# Patient Record
Sex: MALE | Race: OTHER | HISPANIC OR LATINO
[De-identification: names, ages, dates, MRNs, and addresses within clinical notes are randomized per-mention and may not be internally consistent; named-entity substitution may affect disease eponyms.]

---

## 2017-06-19 ENCOUNTER — TRANSCRIPTION ENCOUNTER (OUTPATIENT)
Age: 67
End: 2017-06-19

## 2017-11-08 ENCOUNTER — TRANSCRIPTION ENCOUNTER (OUTPATIENT)
Age: 67
End: 2017-11-08

## 2017-11-25 ENCOUNTER — TRANSCRIPTION ENCOUNTER (OUTPATIENT)
Age: 67
End: 2017-11-25

## 2018-01-14 ENCOUNTER — TRANSCRIPTION ENCOUNTER (OUTPATIENT)
Age: 68
End: 2018-01-14

## 2019-03-01 ENCOUNTER — TRANSCRIPTION ENCOUNTER (OUTPATIENT)
Age: 69
End: 2019-03-01

## 2019-03-26 ENCOUNTER — TRANSCRIPTION ENCOUNTER (OUTPATIENT)
Age: 69
End: 2019-03-26

## 2019-06-12 ENCOUNTER — TRANSCRIPTION ENCOUNTER (OUTPATIENT)
Age: 69
End: 2019-06-12

## 2019-09-02 ENCOUNTER — INPATIENT (INPATIENT)
Facility: HOSPITAL | Age: 69
LOS: 1 days | Discharge: HOME | DRG: 683 | End: 2019-09-04
Attending: FAMILY MEDICINE | Admitting: INTERNAL MEDICINE
Payer: MEDICARE

## 2019-09-02 VITALS
SYSTOLIC BLOOD PRESSURE: 99 MMHG | HEART RATE: 92 BPM | OXYGEN SATURATION: 100 % | TEMPERATURE: 98 F | HEIGHT: 60 IN | RESPIRATION RATE: 16 BRPM | WEIGHT: 121.92 LBS | DIASTOLIC BLOOD PRESSURE: 60 MMHG

## 2019-09-02 DIAGNOSIS — F05 DELIRIUM DUE TO KNOWN PHYSIOLOGICAL CONDITION: ICD-10-CM

## 2019-09-02 DIAGNOSIS — R41.82 ALTERED MENTAL STATUS, UNSPECIFIED: ICD-10-CM

## 2019-09-02 LAB
ALBUMIN SERPL ELPH-MCNC: 4.1 G/DL — SIGNIFICANT CHANGE UP (ref 3.3–5)
ALP SERPL-CCNC: 45 U/L — SIGNIFICANT CHANGE UP (ref 40–120)
ALT FLD-CCNC: 49 U/L — SIGNIFICANT CHANGE UP (ref 12–78)
ANION GAP SERPL CALC-SCNC: 13 MMOL/L — SIGNIFICANT CHANGE UP (ref 5–17)
APAP SERPL-MCNC: <2 UG/ML — LOW (ref 10–30)
APPEARANCE UR: CLEAR — SIGNIFICANT CHANGE UP
APPEARANCE UR: CLEAR — SIGNIFICANT CHANGE UP
APTT BLD: 29.1 SEC — SIGNIFICANT CHANGE UP (ref 27.5–36.3)
AST SERPL-CCNC: 148 U/L — HIGH (ref 15–37)
BASOPHILS # BLD AUTO: 0.05 K/UL — SIGNIFICANT CHANGE UP (ref 0–0.2)
BASOPHILS NFR BLD AUTO: 0.5 % — SIGNIFICANT CHANGE UP (ref 0–2)
BILIRUB SERPL-MCNC: 1 MG/DL — SIGNIFICANT CHANGE UP (ref 0.2–1.2)
BILIRUB UR-MCNC: NEGATIVE — SIGNIFICANT CHANGE UP
BILIRUB UR-MCNC: NEGATIVE — SIGNIFICANT CHANGE UP
BUN SERPL-MCNC: 39 MG/DL — HIGH (ref 7–23)
CALCIUM SERPL-MCNC: 8.9 MG/DL — SIGNIFICANT CHANGE UP (ref 8.5–10.1)
CHLORIDE SERPL-SCNC: 103 MMOL/L — SIGNIFICANT CHANGE UP (ref 96–108)
CO2 SERPL-SCNC: 28 MMOL/L — SIGNIFICANT CHANGE UP (ref 22–31)
COLOR SPEC: YELLOW — SIGNIFICANT CHANGE UP
COLOR SPEC: YELLOW — SIGNIFICANT CHANGE UP
CREAT SERPL-MCNC: 2 MG/DL — HIGH (ref 0.5–1.3)
DIFF PNL FLD: ABNORMAL
DIFF PNL FLD: ABNORMAL
EOSINOPHIL # BLD AUTO: 0.16 K/UL — SIGNIFICANT CHANGE UP (ref 0–0.5)
EOSINOPHIL NFR BLD AUTO: 1.7 % — SIGNIFICANT CHANGE UP (ref 0–6)
ETHANOL SERPL-MCNC: <10 MG/DL — SIGNIFICANT CHANGE UP (ref 0–10)
GLUCOSE SERPL-MCNC: 147 MG/DL — HIGH (ref 70–99)
GLUCOSE UR QL: NEGATIVE MG/DL — SIGNIFICANT CHANGE UP
GLUCOSE UR QL: NEGATIVE MG/DL — SIGNIFICANT CHANGE UP
HCT VFR BLD CALC: 38.6 % — LOW (ref 39–50)
HGB BLD-MCNC: 12.9 G/DL — LOW (ref 13–17)
IMM GRANULOCYTES NFR BLD AUTO: 0.3 % — SIGNIFICANT CHANGE UP (ref 0–1.5)
INR BLD: 1.24 RATIO — HIGH (ref 0.88–1.16)
KETONES UR-MCNC: ABNORMAL
KETONES UR-MCNC: ABNORMAL
LEUKOCYTE ESTERASE UR-ACNC: NEGATIVE — SIGNIFICANT CHANGE UP
LEUKOCYTE ESTERASE UR-ACNC: NEGATIVE — SIGNIFICANT CHANGE UP
LYMPHOCYTES # BLD AUTO: 1.11 K/UL — SIGNIFICANT CHANGE UP (ref 1–3.3)
LYMPHOCYTES # BLD AUTO: 11.8 % — LOW (ref 13–44)
MCHC RBC-ENTMCNC: 29.3 PG — SIGNIFICANT CHANGE UP (ref 27–34)
MCHC RBC-ENTMCNC: 33.4 GM/DL — SIGNIFICANT CHANGE UP (ref 32–36)
MCV RBC AUTO: 87.5 FL — SIGNIFICANT CHANGE UP (ref 80–100)
MONOCYTES # BLD AUTO: 0.93 K/UL — HIGH (ref 0–0.9)
MONOCYTES NFR BLD AUTO: 9.9 % — SIGNIFICANT CHANGE UP (ref 2–14)
NEUTROPHILS # BLD AUTO: 7.12 K/UL — SIGNIFICANT CHANGE UP (ref 1.8–7.4)
NEUTROPHILS NFR BLD AUTO: 75.8 % — SIGNIFICANT CHANGE UP (ref 43–77)
NITRITE UR-MCNC: NEGATIVE — SIGNIFICANT CHANGE UP
NITRITE UR-MCNC: NEGATIVE — SIGNIFICANT CHANGE UP
PCP SPEC-MCNC: SIGNIFICANT CHANGE UP
PH UR: 5 — SIGNIFICANT CHANGE UP (ref 5–8)
PH UR: 6 — SIGNIFICANT CHANGE UP (ref 5–8)
PLATELET # BLD AUTO: 244 K/UL — SIGNIFICANT CHANGE UP (ref 150–400)
POTASSIUM SERPL-MCNC: 3.6 MMOL/L — SIGNIFICANT CHANGE UP (ref 3.5–5.3)
POTASSIUM SERPL-SCNC: 3.6 MMOL/L — SIGNIFICANT CHANGE UP (ref 3.5–5.3)
PROT SERPL-MCNC: 7.3 GM/DL — SIGNIFICANT CHANGE UP (ref 6–8.3)
PROT UR-MCNC: 15 MG/DL
PROT UR-MCNC: NEGATIVE MG/DL — SIGNIFICANT CHANGE UP
PROTHROM AB SERPL-ACNC: 13.8 SEC — HIGH (ref 10–12.9)
RBC # BLD: 4.41 M/UL — SIGNIFICANT CHANGE UP (ref 4.2–5.8)
RBC # FLD: 13.3 % — SIGNIFICANT CHANGE UP (ref 10.3–14.5)
SALICYLATES SERPL-MCNC: 2.1 MG/DL — LOW (ref 2.8–20)
SODIUM SERPL-SCNC: 144 MMOL/L — SIGNIFICANT CHANGE UP (ref 135–145)
SP GR SPEC: 1.01 — SIGNIFICANT CHANGE UP (ref 1.01–1.02)
SP GR SPEC: 1.02 — SIGNIFICANT CHANGE UP (ref 1.01–1.02)
TSH SERPL-MCNC: 1.06 UU/ML — SIGNIFICANT CHANGE UP (ref 0.34–4.82)
UROBILINOGEN FLD QL: 1 MG/DL
UROBILINOGEN FLD QL: 1 MG/DL
WBC # BLD: 9.4 K/UL — SIGNIFICANT CHANGE UP (ref 3.8–10.5)
WBC # FLD AUTO: 9.4 K/UL — SIGNIFICANT CHANGE UP (ref 3.8–10.5)

## 2019-09-02 PROCEDURE — 82570 ASSAY OF URINE CREATININE: CPT

## 2019-09-02 PROCEDURE — 84300 ASSAY OF URINE SODIUM: CPT

## 2019-09-02 PROCEDURE — 82607 VITAMIN B-12: CPT

## 2019-09-02 PROCEDURE — 76700 US EXAM ABDOM COMPLETE: CPT

## 2019-09-02 PROCEDURE — 70450 CT HEAD/BRAIN W/O DYE: CPT | Mod: 26

## 2019-09-02 PROCEDURE — 86780 TREPONEMA PALLIDUM: CPT

## 2019-09-02 PROCEDURE — 80307 DRUG TEST PRSMV CHEM ANLYZR: CPT

## 2019-09-02 PROCEDURE — 82746 ASSAY OF FOLIC ACID SERUM: CPT

## 2019-09-02 PROCEDURE — 71045 X-RAY EXAM CHEST 1 VIEW: CPT | Mod: 26

## 2019-09-02 PROCEDURE — 36415 COLL VENOUS BLD VENIPUNCTURE: CPT

## 2019-09-02 PROCEDURE — 80076 HEPATIC FUNCTION PANEL: CPT

## 2019-09-02 PROCEDURE — 80048 BASIC METABOLIC PNL TOTAL CA: CPT

## 2019-09-02 PROCEDURE — 86803 HEPATITIS C AB TEST: CPT

## 2019-09-02 PROCEDURE — G0008: CPT

## 2019-09-02 PROCEDURE — 84443 ASSAY THYROID STIM HORMONE: CPT

## 2019-09-02 PROCEDURE — 87086 URINE CULTURE/COLONY COUNT: CPT

## 2019-09-02 PROCEDURE — 93005 ELECTROCARDIOGRAM TRACING: CPT

## 2019-09-02 PROCEDURE — 81001 URINALYSIS AUTO W/SCOPE: CPT

## 2019-09-02 PROCEDURE — 82140 ASSAY OF AMMONIA: CPT

## 2019-09-02 PROCEDURE — 93010 ELECTROCARDIOGRAM REPORT: CPT

## 2019-09-02 PROCEDURE — 80053 COMPREHEN METABOLIC PANEL: CPT

## 2019-09-02 PROCEDURE — 90686 IIV4 VACC NO PRSV 0.5 ML IM: CPT

## 2019-09-02 RX ORDER — DOCUSATE SODIUM 100 MG
100 CAPSULE ORAL THREE TIMES A DAY
Refills: 0 | Status: DISCONTINUED | OUTPATIENT
Start: 2019-09-02 | End: 2019-09-04

## 2019-09-02 RX ORDER — SODIUM CHLORIDE 9 MG/ML
1000 INJECTION INTRAMUSCULAR; INTRAVENOUS; SUBCUTANEOUS
Refills: 0 | Status: DISCONTINUED | OUTPATIENT
Start: 2019-09-02 | End: 2019-09-04

## 2019-09-02 RX ORDER — LACTULOSE 10 G/15ML
10 SOLUTION ORAL ONCE
Refills: 0 | Status: COMPLETED | OUTPATIENT
Start: 2019-09-02 | End: 2019-09-02

## 2019-09-02 RX ORDER — QUETIAPINE FUMARATE 200 MG/1
25 TABLET, FILM COATED ORAL AT BEDTIME
Refills: 0 | Status: DISCONTINUED | OUTPATIENT
Start: 2019-09-02 | End: 2019-09-04

## 2019-09-02 RX ORDER — SENNA PLUS 8.6 MG/1
2 TABLET ORAL AT BEDTIME
Refills: 0 | Status: DISCONTINUED | OUTPATIENT
Start: 2019-09-02 | End: 2019-09-04

## 2019-09-02 RX ORDER — ONDANSETRON 8 MG/1
4 TABLET, FILM COATED ORAL EVERY 6 HOURS
Refills: 0 | Status: DISCONTINUED | OUTPATIENT
Start: 2019-09-02 | End: 2019-09-04

## 2019-09-02 RX ORDER — SODIUM CHLORIDE 9 MG/ML
500 INJECTION INTRAMUSCULAR; INTRAVENOUS; SUBCUTANEOUS ONCE
Refills: 0 | Status: COMPLETED | OUTPATIENT
Start: 2019-09-02 | End: 2019-09-02

## 2019-09-02 RX ORDER — INFLUENZA VIRUS VACCINE 15; 15; 15; 15 UG/.5ML; UG/.5ML; UG/.5ML; UG/.5ML
0.5 SUSPENSION INTRAMUSCULAR ONCE
Refills: 0 | Status: COMPLETED | OUTPATIENT
Start: 2019-09-02 | End: 2019-09-04

## 2019-09-02 RX ORDER — ACETAMINOPHEN 500 MG
650 TABLET ORAL EVERY 6 HOURS
Refills: 0 | Status: DISCONTINUED | OUTPATIENT
Start: 2019-09-02 | End: 2019-09-04

## 2019-09-02 RX ADMIN — SODIUM CHLORIDE 500 MILLILITER(S): 9 INJECTION INTRAMUSCULAR; INTRAVENOUS; SUBCUTANEOUS at 14:04

## 2019-09-02 RX ADMIN — LACTULOSE 10 GRAM(S): 10 SOLUTION ORAL at 21:04

## 2019-09-02 RX ADMIN — Medication 100 MILLIGRAM(S): at 21:05

## 2019-09-02 RX ADMIN — QUETIAPINE FUMARATE 25 MILLIGRAM(S): 200 TABLET, FILM COATED ORAL at 21:05

## 2019-09-02 RX ADMIN — SODIUM CHLORIDE 1000 MILLILITER(S): 9 INJECTION INTRAMUSCULAR; INTRAVENOUS; SUBCUTANEOUS at 13:00

## 2019-09-02 NOTE — ED BEHAVIORAL HEALTH ASSESSMENT NOTE - HPI (INCLUDE ILLNESS QUALITY, SEVERITY, DURATION, TIMING, CONTEXT, MODIFYING FACTORS, ASSOCIATED SIGNS AND SYMPTOMS)
69 year-old male, with history of one admission ~1985 for AMS, with no psychiatric treatment, presents brought in by for AMS x 72 hours.    Patient presents alert and oriented to self only, illogical, impaired reasoning, disorganized, looseness of association, poor retention and recall, poor concentration, rambling. Patient is not agitated or aggressive. When asked date, reports it being "1900's and 2020." When asked about place, reports "I am the missing link; I am retired." [place] Denies suicidal ideation. Denies homicidal ideation. Denies paranoia. Patient appears to have good impulse control in ED.     Son reports patient lives alone with ingrid, who (last mentioned) saw patient started showing alteration in mentation ~72 hours; not caring for self as usual; not eating; walking around aimlessly. Reports otherwise at baseline patient is polite, calm, cares for self, independent, however at this time is severely confused. Reports concern for safety especially given wandering behaviors.

## 2019-09-02 NOTE — ED BEHAVIORAL HEALTH ASSESSMENT NOTE - NS ED BHA REVIEW OF ED CHART VITAL SIGNS REVIEWED
Called with help of language line  ID #[id number].   No answer, message left with results information by     ~ Vinny SARAVIA CMA ([place])  
Yes

## 2019-09-02 NOTE — ED PROVIDER NOTE - CARE PLAN
Principal Discharge DX:	Altered mental status, unspecified altered mental status type Principal Discharge DX:	Altered mental status, unspecified altered mental status type  Secondary Diagnosis:	Renal insufficiency

## 2019-09-02 NOTE — ED ADULT NURSE NOTE - OBJECTIVE STATEMENT
BIB son with c/o "bizarre behavior", pt lives alone and landlord called son concerned about pt. Pt denies SI/HI

## 2019-09-02 NOTE — H&P ADULT - ASSESSMENT
70 yo M with pmh "thyroid disorder"  h/o remote PUD, h/o ETOH abuse > 10 years sober, h/o polysubstance abuse sober also sober > 10 years per son, unemployed h/o unknown mental illness and hospitalized in  and treated with haldol and other medications (stopped haldol due to tardive dyskinesia) now off all medications for > 30 years presenting with 3 days of insomnia, altered mentation, pressured speech and hallucinations. Per son Morgan Roche, states his father has been wandering on [place] at very late hours of the night stating that he is with his mother and father both who are . Son also endorses that he dad is speaking incoherently, very pressured, not making sense and verbose which is not typical behavior for him. His father has been unemployed since the  when he was found to have a " mental illness," however he is not sure what the diagnosis was and he is does not follow up with a therapist. No new stressors. No recent illness.       A:  Acute psychosis r/o organic causes vs primary manic episode  Acute renal failure unknown baseline)  Transaminitis with mild elevation in ammonia level      P:  Admit to [facility]  CTH negative  Cehck RPR, TSH, B12, folate  Utox negative, ETOH negative  U/S of RUQ. lactulose. hep panel  U/S kidney.bladder, urine studies  Psych follow up if all studies do not point towards metabolic or infectious etiology    IMPROVE VTE Individual Risk Assessment    RISK                                                                Points    [  ] Previous VTE                                                  3    [  ] Thrombophilia                                               2    [  ] Lower limb paralysis                                      2        (unable to hold up >15 seconds)      [  ] Current Cancer                                              2         (within 6 months)    [  ] Immobilization > 24 hrs                                1    [  ] ICU/CCU stay > 24 hours                              1    [x  ] Age > 60                                                      1    IMPROVE VTE Score ___1______-> SCD/ambualate    D/W son Morgan [phone]  Full code    IMPROVE Score 0-1: Low Risk, No VTE prophylaxis required for most patients, encourage ambulation.   IMPROVE Score 2-3: At risk, pharmacologic VTE prophylaxis is indicated for most patients (in the absence of a contraindication)  IMPROVE Score > or = 4: High Risk, pharmacologic VTE prophylaxis is indicated for most patients (in the absence of a contraindication)

## 2019-09-02 NOTE — ED PROVIDER NOTE - OBJECTIVE STATEMENT
68 y/o male with no pertinent PMHx presents to the ED BIB son regarding erratic behavior/ Per son at bedside pt has not slept for the past 3 days and has been talking to hiimself and expressing erratic behavior. Pt was admitted to hospital once before in 1984 for erratic behavior and was previously on medications that he hasn't taken for 35 years. Pt's son denies pt being suicidal or homicidal. Son- Morgan Maurernte: [phone].

## 2019-09-02 NOTE — ED ADULT TRIAGE NOTE - CHIEF COMPLAINT QUOTE
pt brought by EMS home brought by son for eval of irregular behavior increasing over past 3 days. pt denies crawford, ETOH, drug use. pt noted to be restless in triage.

## 2019-09-02 NOTE — ED BEHAVIORAL HEALTH ASSESSMENT NOTE - SUMMARY
69 year-old male, with history of one admission ~1985 for AMS, with no psychiatric treatment, presents brought in by for AMS x 72 hours.    Patient presents with acute change in mentation, indicative of delirium with unknown mentation. It is unlikely patient is having psychotic episode given stability for > 35 years with no medication management. Patient has no prior / current suicidal ideation/intent/plan. Patient has family support. Patient being admitted to medicine and psychiatry to follow: medical treatment (renal insufficiency) and sleep is expected to improve symptoms.    PLAN:  #Seroquel 25 mg at bedtime.

## 2019-09-02 NOTE — ED BEHAVIORAL HEALTH ASSESSMENT NOTE - PSYCHIATRIC ISSUES AND PLAN (INCLUDE STANDING AND PRN MEDICATION)
Seroquel 25 mg at bedtime: plan to titrate to ~ 50 - 100 mg over time. Haldol 1 mg Q6H as needed for agitation

## 2019-09-02 NOTE — ED BEHAVIORAL HEALTH ASSESSMENT NOTE - DESCRIPTION
Patient was calm and cooperative in the ED and did not exhibit any aggression. Patient did not require any PRN medications or any physical restraints.    Vital Signs Last 24 Hrs  T(C): 36.6 (02 Sep 2019 12:56), Max: 36.8 (02 Sep 2019 12:14)  T(F): 97.9 (02 Sep 2019 12:56), Max: 98.3 (02 Sep 2019 12:14)  HR: 76 (02 Sep 2019 12:56) (75 - 92)  BP: 143/76 (02 Sep 2019 12:56) (99/60 - 143/76)  BP(mean): --  RR: 16 (02 Sep 2019 12:56) (16 - 17)  SpO2: 100% (02 Sep 2019 12:56) (100% - 100%) None. As per HPI

## 2019-09-02 NOTE — ED ADULT NURSE NOTE - NSIMPLEMENTINTERV_GEN_ALL_ED
Implemented All Universal Safety Interventions:  [place] to call system. Call bell, personal items and telephone within reach. Instruct patient to call for assistance. Room bathroom lighting operational. Non-slip footwear when patient is off stretcher. Physically safe environment: no spills, clutter or unnecessary equipment. Stretcher in lowest position, wheels locked, appropriate side rails in place.

## 2019-09-02 NOTE — ED BEHAVIORAL HEALTH ASSESSMENT NOTE - RISK ASSESSMENT
LOW TO MODERATE RISK    ACUTE RISK FACTORS: AMS, illogical, disorganized    PROTECTIVE FACTORS: no prior / current suicidal ideation/intent/plan or suicide attempt; no depressed mood; supportive family; access to healthcare.

## 2019-09-02 NOTE — ED PROVIDER NOTE - CONSTITUTIONAL, MLM
normal... Well appearing, well nourished, alert, oriented to person, place, time/situation and in no apparent distress. +pt sleepy

## 2019-09-02 NOTE — ED PROVIDER NOTE - PROGRESS NOTE DETAILS
Attending Echevarria, Psych eval pt and believes more delirium then psychosis. Attending festus Echevarria/darwin Kendall for admission

## 2019-09-02 NOTE — H&P ADULT - NSHPPHYSICALEXAM_GEN_ALL_CORE
ICU Vital Signs Last 24 Hrs  T(C): 36.6 (02 Sep 2019 12:56), Max: 36.8 (02 Sep 2019 12:14)  T(F): 97.9 (02 Sep 2019 12:56), Max: 98.3 (02 Sep 2019 12:14)  HR: 66 (02 Sep 2019 15:30) (66 - 92)  BP: 143/76 (02 Sep 2019 15:30) (99/60 - 143/76)  BP(mean): --  ABP: --  ABP(mean): --  RR: 16 (02 Sep 2019 15:30) (16 - 17)  SpO2: 100% (02 Sep 2019 15:30) (100% - 100%)      PHYSICAL EXAM:    Constitutional: NAD, awake and alert  HEENT: PERR, EOMI, Normal Hearing, MMM  Neck: Soft and supple, No LAD, No JVD  Respiratory: Breath sounds are clear bilaterally, No wheezing, rales or rhonchi  Cardiovascular: S1 and S2, regular rate and rhythm, no Murmurs, gallops or rubs  Gastrointestinal: Bowel Sounds present, soft, nontender, nondistended, no guarding, no rebound  Extremities: No peripheral edema  Vascular: 2+ peripheral pulses  Neurological: A/O x 0, no focal deficits  Musculoskeletal: 5/5 strength b/l upper and lower extremities  Skin: No rashes ICU Vital Signs Last 24 Hrs  T(C): 36.6 (02 Sep 2019 12:56), Max: 36.8 (02 Sep 2019 12:14)  T(F): 97.9 (02 Sep 2019 12:56), Max: 98.3 (02 Sep 2019 12:14)  HR: 66 (02 Sep 2019 15:30) (66 - 92)  BP: 143/76 (02 Sep 2019 15:30) (99/60 - 143/76)  BP(mean): --  ABP: --  ABP(mean): --  RR: 16 (02 Sep 2019 15:30) (16 - 17)  SpO2: 100% (02 Sep 2019 15:30) (100% - 100%)      PHYSICAL EXAM:    Constitutional: NAD, awake and alert  HEENT: PERR, EOMI, Normal Hearing, MMM  Neck: Soft and supple, No LAD, No JVD  Respiratory: Breath sounds are clear bilaterally, No wheezing, rales or rhonchi  Cardiovascular: S1 and S2, regular rate and rhythm, no Murmurs, gallops or rubs  Gastrointestinal: Bowel Sounds present, soft, nontender, nondistended, no guarding, no rebound  Extremities: No peripheral edema  Vascular: 2+ peripheral pulses  Neurological: A/O x 0, no focal deficits; no nuchal rigidity  Musculoskeletal: 5/5 strength b/l upper and lower extremities  Skin: No rashes

## 2019-09-02 NOTE — H&P ADULT - HISTORY OF PRESENT ILLNESS
70 yo M with pmh "thyroid disorder"  h/o remote PUD, h/o ETOH abuse > 10 years sober, h/o polysubstance abuse sober also sober > 10 years per son, unemployed h/o unknown mental illness and hospitalized in  and treated with haldol and other medications (stopped haldol due to tardive dyskinesia) now off all medications for > 30 years presenting with 3 days of insomnia, altered mentation, pressured speech and hallucinations. Per son Morgan Roche, states his father has been wandering on [place] at very late hours of the night stating that he is with his mother and father both who are . Son also endorses that he dad is speaking incoherently, very pressured, not making sense and verbose which is not typical behavior for him. His father has been unemployed since the  when he was found to have a " mental illness," however he is not sure what the diagnosis was and he is does not follow up with a therapist. No new stressors. No recent illness.     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 Has also has seen Vicky De La Torre but family states has not seen her since her primary office closed    Son provided most of history      Social: Vapes, former etoh and crack abuser  Shx: spine surgery, PUD - laparotomy  Fhx: son states no family h/o bipolar disorder or schizophrenia in uncles/aunts however he is unsure about paternal grandparents 68 yo M with pmh "thyroid disorder"  h/o remote PUD, h/o ETOH abuse > 10 years sober, h/o polysubstance abuse sober also sober > 10 years per son, unemployed h/o unknown mental illness and hospitalized in  and treated with haldol and other medications (stopped haldol due to tardive dyskinesia) now off all medications for > 30 years presenting with 3 days of insomnia, altered mentation, pressured speech and hallucinations. Per son Morgan Roche, states his father has been wandering on [place] at very late hours of the night stating that he is with his mother and father both who are . Son also endorses that he dad is speaking incoherently, very pressured, not making sense and verbose which is not typical behavior for him. His father has been unemployed since the  when he was found to have a " mental illness," however he is not sure what the diagnosis was and he is does not follow up with a therapist. No new stressors. No recent illness. 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 Has also has seen Vicky De La Torre but family states has not seen her since her primary office closed    Son provided most of history      Social: Vapes, former etoh and crack abuser  Shx: spine surgery, PUD - laparotomy  Fhx: son states no family h/o bipolar disorder or schizophrenia in uncles/aunts however he is unsure about paternal grandparents

## 2019-09-02 NOTE — ED PROVIDER NOTE - CLINICAL SUMMARY MEDICAL DECISION MAKING FREE TEXT BOX
Patient called on status of prescription.      PSR relayed refill policy timeframe.     Pt with erratic behavior. Plan: labs, CT, probably psych consult.

## 2019-09-02 NOTE — H&P ADULT - NSHPLABSRESULTS_GEN_ALL_CORE
LABS: All Labs Reviewed:                        12.9   9.40  )-----------( 244      ( 02 Sep 2019 12:52 )             38.6     09-02    144  |  103  |  39<H>  ----------------------------<  147<H>  3.6   |  28  |  2.00<H>    Ca    8.9      02 Sep 2019 12:52    TPro  7.3  /  Alb  4.1  /  TBili  1.0  /  DBili  x   /  AST  148<H>  /  ALT  49  /  AlkPhos  45  09-02    PT/INR - ( 02 Sep 2019 12:52 )   PT: 13.8 sec;   INR: 1.24 ratio         PTT - ( 02 Sep 2019 12:52 )  PTT:29.1 sec          Blood Culture:

## 2019-09-03 LAB
ADD ON TEST-SPECIMEN IN LAB: SIGNIFICANT CHANGE UP
ALBUMIN SERPL ELPH-MCNC: 3.3 G/DL — SIGNIFICANT CHANGE UP (ref 3.3–5)
ALP SERPL-CCNC: 38 U/L — LOW (ref 40–120)
ALT FLD-CCNC: 44 U/L — SIGNIFICANT CHANGE UP (ref 12–78)
ANION GAP SERPL CALC-SCNC: 9 MMOL/L — SIGNIFICANT CHANGE UP (ref 5–17)
AST SERPL-CCNC: 109 U/L — HIGH (ref 15–37)
BILIRUB DIRECT SERPL-MCNC: 0.2 MG/DL — SIGNIFICANT CHANGE UP (ref 0–0.2)
BILIRUB INDIRECT FLD-MCNC: 0.5 MG/DL — SIGNIFICANT CHANGE UP (ref 0.2–1)
BILIRUB SERPL-MCNC: 0.7 MG/DL — SIGNIFICANT CHANGE UP (ref 0.2–1.2)
BUN SERPL-MCNC: 24 MG/DL — HIGH (ref 7–23)
CALCIUM SERPL-MCNC: 8.3 MG/DL — LOW (ref 8.5–10.1)
CHLORIDE SERPL-SCNC: 108 MMOL/L — SIGNIFICANT CHANGE UP (ref 96–108)
CO2 SERPL-SCNC: 30 MMOL/L — SIGNIFICANT CHANGE UP (ref 22–31)
CREAT ?TM UR-MCNC: 152 MG/DL — SIGNIFICANT CHANGE UP
CREAT SERPL-MCNC: 1.12 MG/DL — SIGNIFICANT CHANGE UP (ref 0.5–1.3)
CULTURE RESULTS: SIGNIFICANT CHANGE UP
FOLATE SERPL-MCNC: 6.1 NG/ML — SIGNIFICANT CHANGE UP
GLUCOSE SERPL-MCNC: 90 MG/DL — SIGNIFICANT CHANGE UP (ref 70–99)
HCV AB S/CO SERPL IA: 0.37 S/CO — SIGNIFICANT CHANGE UP (ref 0–0.99)
HCV AB SERPL-IMP: SIGNIFICANT CHANGE UP
POTASSIUM SERPL-MCNC: 3.9 MMOL/L — SIGNIFICANT CHANGE UP (ref 3.5–5.3)
POTASSIUM SERPL-SCNC: 3.9 MMOL/L — SIGNIFICANT CHANGE UP (ref 3.5–5.3)
PROT SERPL-MCNC: 6.1 GM/DL — SIGNIFICANT CHANGE UP (ref 6–8.3)
SODIUM SERPL-SCNC: 147 MMOL/L — HIGH (ref 135–145)
SODIUM UR-SCNC: 65 MMOL/L — SIGNIFICANT CHANGE UP
SPECIMEN SOURCE: SIGNIFICANT CHANGE UP
T PALLIDUM AB TITR SER: NEGATIVE — SIGNIFICANT CHANGE UP
VIT B12 SERPL-MCNC: 490 PG/ML — SIGNIFICANT CHANGE UP (ref 232–1245)

## 2019-09-03 PROCEDURE — 99232 SBSQ HOSP IP/OBS MODERATE 35: CPT

## 2019-09-03 PROCEDURE — 76700 US EXAM ABDOM COMPLETE: CPT | Mod: 26

## 2019-09-03 PROCEDURE — 93010 ELECTROCARDIOGRAM REPORT: CPT

## 2019-09-03 RX ORDER — ENOXAPARIN SODIUM 100 MG/ML
40 INJECTION SUBCUTANEOUS DAILY
Refills: 0 | Status: DISCONTINUED | OUTPATIENT
Start: 2019-09-03 | End: 2019-09-04

## 2019-09-03 RX ADMIN — Medication 650 MILLIGRAM(S): at 09:33

## 2019-09-03 RX ADMIN — QUETIAPINE FUMARATE 25 MILLIGRAM(S): 200 TABLET, FILM COATED ORAL at 22:36

## 2019-09-03 RX ADMIN — Medication 100 MILLIGRAM(S): at 05:14

## 2019-09-03 RX ADMIN — ENOXAPARIN SODIUM 40 MILLIGRAM(S): 100 INJECTION SUBCUTANEOUS at 11:56

## 2019-09-03 RX ADMIN — Medication 650 MILLIGRAM(S): at 10:30

## 2019-09-03 NOTE — PROGRESS NOTE ADULT - SUBJECTIVE AND OBJECTIVE BOX
HPI: 70 yo M with pmh "thyroid disorder"  h/o remote PUD, h/o ETOH abuse > 10 years sober, h/o polysubstance abuse sober also sober > 10 years per son, unemployed h/o unknown mental illness and hospitalized in  and treated with haldol and other medications (stopped haldol due to tardive dyskinesia) now off all medications for > 30 years presenting with 3 days of insomnia, altered mentation, pressured speech and hallucinations. Per son Morgan Roche, states his father has been wandering on [place] at very late hours of the night stating that he is with his mother and father both who are . Son also endorses that he dad is speaking incoherently, very pressured, not making sense and verbose which is not typical behavior for him. His father has been unemployed since the  when he was found to have a " mental illness," however he is not sure what the diagnosis was and he is does not follow up with a therapist. No new stressors. No recent illness. 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 Has also has seen Vicky De La Torre but [facility] has not seen her since her primary office closed      9/3: no complaints  awake , alert  NH3 28  Cr normalized to 1.12  LFTs elevated      PHYSICAL EXAM:    Daily     Daily     ICU Vital Signs Last 24 Hrs  T(C): 36.7 (03 Sep 2019 10:58), Max: 36.9 (03 Sep 2019 05:16)  T(F): 98 (03 Sep 2019 10:58), Max: 98.4 (03 Sep 2019 05:16)  HR: 68 (03 Sep 2019 10:58) (55 - 86)  BP: 112/60 (03 Sep 2019 10:58) (112/60 - 139/63)  BP(mean): --  ABP: --  ABP(mean): --  RR: 17 (03 Sep 2019 10:58) (16 - 17)  SpO2: 100% (03 Sep 2019 10:58) (98% - 100%)      Constitutional: Well appearing  HEENT: Atraumatic, LEORA, Normal, No congestion  Respiratory: Breath Sounds normal, no rhonchi/wheeze  Cardiovascular: N S1S2;  Gastrointestinal: Abdomen soft, non tender, Bowel Sounds present  Extremities: No edema, peripheral pulses present  Neurological: AAO x 2,, no gross focal motor deficits  Skin: Non cellulitic, no rash, ulcers  Lymph Nodes: No lymphadenopathy noted  Back: No CVA tenderness   Musculoskeletal: non tender  Breasts: Deferred  Genitourinary: deferred  Rectal: Deferred                          12.9   9.40  )-----------( 244      ( 02 Sep 2019 12:52 )             38.6       CBC Full  -  ( 02 Sep 2019 12:52 )  WBC Count : 9.40 K/uL  RBC Count : 4.41 M/uL  Hemoglobin : 12.9 g/dL  Hematocrit : 38.6 %  Platelet Count - Automated : 244 K/uL  Mean Cell Volume : 87.5 fl  Mean Cell Hemoglobin : 29.3 pg  Mean Cell Hemoglobin Concentration : 33.4 gm/dL  Auto Neutrophil # : 7.12 K/uL  Auto Lymphocyte # : 1.11 K/uL  Auto Monocyte # : 0.93 K/uL  Auto Eosinophil # : 0.16 K/uL  Auto Basophil # : 0.05 K/uL  Auto Neutrophil % : 75.8 %  Auto Lymphocyte % : 11.8 %  Auto Monocyte % : 9.9 %  Auto Eosinophil % : 1.7 %  Auto Basophil % : 0.5 %          147<H>  |  108  |  24<H>  ----------------------------<  90  3.9   |  30  |  1.12    Ca    8.3<L>      03 Sep 2019 06:38    TPro  6.1  /  Alb  3.3  /  TBili  0.7  /  DBili  0.2  /  AST  109<H>  /  ALT  44  /  AlkPhos  38<L>        LIVER FUNCTIONS - ( 03 Sep 2019 06:38 )  Alb: 3.3 g/dL / Pro: 6.1 gm/dL / ALK PHOS: 38 U/L / ALT: 44 U/L / AST: 109 U/L / GGT: x             PT/INR - ( 02 Sep 2019 12:52 )   PT: 13.8 sec;   INR: 1.24 ratio         PTT - ( 02 Sep 2019 12:52 )  PTT:29.1 sec          Urinalysis Basic - ( 02 Sep 2019 21:00 )    Color: Yellow / Appearance: Clear / S.015 / pH: x  Gluc: x / Ketone: Trace  / Bili: Negative / Urobili: 1 mg/dL   Blood: x / Protein: Negative mg/dL / Nitrite: Negative   Leuk Esterase: Negative / RBC: 3-5 /HPF / WBC 3-5   Sq Epi: x / Non Sq Epi: Occasional / Bacteria: Occasional            MEDICATIONS  (STANDING):  docusate sodium 100 milliGRAM(s) Oral three times a day  enoxaparin Injectable 40 milliGRAM(s) SubCutaneous daily  influenza   Vaccine 0.5 milliLiter(s) IntraMuscular once  QUEtiapine 25 milliGRAM(s) Oral at bedtime  sodium chloride 0.9%. 1000 milliLiter(s) (50 mL/Hr) IV Continuous <Continuous>

## 2019-09-03 NOTE — PROGRESS NOTE ADULT - ASSESSMENT
70 yo M with pmh "thyroid disorder"  h/o remote PUD, h/o ETOH abuse > 10 years sober, h/o polysubstance abuse sober also sober > 10 years per son, unemployed h/o unknown mental illness and hospitalized in 1984 and treated with haldol and other medications (stopped haldol due to tardive dyskinesia) now off all medications for > 30 years presenting with 3 days of insomnia, altered mentation, pressured speech and hallucinations.    Pt admitted with     1) Acute psychosis r/o organic causes vs primary manic episode  better now,   ammonia normalized  ALYSSA normalized  US abdo shows cirrhosis  Psych f/u appreciated; pt would need Psych clearance prior to discharge    2) Acute renal failure: resolved with iv fluids  recheck in am    3) Transaminitis with mild elevation in ammonia level:   trending down; likely form dehydration + cirrhosis  check in am    4) DVT PPX: enoxaparin 68 yo M with pmh "thyroid disorder"  h/o remote PUD, h/o ETOH abuse > 10 years sober, h/o polysubstance abuse sober also sober > 10 years per son, unemployed h/o unknown mental illness and hospitalized in 1984 and treated with haldol and other medications (stopped haldol due to tardive dyskinesia) now off all medications for > 30 years presenting with 3 days of insomnia, altered mentation, pressured speech and hallucinations.    Pt admitted with     1) Acute psychosis r/o organic causes vs primary manic episode  better now,   ammonia normalized  ALYSSA normalized    Psych f/u appreciated; pt would need Psych clearance prior to discharge    2) Acute renal failure: resolved with iv fluids  recheck in am    3) Transaminitis with mild elevation in ammonia level:   trending down; likely form dehydration + cirrhosis  US abdo shows cirrhosis + ? hemangioma of liver  check in am    4) DVT PPX: enoxaparin

## 2019-09-04 ENCOUNTER — TRANSCRIPTION ENCOUNTER (OUTPATIENT)
Age: 69
End: 2019-09-04

## 2019-09-04 VITALS
RESPIRATION RATE: 16 BRPM | HEART RATE: 56 BPM | SYSTOLIC BLOOD PRESSURE: 103 MMHG | DIASTOLIC BLOOD PRESSURE: 61 MMHG | TEMPERATURE: 98 F | OXYGEN SATURATION: 100 %

## 2019-09-04 LAB
ALBUMIN SERPL ELPH-MCNC: 3.1 G/DL — LOW (ref 3.3–5)
ALP SERPL-CCNC: 36 U/L — LOW (ref 40–120)
ALT FLD-CCNC: 40 U/L — SIGNIFICANT CHANGE UP (ref 12–78)
ANION GAP SERPL CALC-SCNC: 4 MMOL/L — LOW (ref 5–17)
AST SERPL-CCNC: 68 U/L — HIGH (ref 15–37)
BILIRUB SERPL-MCNC: 0.6 MG/DL — SIGNIFICANT CHANGE UP (ref 0.2–1.2)
BUN SERPL-MCNC: 15 MG/DL — SIGNIFICANT CHANGE UP (ref 7–23)
CALCIUM SERPL-MCNC: 8.1 MG/DL — LOW (ref 8.5–10.1)
CHLORIDE SERPL-SCNC: 111 MMOL/L — HIGH (ref 96–108)
CO2 SERPL-SCNC: 30 MMOL/L — SIGNIFICANT CHANGE UP (ref 22–31)
CREAT SERPL-MCNC: 1.01 MG/DL — SIGNIFICANT CHANGE UP (ref 0.5–1.3)
GLUCOSE SERPL-MCNC: 87 MG/DL — SIGNIFICANT CHANGE UP (ref 70–99)
POTASSIUM SERPL-MCNC: 3.7 MMOL/L — SIGNIFICANT CHANGE UP (ref 3.5–5.3)
POTASSIUM SERPL-SCNC: 3.7 MMOL/L — SIGNIFICANT CHANGE UP (ref 3.5–5.3)
PROT SERPL-MCNC: 5.5 GM/DL — LOW (ref 6–8.3)
SODIUM SERPL-SCNC: 145 MMOL/L — SIGNIFICANT CHANGE UP (ref 135–145)

## 2019-09-04 PROCEDURE — 99232 SBSQ HOSP IP/OBS MODERATE 35: CPT

## 2019-09-04 RX ORDER — ACETAMINOPHEN 500 MG
2 TABLET ORAL
Qty: 0 | Refills: 0 | DISCHARGE
Start: 2019-09-04

## 2019-09-04 RX ORDER — DOCUSATE SODIUM 100 MG
1 CAPSULE ORAL
Qty: 0 | Refills: 0 | DISCHARGE
Start: 2019-09-04

## 2019-09-04 RX ADMIN — INFLUENZA VIRUS VACCINE 0.5 MILLILITER(S): 15; 15; 15; 15 SUSPENSION INTRAMUSCULAR at 12:23

## 2019-09-04 RX ADMIN — ENOXAPARIN SODIUM 40 MILLIGRAM(S): 100 INJECTION SUBCUTANEOUS at 11:13

## 2019-09-04 RX ADMIN — SODIUM CHLORIDE 50 MILLILITER(S): 9 INJECTION INTRAMUSCULAR; INTRAVENOUS; SUBCUTANEOUS at 00:16

## 2019-09-04 NOTE — PROGRESS NOTE BEHAVIORAL HEALTH - NSBHCHARTREVIEWLAB_PSY_A_CORE FT
12.9   9.40  )-----------( 244      ( 02 Sep 2019 12:52 )             38.6   09-03    147<H>  |  108  |  24<H>  ----------------------------<  90  3.9   |  30  |  1.12    Ca    8.3<L>      03 Sep 2019 06:38    TPro  6.1  /  Alb  3.3  /  TBili  0.7  /  DBili  0.2  /  AST  109<H>  /  ALT  44  /  AlkPhos  38<L>  09-03
12.9   9.40  )-----------( 244      ( 02 Sep 2019 12:52 )             38.6   09-04    145  |  111<H>  |  15  ----------------------------<  87  3.7   |  30  |  1.01    Ca    8.1<L>      04 Sep 2019 06:21    TPro  5.5<L>  /  Alb  3.1<L>  /  TBili  0.6  /  DBili  x   /  AST  68<H>  /  ALT  40  /  AlkPhos  36<L>  09-04

## 2019-09-04 NOTE — DISCHARGE NOTE PROVIDER - NSDCCPCAREPLAN_GEN_ALL_CORE_FT
PRINCIPAL DISCHARGE DIAGNOSIS  Diagnosis: Altered mental status, unspecified altered mental status type  Assessment and Plan of Treatment: -Resolved  -Follow up with PCP within 1 week of discharge      SECONDARY DISCHARGE DIAGNOSES  Diagnosis: Liver hemangioma  Assessment and Plan of Treatment: -Incidental finding on ultrasound  -Follow up with GI within 1 week of discharge    Diagnosis: Renal insufficiency  Assessment and Plan of Treatment: -Resolved  -Encourage oral fluid hydration

## 2019-09-04 NOTE — DISCHARGE NOTE PROVIDER - CARE PROVIDER_API CALL
Tyshawn Zaldivar)  Gastroenterology; Internal Medicine  [address], [address]  [place] [address]  Phone: [phone]  Fax: [phone]  Follow Up Time: 1 week

## 2019-09-04 NOTE — PROGRESS NOTE BEHAVIORAL HEALTH - SUMMARY
69 year-old male, with history of one admission for AMS, with no psychiatric treatment, presents brought in by for AMS x 72 hours.    Patient was admitted  with acute change in mental status, indicative of delirium.  Patient has no prior / current suicidal ideation/intent/plan. Patient has family support.   Pt si not at imminent risk to harm self and other and does not need inpatient psychiatry treatment.     PLAN:    D/C Seroquel 25 mg at bedtime . There is no behavioral problems and pt is not psychotic.
69 year-old male, with history of one admission ~1985 for AMS, with no psychiatric treatment, presents brought in by for AMS x 72 hours.    Patient was admitted  with acute change in mental status, indicative of delirium.  Patient has no prior / current suicidal ideation/intent/plan. Patient has family support.   Pt si not at imminent risk to harm self and other and does not need inpatient psychiatry treatment.     PLAN:  #Seroquel 25 mg at bedtime.

## 2019-09-04 NOTE — PROGRESS NOTE BEHAVIORAL HEALTH - NSBHCHARTREVIEWVS_PSY_A_CORE FT
Vital Signs Last 24 Hrs  T(C): 36.7 (03 Sep 2019 10:58), Max: 36.9 (03 Sep 2019 05:16)  T(F): 98 (03 Sep 2019 10:58), Max: 98.4 (03 Sep 2019 05:16)  HR: 68 (03 Sep 2019 10:58) (55 - 86)  BP: 112/60 (03 Sep 2019 10:58) (112/60 - 143/76)  BP(mean): --  RR: 17 (03 Sep 2019 10:58) (16 - 17)  SpO2: 100% (03 Sep 2019 10:58) (98% - 100%)
Vital Signs Last 24 Hrs  T(C): 36.9 (04 Sep 2019 11:14), Max: 37.1 (03 Sep 2019 20:07)  T(F): 98.4 (04 Sep 2019 11:14), Max: 98.7 (03 Sep 2019 20:07)  HR: 56 (04 Sep 2019 11:14) (52 - 57)  BP: 103/61 (04 Sep 2019 11:14) (96/57 - 114/67)  BP(mean): --  RR: 16 (04 Sep 2019 11:14) (16 - 18)  SpO2: 100% (04 Sep 2019 11:14) (99% - 100%)

## 2019-09-04 NOTE — PROGRESS NOTE BEHAVIORAL HEALTH - NSBHCHARTREVIEWIMAGING_PSY_A_CORE FT
CT head:  IMPRESSION:   Mild periventricular white matter ischemia.
CT head:  IMPRESSION:   Mild periventricular white matter ischemia.

## 2019-09-04 NOTE — PROGRESS NOTE BEHAVIORAL HEALTH - NSBHFUPINTERVALHXFT_PSY_A_CORE
Pt is dressed in [facility], [facility]. He denies feeling depressed or anxious. Denies use of substances, denies any psych hx. Not taking psychotropics. he resides alone, but has children and ex wife in community.   He is retired.   He denies any type of SI, HI, Ah, VH, Pi at this time.  Sleep and appetite are satisfying.
Pt is spontaneous, talkative, communicative, calm interfraction. He denies feeling depressed or anxious. Denies use of substances, denies any psych hx. Not taking psychotropics. he resides alone, but has children and ex wife in community.   He is retired.   He denies any type of SI, HI, Ah, VH, Pi at this time.  Sleep and appetite are satisfying.

## 2019-09-04 NOTE — DISCHARGE NOTE NURSING/CASE MANAGEMENT/SOCIAL WORK - NSDCVIVACCINE_GEN_ALL_CORE_FT
Problem: At Risk for Falls  Goal: # Patient does not fall  Outcome: Outcome Met, Continue evaluating goal progress toward completion  Pt did not fall during my care. Pt using call light appropriately, wearing  socks, using gait belt and walker.     Problem: VTE, Risk for  Goal: # No s/s of VTE  Outcome: Outcome Met, Continue evaluating goal progress toward completion  No s/s of VTE during my care. Pt wearing TEDs and AV boots as well as pumping her ankles.       In[place] , 2019/9/4 12:23 , Miryam Ma (RN)

## 2019-09-04 NOTE — PROGRESS NOTE BEHAVIORAL HEALTH - RISK ASSESSMENT
LOW TO MODERATE RISK    ACUTE RISK FACTORS: AMS,     PROTECTIVE FACTORS: no prior / current suicidal ideation/intent/plan or suicide attempt; no depressed mood; supportive family; access to healthcare.
LOW TO MODERATE RISK    ACUTE RISK FACTORS: AMS,     PROTECTIVE FACTORS: no prior / current suicidal ideation/intent/plan or suicide attempt; no depressed mood; supportive family; access to healthcare.

## 2019-09-04 NOTE — DISCHARGE NOTE NURSING/CASE MANAGEMENT/SOCIAL WORK - PATIENT PORTAL LINK FT
You can access the FollowMyHealth Patient Portal offered by [facility] by registering at the following website: [email or web address]. By joining [employer]’s FollowMyHealth portal, you will also be able to view your health information using other applications (apps) compatible with our system.

## 2019-09-04 NOTE — PROGRESS NOTE BEHAVIORAL HEALTH - NSBHCHARTREVIEWINVESTIGATE_PSY_A_CORE FT
Ventricular Rate 52 BPM    Atrial Rate 52 BPM    P-R Interval 126 ms    QRS Duration 98 ms    Q-T Interval 460 ms    QTC Calculation(Bezet) 427 ms    P Axis 68 degrees    R Axis 21 degrees    T Axis 60 degrees    Diagnosis Line Sinus bradycardia  Otherwise normal ECG  When compared with ECG of 02-SEP-2019 13:16,  Criteria for Septal infarct are no longer Present  Confirmed by SHIRA ACEVES MD ([id number]) on 9/3/2019 10:21:37 AM
Ventricular Rate 52 BPM    Atrial Rate 52 BPM    P-R Interval 126 ms    QRS Duration 98 ms    Q-T Interval 460 ms    QTC Calculation(Bezet) 427 ms    P Axis 68 degrees    R Axis 21 degrees    T Axis 60 degrees    Diagnosis Line Sinus bradycardia  Otherwise normal ECG  When compared with ECG of 02-SEP-2019 13:16,  Criteria for Septal infarct are no longer Present  Confirmed by SHIRA ACEVES MD ([id number]) on 9/3/2019 10:21:37 AM

## 2019-09-04 NOTE — DISCHARGE NOTE PROVIDER - HOSPITAL COURSE
68 yo M with pmh "thyroid disorder"  h/o remote PUD, h/o ETOH abuse > 10 years sober, h/o polysubstance abuse sober also sober > 10 years per son, unemployed h/o unknown mental illness and hospitalized in  and treated with haldol and other medications (stopped haldol due to tardive dyskinesia) now off all medications for > 30 years presenting with 3 days of insomnia, altered mentation, pressured speech and hallucinations. Per son Morgan Roche, states his father has been wandering on [employer] at very late hours of the night stating that he is with his mother and father both who are . Son also endorses that he dad is speaking incoherently, very pressured, not making sense and verbose which is not typical behavior for him. His father has been unemployed since the  when he was found to have a " mental illness," however he is not sure what the diagnosis was and he is does not follow up with a therapist. No new stressors. No recent illness. 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 Has also has seen Vicky De La Torre but [employer] has not seen her since her primary office closed            9/3: no complaints    awake , alert    NH3 28    Cr normalized to 1.12    LFTs elevated        19- Patient seen and examined at bedside. States he feels well, not sure why or how he came to hospital. States he did have similar episode with AMS in the , was put on medication and was being followed by Psych outpatient, but has not followed up and stopped medications for few years. Currently states he feels well, denies any CP, SOB, dizziness, abd pain. Eager to go home.        Physical Exam:    General: Well developed, well nourished, NAD    HEENT: NCAT, PERRLA, EOMI bl    Neurology: A&Ox3, nonfocal, CN II-XII grossly intact, sensation intact    Respiratory: CTA B/L, No W/R/R    CV: RRR, +S1/S2    Abdominal: Soft, NT, ND +BSx4    Extremities: No C/C/E, + peripheral pulses    Skin: warm, dry        #Acute psychosis r/o organic causes vs primary manic episode    -resolved    -ammonia normalized    -ALYSSA normalized    -Psych f/u appreciated; psych stable for d/c        #Acute renal failure:     resolved with iv fluids        #Transaminitis with mild elevation in ammonia level:     trending down; likely form dehydration + cirrhosis    US abdo shows cirrhosis + hemangioma of liver    AST improving, can follow up with GI as outpatient for further workup        Total time spent on discharge including coordination of care: 40 minutes

## 2019-09-06 DIAGNOSIS — E86.0 DEHYDRATION: ICD-10-CM

## 2019-09-06 DIAGNOSIS — F05 DELIRIUM DUE TO KNOWN PHYSIOLOGICAL CONDITION: ICD-10-CM

## 2019-09-06 DIAGNOSIS — D18.03 HEMANGIOMA OF INTRA-ABDOMINAL STRUCTURES: ICD-10-CM

## 2019-09-06 DIAGNOSIS — K74.60 UNSPECIFIED CIRRHOSIS OF LIVER: ICD-10-CM

## 2019-09-06 DIAGNOSIS — N17.9 ACUTE KIDNEY FAILURE, UNSPECIFIED: ICD-10-CM

## 2019-09-06 DIAGNOSIS — G47.00 INSOMNIA, UNSPECIFIED: ICD-10-CM

## 2019-09-11 ENCOUNTER — EMERGENCY (EMERGENCY)
Facility: HOSPITAL | Age: 69
LOS: 1 days | Discharge: HOME | End: 2019-09-11
Attending: EMERGENCY MEDICINE
Payer: MEDICARE

## 2019-09-11 VITALS
HEART RATE: 76 BPM | SYSTOLIC BLOOD PRESSURE: 123 MMHG | HEIGHT: 63 IN | TEMPERATURE: 98 F | RESPIRATION RATE: 16 BRPM | WEIGHT: 179.9 LBS | DIASTOLIC BLOOD PRESSURE: 73 MMHG | OXYGEN SATURATION: 98 %

## 2019-09-11 DIAGNOSIS — F03.90 UNSPECIFIED DEMENTIA WITHOUT BEHAVIORAL DISTURBANCE: ICD-10-CM

## 2019-09-11 DIAGNOSIS — R46.2 STRANGE AND INEXPLICABLE BEHAVIOR: ICD-10-CM

## 2019-09-11 DIAGNOSIS — G47.00 INSOMNIA, UNSPECIFIED: ICD-10-CM

## 2019-09-11 DIAGNOSIS — R41.82 ALTERED MENTAL STATUS, UNSPECIFIED: ICD-10-CM

## 2019-09-11 DIAGNOSIS — Z79.899 OTHER LONG TERM (CURRENT) DRUG THERAPY: ICD-10-CM

## 2019-09-11 LAB
ALBUMIN SERPL ELPH-MCNC: 3.8 G/DL — SIGNIFICANT CHANGE UP (ref 3.3–5)
ALP SERPL-CCNC: 49 U/L — SIGNIFICANT CHANGE UP (ref 40–120)
ALT FLD-CCNC: 30 U/L — SIGNIFICANT CHANGE UP (ref 12–78)
ANION GAP SERPL CALC-SCNC: 9 MMOL/L — SIGNIFICANT CHANGE UP (ref 5–17)
APAP SERPL-MCNC: <2 UG/ML — LOW (ref 10–30)
APPEARANCE UR: CLEAR — SIGNIFICANT CHANGE UP
AST SERPL-CCNC: 27 U/L — SIGNIFICANT CHANGE UP (ref 15–37)
BASOPHILS # BLD AUTO: 0.06 K/UL — SIGNIFICANT CHANGE UP (ref 0–0.2)
BASOPHILS NFR BLD AUTO: 0.8 % — SIGNIFICANT CHANGE UP (ref 0–2)
BILIRUB SERPL-MCNC: 0.6 MG/DL — SIGNIFICANT CHANGE UP (ref 0.2–1.2)
BILIRUB UR-MCNC: NEGATIVE — SIGNIFICANT CHANGE UP
BUN SERPL-MCNC: 18 MG/DL — SIGNIFICANT CHANGE UP (ref 7–23)
CALCIUM SERPL-MCNC: 9.3 MG/DL — SIGNIFICANT CHANGE UP (ref 8.5–10.1)
CHLORIDE SERPL-SCNC: 104 MMOL/L — SIGNIFICANT CHANGE UP (ref 96–108)
CO2 SERPL-SCNC: 28 MMOL/L — SIGNIFICANT CHANGE UP (ref 22–31)
COLOR SPEC: YELLOW — SIGNIFICANT CHANGE UP
CREAT SERPL-MCNC: 1.3 MG/DL — SIGNIFICANT CHANGE UP (ref 0.5–1.3)
DIFF PNL FLD: NEGATIVE — SIGNIFICANT CHANGE UP
EOSINOPHIL # BLD AUTO: 0.88 K/UL — HIGH (ref 0–0.5)
EOSINOPHIL NFR BLD AUTO: 12.4 % — HIGH (ref 0–6)
ETHANOL SERPL-MCNC: <10 MG/DL — SIGNIFICANT CHANGE UP (ref 0–10)
GLUCOSE SERPL-MCNC: 88 MG/DL — SIGNIFICANT CHANGE UP (ref 70–99)
GLUCOSE UR QL: NEGATIVE MG/DL — SIGNIFICANT CHANGE UP
HCT VFR BLD CALC: 38.1 % — LOW (ref 39–50)
HGB BLD-MCNC: 12.6 G/DL — LOW (ref 13–17)
IMM GRANULOCYTES NFR BLD AUTO: 0.1 % — SIGNIFICANT CHANGE UP (ref 0–1.5)
KETONES UR-MCNC: NEGATIVE — SIGNIFICANT CHANGE UP
LEUKOCYTE ESTERASE UR-ACNC: NEGATIVE — SIGNIFICANT CHANGE UP
LYMPHOCYTES # BLD AUTO: 1.54 K/UL — SIGNIFICANT CHANGE UP (ref 1–3.3)
LYMPHOCYTES # BLD AUTO: 21.8 % — SIGNIFICANT CHANGE UP (ref 13–44)
MCHC RBC-ENTMCNC: 29.3 PG — SIGNIFICANT CHANGE UP (ref 27–34)
MCHC RBC-ENTMCNC: 33.1 GM/DL — SIGNIFICANT CHANGE UP (ref 32–36)
MCV RBC AUTO: 88.6 FL — SIGNIFICANT CHANGE UP (ref 80–100)
MONOCYTES # BLD AUTO: 0.66 K/UL — SIGNIFICANT CHANGE UP (ref 0–0.9)
MONOCYTES NFR BLD AUTO: 9.3 % — SIGNIFICANT CHANGE UP (ref 2–14)
NEUTROPHILS # BLD AUTO: 3.92 K/UL — SIGNIFICANT CHANGE UP (ref 1.8–7.4)
NEUTROPHILS NFR BLD AUTO: 55.6 % — SIGNIFICANT CHANGE UP (ref 43–77)
NITRITE UR-MCNC: NEGATIVE — SIGNIFICANT CHANGE UP
PCP SPEC-MCNC: SIGNIFICANT CHANGE UP
PH UR: 8 — SIGNIFICANT CHANGE UP (ref 5–8)
PLATELET # BLD AUTO: 257 K/UL — SIGNIFICANT CHANGE UP (ref 150–400)
POTASSIUM SERPL-MCNC: 3.4 MMOL/L — LOW (ref 3.5–5.3)
POTASSIUM SERPL-SCNC: 3.4 MMOL/L — LOW (ref 3.5–5.3)
PROT SERPL-MCNC: 7.3 GM/DL — SIGNIFICANT CHANGE UP (ref 6–8.3)
PROT UR-MCNC: NEGATIVE MG/DL — SIGNIFICANT CHANGE UP
RBC # BLD: 4.3 M/UL — SIGNIFICANT CHANGE UP (ref 4.2–5.8)
RBC # FLD: 13.6 % — SIGNIFICANT CHANGE UP (ref 10.3–14.5)
SALICYLATES SERPL-MCNC: 2.4 MG/DL — LOW (ref 2.8–20)
SODIUM SERPL-SCNC: 141 MMOL/L — SIGNIFICANT CHANGE UP (ref 135–145)
SP GR SPEC: 1.01 — SIGNIFICANT CHANGE UP (ref 1.01–1.02)
UROBILINOGEN FLD QL: NEGATIVE MG/DL — SIGNIFICANT CHANGE UP
WBC # BLD: 7.07 K/UL — SIGNIFICANT CHANGE UP (ref 3.8–10.5)
WBC # FLD AUTO: 7.07 K/UL — SIGNIFICANT CHANGE UP (ref 3.8–10.5)

## 2019-09-11 PROCEDURE — 80053 COMPREHEN METABOLIC PANEL: CPT

## 2019-09-11 PROCEDURE — 93005 ELECTROCARDIOGRAM TRACING: CPT

## 2019-09-11 PROCEDURE — 99285 EMERGENCY DEPT VISIT HI MDM: CPT

## 2019-09-11 PROCEDURE — 81003 URINALYSIS AUTO W/O SCOPE: CPT

## 2019-09-11 PROCEDURE — 36415 COLL VENOUS BLD VENIPUNCTURE: CPT

## 2019-09-11 PROCEDURE — 93010 ELECTROCARDIOGRAM REPORT: CPT

## 2019-09-11 PROCEDURE — 85025 COMPLETE CBC W/AUTO DIFF WBC: CPT

## 2019-09-11 PROCEDURE — 80307 DRUG TEST PRSMV CHEM ANLYZR: CPT

## 2019-09-11 NOTE — ED ADULT NURSE NOTE - OBJECTIVE STATEMENT
Patient unsure why he is here.  He has no complaints.  According to PD, his landlord keeps calling the patient's son saying that he keeps wandering the streets at night.  Son wants patient to have a psych eval.

## 2019-09-11 NOTE — ED PROVIDER NOTE - PATIENT PORTAL LINK FT
You can access the FollowMyHealth Patient Portal offered by [facility] by registering at the following website: [email or web address]. By joining [employer]’s FollowMyHealth portal, you will also be able to view your health information using other applications (apps) compatible with our system. You can access the FollowMyHealth Patient Portal offered by [facility] by registering at the following website: [email or web address]. By joining [employer]’s FollowMyHealth portal, you will also be able to view your health information using other applications (apps) compatible with our system.

## 2019-09-11 NOTE — ED ADULT TRIAGE NOTE - CHIEF COMPLAINT QUOTE
PT BIBPD after son called to get pt evaluated by psych, pt has not been sleeping in days, and wandering the streets at night unsafely.  pt denies si/hi, calm and cooperative upon arrival to ed.  badge #[id number]

## 2019-09-11 NOTE — ED PROVIDER NOTE - PROGRESS NOTE DETAILS
d/w telepsych for consult. MD LILLIANA spoke to telepsych KATIE Dobson DO spoke to telepsych possible early dementia concern for sfaety in living situation will keep for sw in the morning KATIE Moon DO pt signed out to me pending SW eval - SW pt to go to [facility] for respite housing. vss pt resting comfortably. labs unremarkable. Jose F Crow M.D., Attending Physician Did not receive signout on this patient bc he was discharged.  Pt refused transport to rehab, as per nursing.  Therefore he was discharged.  Family refuses to take him home and additionally cannot return to ER today to help him get to [facility].  Family states he is not safe to go home.  SW aware.  Nursing admin aware.  Plan is to determine competency in the morning, for family to return in the morning and for SW to attempt re-placement in [facility] tomorrow.  Pt will board in the ED overnight again. pt remains in er, calm, cooperative. gait steady walking to bathroom. diet ordered. psych eval pending for competency KATIE Dobson DO spoke with  Eden Dang,  spoke with psychiatry dr goldberg capcacity is only for a single medical decision not for disposition. B Olya DO pt able to be discharged to home as per . pt is awake and alert and oriented to person, place and time. social will call GREG Dobson DO

## 2019-09-11 NOTE — ED PROVIDER NOTE - PSYCHIATRIC, MLM
Alert and oriented to person, place, time/situation. normal mood and affect. no apparent risk to self or others. +rapid speech. Tangential thoughts. denies SI or HI.

## 2019-09-11 NOTE — ED PROVIDER NOTE - CONSTITUTIONAL, MLM
normal... awake, alert, oriented to person, place, time/situation and in no apparent distress. +Thin, chronically ill appearing.

## 2019-09-11 NOTE — ED PROVIDER NOTE - OBJECTIVE STATEMENT
70 y/o male with no pertinent PMHx presents to the ED BIB PD c/o AMS.  states pt's son is concerned about his well being. Pt son believes that he has not sleeping or eating well, but pt denies. Denies loss of appetite. Pt was seen on 9/2/19 due to son's concern of erratic behavior.  Pt was admitted to hospital once before in 1984 for erratic behavior and was previously on medications that he hasn't taken for 35 years. denies SI or HI. Nonsmoker. +vapes. H/o drug abuse and EtOH abuse. No EtOH. NKDA.

## 2019-09-11 NOTE — ED ADULT NURSE NOTE - CHIEF COMPLAINT QUOTE
PT BIBPD after son called to get pt evaluated by psych, pt has not been sleeping in days, and wandering the streets at night unsafely.  pt denies si/hi, calm and cooperative upon arrival to ed.  badge #[id number]

## 2019-09-11 NOTE — ED ADULT NURSE NOTE - ED STAT RN HANDOFF DETAILS
Pt received with constant observation in place for safety. Awaiting SWK consult at this time.  Declines food or drink at this time.  Will cont to monitor.

## 2019-09-12 DIAGNOSIS — F03.91 UNSPECIFIED DEMENTIA WITH BEHAVIORAL DISTURBANCE: ICD-10-CM

## 2019-09-12 PROCEDURE — 90792 PSYCH DIAG EVAL W/MED SRVCS: CPT | Mod: GT

## 2019-09-12 NOTE — ED BEHAVIORAL HEALTH ASSESSMENT NOTE - SUMMARY
69 year-old male, domiciled alone, PMHx of hypothyroid, remote alcohol use disorder with history of one prior psych admission ~1985 for AMS, with no current psychiatric treatment, 1 recent medical admission for w/u of AMS, represents with continued bizarre behaviors including wandering at night and walking into neighbor's homes.     Patient presents with acute change in mentation, indicative of delirium with unknown mentation. Concern for dementia picture given his difficulty with memory and understanding of situation. It is unlikely patient is having psychotic episode given stability for > 35 years with no medication management. Patient has no prior / current suicidal ideation/intent/plan. Patient has family support. Patient should be seen by neurology to r/o any underlying brain pathology and be seen by social work to determine safe dispo. Not safe for discharge at this time.

## 2019-09-12 NOTE — ED ADULT NURSE REASSESSMENT NOTE - NS ED NURSE REASSESS COMMENT FT1
When transport arrived for arranged placement for pt, pt refused to go to facility.  Transport notified RN and ONEYDA and MD to bedside. Pt cont to refuse placement at this time. Pt family and facility contacted by RN and by ONEYDA.  Pt family states they are unable to come to ED at this time.  Nursing ANM and nursing supervision contacted and aware that pt to stay overnight in ED for consultation for competency.  Pt resting in bed at this time. When transport arrived for arranged placement for pt, pt refused to go to facility.  Transport notified RN and ONEYDA and MD to bedside. Pt cont to refuse placement at this time. Pt family and facility contacted by RN and by ONEYDA.  Pt family states they are unable to come to ED at this time.  Nursing ANM and nursing supervision contacted and aware that pt to stay overnight in ED for consultation for competency.  Pt son and daughter-in-law verbalized understanding of POC.  Pt resting in bed at this time. When transport arrived for arranged placement for pt, pt refused to go to facility.  Transport notified RN and ONEYDA and MD to bedside. Pt cont to refuse placement at this time. Pt family and facility contacted by RN and by ONEYDA.  Pt family states they are unable to come to ED at this time.  ED MD order competency and will hold overnight.  Nursing ANM and nursing supervision contacted and aware that pt to stay overnight in ED for consultation for competency.  Pt son and daughter-in-law verbalized understanding of POC.  Pt resting in bed at this time. When transport arrived for arranged placement for pt, pt refused to go to facility.  Transport notified RN and ONEYDA and MD to bedside. Pt cont to refuse placement at this time. Pt family and facility contacted by RN and by ONEYDA.  Pt family states they are unable to come to ED at this time, but that they feel that he is unsafe to be discharged to live by himself.  ED MD order competency and will hold overnight.  Nursing ANM and nursing supervision contacted and aware that pt to stay overnight in ED for consultation for competency.  Pt son and daughter-in-law verbalized understanding of POC.  Pt resting in bed at this time.

## 2019-09-12 NOTE — ED BEHAVIORAL HEALTH ASSESSMENT NOTE - HPI (INCLUDE ILLNESS QUALITY, SEVERITY, DURATION, TIMING, CONTEXT, MODIFYING FACTORS, ASSOCIATED SIGNS AND SYMPTOMS)
69 year-old male, domiciled alone, PMHx of hypothyroid, remote alcohol use disorder with history of one prior psych admission ~1985 for AMS, with no current psychiatric treatment, 1 recent medical admission for w/u of AMS, represents with continued bizarre behaviors including wandering at night and walking into neighbor's homes.     Patient on exam states he does not know why he is in the emergency room. When asked for date he pauses, seemingly thinking deeply about response, and then is able to answer the correct date. Immediate recall is 3/3 however delayed recall is 1/3 with repetition of same word three times. Is unable to recite months of year in reverse order, repeating "September would be September, then October" and gets stuck on this exercise. Mood reported as "I feel good, no problems." When asked about sleep he rambles about 6 vs 7 hours and does not give a cohesive answer. States he lives alone and has 4 adult children. Believes that he was in hospital earlier in the month for "constipation". 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 States he son "is the one who gets paranoid" and denies any recent behaviors such as roaming streets at night or going into neighbors homes. Denies SI/HI/violent thoughts.     Patient was seen by psychiatry during most recent medical admission with concern for delirium. Was recommended 25mg seroquel bedtime. Unclear compliance since hospitalization.     Collateral states patient seemed at baseline for a day or two but then for the past 6 days has been having the same issues. The landlord has been calling the son frequently that he seems to be restless and up all night, patient has been leaving the home frequently and walking down [address] and also walked aimlessly into neighbor's home. Collateral states patient has been making bizarre statements about his sister being next door but his sister lives in [place]. Patient also told his family when on the phone yesterday that his son was at his house but being eaten, however the son was not present. Collateral denies patient has been using any drugs or Etoh to his knowledge despite a history of drug and Etoh abuse in the past. Collateral denies patient has voiced SI/HI, no reported AH/VH, no violence. Collateral feels patient is unsafe in the community at this time as he is wandering and appears to be in an altered mental state. Collateral prefers patient to be admitted for stabilization and safety or at least set up with outpatient psychiatric care.   .

## 2019-09-12 NOTE — ED ADULT NURSE REASSESSMENT NOTE - NS ED NURSE REASSESS COMMENT FT1
report received from MIGUE venegas. pt denies pain, resting comfortably in stretcher. vitals taken. pt reports feeling hungry, given sandwich. Pt In no acute distress. pending evaluation from psych for competency. will ctm

## 2019-09-12 NOTE — ED BEHAVIORAL HEALTH ASSESSMENT NOTE - REASON
hold for social work/neurology consult due to concern for dementia/unsafe to return to living conditions

## 2019-09-12 NOTE — ED BEHAVIORAL HEALTH ASSESSMENT NOTE - RISK ASSESSMENT
Acute Suicide Risk  (  ) High   (  ) Moderate   (x  ) Low   (  ) Unable to determine   Rationale ____no known SI_______     Elevated Chronic Risk   ( x ) Yes __impairment in judgement/insight_________  Details ___________  (  ) No   ___________

## 2019-09-12 NOTE — ED ADULT NURSE REASSESSMENT NOTE - NS ED NURSE REASSESS COMMENT FT1
Patient ambulating with 1:1 around the unit, he is upset he is still here but cooperative.  He is saying he is "supposed to be getting  right now."  He is still answering all questions appropriately.  He has not slept yet tonight.  Will continue to monitor.

## 2019-09-12 NOTE — ED BEHAVIORAL HEALTH ASSESSMENT NOTE - DIFFERENTIAL
r/o dementia or other organic etiology. lower on differential is mood disorder or psychosis given his stability for >30  years     C-SSRS Screener   1. Have you ever wished to be dead or wished you could go to sleep and not wake up?  [  ]Yes, [ x ]No, [  ]Unable to Assess  Details _____________________________   2. Have you actually had any thoughts of killing yourself?   [  ]Yes, [x  ]No, [  ]Unable to Assess  Details _____________________________   If answer is “No” for 1 and 2, stop here. If answer is “Yes” to 1 or 2, proceed to 3.   3. Have you been thinking about how you might kill yourself?  [  ]Yes, [  ]No, [  ]Unable to Assess  Details _____________________________   4. Have you had these thoughts and had some intention of acting on them?  [  ]Yes, [  ]No, [  ]Unable to Assess  Details _____________________________  5. Have you started to work out or worked out the details of how to kill yourself? Do you intend to carry out this plan?  [  ]Yes, [  ]No, [  ]Unable to Assess  Details _____________________________  6. Have you ever done anything, started to do anything, or prepared to do anything to end your life? If so, was it in the past 3 months?  [  ]Yes, [  ]No, [  ]Unable to Assess  Details _____________________________   Additional Suicide Risk Factors (select all that apply)  [  ]Access to lethal means including firearms  [  ]Family history of suicide  [ x ]Impulsivity  [  ] Current or past mood disorder  [  ] Current or past psychotic disorder  [  ] Current or past PTSD  [  ] Current or past ADHD  [  ] Current or past TBI  [  ] Current or past cluster B personality disorder or traits  [  ] Current or past conduct problems  [  ] Recent onset of current or past psychiatric disorder  [  ] Family history of psychiatric diagnoses requiring hospitalization  Additional Activating Events (select all that apply)  [  ]Perceived burden on family or others  [  ]Current sexual or physical abuse  [  ]Substance intoxication or withdrawal  [x  ]Inadequate social supports  [  ]Hopeless about or dissatisfied with current provider or treatment  Additional Protective Factors (select all that apply)  [  ] Future plans  [  ] Cheondoism beliefs  [  ] Beloved pets

## 2019-09-12 NOTE — ED ADULT NURSE REASSESSMENT NOTE - NS ED NURSE REASSESS COMMENT FT1
Pt cont to be maintained on constant observation. Spoke with SWK and will be following up on case.  Will cont to monitor.

## 2019-09-12 NOTE — ED BEHAVIORAL HEALTH ASSESSMENT NOTE - OTHER
AMS defer somewhat pressured at times mumbling at times "fine" concrete or rambling does not appear to be RIS external lives in building; has landlord

## 2019-09-12 NOTE — ED ADULT NURSE REASSESSMENT NOTE - NS ED NURSE REASSESS COMMENT FT1
Pt noted to become agitated, stating that he feels he should be in another doctor's office on [address] (although unable to remember MD name or specific details) and that he should never have been in the hospital.  Pt eventually verbalizes understanding that he is waiting for another consult later this am, but continues to state he should never have been in ED.  Pt safety cont to be maintained with constant observation in place. Pt assisted with breakfast.  Pt unable to remember that he was offered breakfast earlier and unable to remember who he spoke to earlier in his stay in ED.  Pt cont to be able to answer most orienting questions appropriately.  Gait steady.  MD notified and will cont to monitor.

## 2019-09-13 VITALS
SYSTOLIC BLOOD PRESSURE: 120 MMHG | TEMPERATURE: 98 F | OXYGEN SATURATION: 97 % | HEART RATE: 60 BPM | DIASTOLIC BLOOD PRESSURE: 66 MMHG | RESPIRATION RATE: 16 BRPM

## 2019-09-24 ENCOUNTER — EMERGENCY (EMERGENCY)
Facility: HOSPITAL | Age: 69
LOS: 1 days | Discharge: OTHER INSTITUTION | End: 2019-09-24
Attending: STUDENT IN AN ORGANIZED HEALTH CARE EDUCATION/TRAINING PROGRAM
Payer: MEDICARE

## 2019-09-24 VITALS
HEIGHT: 61 IN | RESPIRATION RATE: 18 BRPM | SYSTOLIC BLOOD PRESSURE: 98 MMHG | TEMPERATURE: 98 F | OXYGEN SATURATION: 99 % | WEIGHT: 121.92 LBS | DIASTOLIC BLOOD PRESSURE: 66 MMHG | HEART RATE: 72 BPM

## 2019-09-24 DIAGNOSIS — R69 ILLNESS, UNSPECIFIED: ICD-10-CM

## 2019-09-24 PROBLEM — F19.10 OTHER PSYCHOACTIVE SUBSTANCE ABUSE, UNCOMPLICATED: Chronic | Status: ACTIVE | Noted: 2019-09-15

## 2019-09-24 PROBLEM — F10.10 ALCOHOL ABUSE, UNCOMPLICATED: Chronic | Status: ACTIVE | Noted: 2019-09-15

## 2019-09-24 LAB
ALBUMIN SERPL ELPH-MCNC: 4.2 G/DL — SIGNIFICANT CHANGE UP (ref 3.3–5.2)
ALP SERPL-CCNC: 47 U/L — SIGNIFICANT CHANGE UP (ref 40–120)
ALT FLD-CCNC: 16 U/L — SIGNIFICANT CHANGE UP
AMPHET UR-MCNC: NEGATIVE — SIGNIFICANT CHANGE UP
ANION GAP SERPL CALC-SCNC: 11 MMOL/L — SIGNIFICANT CHANGE UP (ref 5–17)
APPEARANCE UR: CLEAR — SIGNIFICANT CHANGE UP
AST SERPL-CCNC: 29 U/L — SIGNIFICANT CHANGE UP
BARBITURATES UR SCN-MCNC: NEGATIVE — SIGNIFICANT CHANGE UP
BENZODIAZ UR-MCNC: NEGATIVE — SIGNIFICANT CHANGE UP
BILIRUB SERPL-MCNC: 0.6 MG/DL — SIGNIFICANT CHANGE UP (ref 0.4–2)
BILIRUB UR-MCNC: NEGATIVE — SIGNIFICANT CHANGE UP
BUN SERPL-MCNC: 14 MG/DL — SIGNIFICANT CHANGE UP (ref 8–20)
CALCIUM SERPL-MCNC: 9.6 MG/DL — SIGNIFICANT CHANGE UP (ref 8.6–10.2)
CHLORIDE SERPL-SCNC: 105 MMOL/L — SIGNIFICANT CHANGE UP (ref 98–107)
CO2 SERPL-SCNC: 26 MMOL/L — SIGNIFICANT CHANGE UP (ref 22–29)
COCAINE METAB.OTHER UR-MCNC: NEGATIVE — SIGNIFICANT CHANGE UP
COLOR SPEC: YELLOW — SIGNIFICANT CHANGE UP
CREAT SERPL-MCNC: 1.13 MG/DL — SIGNIFICANT CHANGE UP (ref 0.5–1.3)
DIFF PNL FLD: NEGATIVE — SIGNIFICANT CHANGE UP
GLUCOSE SERPL-MCNC: 88 MG/DL — SIGNIFICANT CHANGE UP (ref 70–115)
GLUCOSE UR QL: NEGATIVE MG/DL — SIGNIFICANT CHANGE UP
HCT VFR BLD CALC: 42.8 % — SIGNIFICANT CHANGE UP (ref 39–50)
HGB BLD-MCNC: 13.6 G/DL — SIGNIFICANT CHANGE UP (ref 13–17)
KETONES UR-MCNC: NEGATIVE — SIGNIFICANT CHANGE UP
LEUKOCYTE ESTERASE UR-ACNC: NEGATIVE — SIGNIFICANT CHANGE UP
LIDOCAIN IGE QN: 24 U/L — SIGNIFICANT CHANGE UP (ref 22–51)
MCHC RBC-ENTMCNC: 29.1 PG — SIGNIFICANT CHANGE UP (ref 27–34)
MCHC RBC-ENTMCNC: 31.8 GM/DL — LOW (ref 32–36)
MCV RBC AUTO: 91.6 FL — SIGNIFICANT CHANGE UP (ref 80–100)
METHADONE UR-MCNC: NEGATIVE — SIGNIFICANT CHANGE UP
NITRITE UR-MCNC: NEGATIVE — SIGNIFICANT CHANGE UP
OPIATES UR-MCNC: NEGATIVE — SIGNIFICANT CHANGE UP
PCP SPEC-MCNC: SIGNIFICANT CHANGE UP
PCP UR-MCNC: NEGATIVE — SIGNIFICANT CHANGE UP
PH UR: 8 — SIGNIFICANT CHANGE UP (ref 5–8)
PLATELET # BLD AUTO: 233 K/UL — SIGNIFICANT CHANGE UP (ref 150–400)
POTASSIUM SERPL-MCNC: 4 MMOL/L — SIGNIFICANT CHANGE UP (ref 3.5–5.3)
POTASSIUM SERPL-SCNC: 4 MMOL/L — SIGNIFICANT CHANGE UP (ref 3.5–5.3)
PROT SERPL-MCNC: 6.9 G/DL — SIGNIFICANT CHANGE UP (ref 6.6–8.7)
PROT UR-MCNC: NEGATIVE MG/DL — SIGNIFICANT CHANGE UP
RBC # BLD: 4.67 M/UL — SIGNIFICANT CHANGE UP (ref 4.2–5.8)
RBC # FLD: 13.7 % — SIGNIFICANT CHANGE UP (ref 10.3–14.5)
SODIUM SERPL-SCNC: 142 MMOL/L — SIGNIFICANT CHANGE UP (ref 135–145)
SP GR SPEC: 1.01 — SIGNIFICANT CHANGE UP (ref 1.01–1.02)
THC UR QL: NEGATIVE — SIGNIFICANT CHANGE UP
TSH SERPL-MCNC: 0.8 UIU/ML — SIGNIFICANT CHANGE UP (ref 0.27–4.2)
UROBILINOGEN FLD QL: NEGATIVE MG/DL — SIGNIFICANT CHANGE UP
WBC # BLD: 6.69 K/UL — SIGNIFICANT CHANGE UP (ref 3.8–10.5)
WBC # FLD AUTO: 6.69 K/UL — SIGNIFICANT CHANGE UP (ref 3.8–10.5)

## 2019-09-24 PROCEDURE — 80053 COMPREHEN METABOLIC PANEL: CPT

## 2019-09-24 PROCEDURE — 93010 ELECTROCARDIOGRAM REPORT: CPT

## 2019-09-24 PROCEDURE — 84443 ASSAY THYROID STIM HORMONE: CPT

## 2019-09-24 PROCEDURE — 99285 EMERGENCY DEPT VISIT HI MDM: CPT

## 2019-09-24 PROCEDURE — 93005 ELECTROCARDIOGRAM TRACING: CPT

## 2019-09-24 PROCEDURE — 81003 URINALYSIS AUTO W/O SCOPE: CPT

## 2019-09-24 PROCEDURE — 80307 DRUG TEST PRSMV CHEM ANLYZR: CPT

## 2019-09-24 PROCEDURE — 36415 COLL VENOUS BLD VENIPUNCTURE: CPT

## 2019-09-24 PROCEDURE — 83690 ASSAY OF LIPASE: CPT

## 2019-09-24 PROCEDURE — 90792 PSYCH DIAG EVAL W/MED SRVCS: CPT

## 2019-09-24 PROCEDURE — 85027 COMPLETE CBC AUTOMATED: CPT

## 2019-09-24 NOTE — ED ADULT NURSE NOTE - OBJECTIVE STATEMENT
pt offers no complaints at this time, states that he is unsure why he was brought to the ED today but only offers "my son been to [place]" implying that he has mental health issues.  pt denies SI, HI, AVH, and provides no narrative that he is paranoid. pt has a full affect.

## 2019-09-24 NOTE — ED PROVIDER NOTE - CLINICAL SUMMARY MEDICAL DECISION MAKING FREE TEXT BOX
Pt p/w bizarre behavior and aggression, recent medical clearance at [facility]. Will rpt labs and send to  for eval.

## 2019-09-24 NOTE — ED STATDOCS - PROGRESS NOTE DETAILS
70yo M with AMS since early spetember, talking to himself, not acting normally. went to ED x2 and D/C on arrival b/c family states he was AOx3. h/o mental illness unsure diagnosis. had Suicide attempt >30yrs ago. not on meds. Now talking nonsense. not eating. no known trauma. h/o EtOH and drug abuse ~10yrs ago. family believes clean. and patient denies use. Pt denying SI/HIhallucinations but family states voices telling him to kill people. PE- NAD, lungs clear, abd soft, no neuro deficits. had labs and ct in system. unclear etiology of behavior. will send to main for further eval. -John DO

## 2019-09-24 NOTE — ED ADULT TRIAGE NOTE - CHIEF COMPLAINT QUOTE
pt A&OX4, pt has been having increased bizarre behavior since September, as per family pt has been going into neighbors houses, talking to self, and increase aggression... pt denies SI/HI... in waiting room pt left and family and security had to bring pt back to hospital

## 2019-09-24 NOTE — ED ADULT NURSE NOTE - HPI (INCLUDE ILLNESS QUALITY, SEVERITY, DURATION, TIMING, CONTEXT, MODIFYING FACTORS, ASSOCIATED SIGNS AND SYMPTOMS)
the p,t per son, has been acting bizarrely wandering at night, and making statement about "cutting off alligators heads".

## 2019-09-24 NOTE — ED BEHAVIORAL HEALTH ASSESSMENT NOTE - HPI (INCLUDE ILLNESS QUALITY, SEVERITY, DURATION, TIMING, CONTEXT, MODIFYING FACTORS, ASSOCIATED SIGNS AND SYMPTOMS)
69 year-old male, domiciled alone, PMHx of hypothyroid, remote alcohol use disorder with history of one prior psych admission ~1985 for AMS, with no current psychiatric treatment, 1 recent medical admission for w/u of AMS, represents with continued bizarre behaviors including wandering at night and walking into neighbor's homes. Patient was seen by psychiatry during a recent admission to  for acute change in mental status and then again by telepsychiatry 9/12/2019 for bizarre behavior and released from ED.   Patient on exam is A&Ox3. He reports his niece and son brought him to [facility] for renewal of Nexium which he takes for indigestion. Patient reports he has no psychiatric issues and is does not understand why he is being evaluated by psychiatry. Patient reports he lives in [place] has good family support. He denies recent or current depressed mood stating, " I enjoy TV and reading my bible." Denies Buddhist preoccupation. He denies wandering and reports he is sleeping well at night with good appetite. Patient denies recent or current depressed mood, S/h/I/I/P, A/V/H or thoughts of paranoia. He denies h/o of violence or recent substance abuse.   Collateral from on Morgan Roche reports patient continues to wander and since discharge from  was confused and wandered into a neighbors home. On 9/20 patient was seen by outpatient Neurologist who recommended immediate psychiatric evaluation. Today, at the recommendation of the neurologist son took patient to [facility] for psychiatric evaluation and was told to come to [facility]. Son reports patient has been talking to himself and this morning told his niece he was going to " cut off peoples heads and throw them to the alligators." The landlord has been calling the son frequently that he seems to be restless and up all night, patient has been leaving the home frequently recently walked down [address] and also walked aimlessly into neighbor's home. . Collateral denies patient has been using any drugs or Etoh to his knowledge despite a history of drug and Etoh abuse in the past. Collateral denies patient has a h/o violence. Collateral feels patient is unsafe in the community at this time as he is wandering and appears to be in an altered mental state. Collateral prefers patient to be admitted for stabilization and safety.    . 69 year-old male, domiciled alone, PMHx of hypothyroid, remote alcohol use disorder with history of one prior psych admission ~1985 for AMS, with no current psychiatric treatment, 1 recent medical admission for w/u of AMS, represents with continued bizarre behaviors including wandering at night and walking into neighbor's homes. Patient was seen by psychiatry during a recent admission to  for acute change in mental status and then again by telepsychiatry 9/12/2019 for bizarre behavior and released from ED.   Patient on exam is A&Ox3. He reports his niece and son brought him to [facility] for renewal of Nexium which he takes for indigestion. Patient reports he has no psychiatric issues and is does not understand why he is being evaluated by psychiatry. Patient reports he lives in [place] has good family support. He denies recent or current depressed mood stating, " I enjoy TV and reading my bible." Denies Sikh preoccupation. He denies wandering and reports he is sleeping well at night with good appetite. Patient denies recent or current depressed mood, S/h/I/I/P, A/V/H or thoughts of paranoia. He denies h/o of violence or recent substance abuse.   Collateral from on Morgan Roche reports patient continues to wander and since discharge from  was confused and wandered into a neighbors home. On 9/20 patient was seen by outpatient Neurologist who recommended immediate psychiatric evaluation. Today, at the recommendation of the neurologist son took patient to [facility] for psychiatric evaluation and was told to come to [facility]. The landlord has been calling the son frequently that he seems to be restless and up all night, patient has been leaving the home frequently recently walked down [email or web address] and also walked aimlessly into neighbor's home. . Collateral denies patient has been using any drugs or Etoh to his knowledge despite a history of drug and Etoh abuse in the past. Collateral denies patient has a h/o violence. Collateral feels patient is unsafe in the community at this time as he is wandering and appears to be in an altered mental state. Collateral prefers patient to be admitted for stabilization and safety.    Received additional collateral from niece Ms Holland who also accompanied patient to [facility] today. In May 2019 patient went to [place] and was more isolative and appeared to be talking to self. Most recently patient has been forgetting to turn lights off in his home, left the stove on, with progressive memory loss. Today in the context of wanting to go home patient made statement to niece threatening to cut her head off and throw it to the alligators. Niece denies patient was aggressive stating, " he is a gentle person and I did not think he would hurt me." Niece also reports patient has recently made a statement that he wanted to " hurt people in his home." patient has never been aggressive to others but niece is afraid someone will hurt him.   .

## 2019-09-24 NOTE — ED BEHAVIORAL HEALTH ASSESSMENT NOTE - DIFFERENTIAL
r/o dementia or other organic etiology. lower on differential is mood disorder or psychosis given his stability for >30  years  r/o BPD

## 2019-09-24 NOTE — ED BEHAVIORAL HEALTH ASSESSMENT NOTE - DESCRIPTION
Patient initially attempted to leave ED however returned when he saw security.   Patient has been cooperative, not agitated or threatening. None. Rents apartment in [place], retired, never , 4 children

## 2019-09-24 NOTE — ED PROVIDER NOTE - OBJECTIVE STATEMENT
69M no pertinent PMH brought in by family for AMS. Increasing aggression and bizarre behavior since September as per family. Pt denies any complaints, states that nothing is wrong but his family is crazy and say he is. Admits to one prior psych hospitalization "bec of Confucianist that he got rid of". Denies SI/HI. Patient seen at [facility] 9/12/19 for same complaints, pt refused placement and was ultimately discharged.

## 2019-09-24 NOTE — ED BEHAVIORAL HEALTH ASSESSMENT NOTE - SUMMARY
69 year-old male, domiciled alone, PMHx of hypothyroid, remote alcohol use disorder with history of one prior psych admission ~1985 for AMS, with no current psychiatric treatment, 1 recent medical admission for w/u of AMS, evaluated by telepsychiatry 9/12 and discharged with recommendations to f/u with neurology/SW for safe discharge, bib family BIBF from at the recommendation of [facility]. Patient was seen by writer and Dr Yip. Neurology note from 9/20 reviewed. Presentation appears similar to previous evaluation dated 9/12.   Patient is currently A&Ox3. He denies current or recent S/H/I/I/P, A/V/H, thoughts of paranoia or ideas of reference. Patient TP is somewhat tangential and disorganized however he is calm and cooperative. Patient maintains good eye contact and does not appear to be responding to internal stimuli. Patient does not meet criteria for involuntary inpatient  psychiatric hospitalizations. Patient will require more structured environment or home care due to memory loss. 69 year-old male, domiciled alone, PMHx of hypothyroid, remote alcohol use disorder with history of one prior psych admission ~1985 for AMS, with no current psychiatric treatment, 1 recent medical admission for w/u of AMS, evaluated by telepsychiatry 9/12 and discharged with recommendations to f/u with neurology/SW for safe discharge, bib family BIBF from at the recommendation of [facility]. Patient was seen by writer and Dr Yip. Neurology note from 9/20 reviewed. Presentation appears similar to previous evaluation dated 9/12.   Patient is currently A&Ox3. He denies current or recent S/H/I/I/P, A/V/H, thoughts of paranoia or ideas of reference. Patient TP is somewhat tangential and disorganized however he is calm and cooperative. Patient maintains good eye contact and does not appear to be responding to internal stimuli. Patient does not meet criteria for involuntary inpatient  psychiatric hospitalizations. Patient will require more structured environment or home care due to memory loss.  Discussed need for a safe discharge with son and advised son that patient requires a structured environment with more support provided by private hire or family.  Writer also recommended calling PCP to inquire about [facility] benefits through Medicare. Patient should return to outpatient neurologist with previous cat scan of head.

## 2019-09-24 NOTE — ED BEHAVIORAL HEALTH ASSESSMENT NOTE - SUICIDE PROTECTIVE FACTORS
Identifies reasons for living/Supportive social network of family or friends/Responsibility to family and others/Cultural, spiritual and/or moral attitudes against suicide

## 2019-09-24 NOTE — ED ADULT TRIAGE NOTE - WEIGHT IN LBS
121.9
Cassandra's discriminant function is 258 which is confounded by use of Xarelto. Regardless, pt is not a transplant candidate as he is currently abusing ETOH, last drink was this morning.  Encourage alcohol cessation, patient has very poor prognosis at this point.  Need to rule out infection before starting steroids for this.  Lactate unlikely to clear given liver disease.

## 2019-09-25 VITALS
RESPIRATION RATE: 18 BRPM | TEMPERATURE: 98 F | HEART RATE: 52 BPM | SYSTOLIC BLOOD PRESSURE: 91 MMHG | DIASTOLIC BLOOD PRESSURE: 54 MMHG | OXYGEN SATURATION: 100 %

## 2019-09-25 NOTE — ED BEHAVIORAL HEALTH NOTE - BEHAVIORAL HEALTH NOTE
PROGRESS NOTE: 19 @ 14:12  	  • Reason for Ongoing Consultation: 	Dementia and erratic behavior    ID: [id number] Male with HEALTH ISSUES - PROBLEM Dx:  Deferred condition on axis II          INTERVAL DATA:   • Interval Chief Complaint: My wife is picking me up  • Interval History:  Patient has been restless and agitated. His speech continues to be tangential and circumstantial and disorganized at times. He is irritable and does not have any insight into his cognitive deficits.  He is observed speaking to self at times.  He reportedly was stating that voices were talking to his niece through a chip in her tooth. He reports that is wife is picking him up but patient is currently unmarried. He is denying and minimizing symptoms but his behavior has been increasingly erratic and son is concerned about his safety. Patient does not have any supports in place and is not a safe discharge back to home.       REVIEW OF SYSTEMS:   • Constitutional Symptoms	No complaints  • Eyes	No complaints  • Ears / Nose / Throat / Mouth	No complaints  • Cardiovascular	No complaints  • Respiratory	No complaints  • Gastrointestinal	No complaints  • Genitourinary	No complaints  • Musculoskeletal	No complaints  • Skin	No complaints  • Neurological	No complaints  • Psychiatric (see HPI)	See HPI  • Endocrine	No complaints  • Hematologic / Lymphatic	No complaints  • Allergic / Immunologic	No complaints    REVIEW OF VITALS/LABS/IMAGING/INVESTIGATIONS:   • Vital signs reviewed: Yes  • Vital Signs:	    T(C): 36.6 (19 @ 07:19), Max: 36.8 (19 @ 00:32)  HR: 55 (19 @ 07:19) (45 - 55)  BP: 116/73 (19 @ 07:19) (98/56 - 126/62)  RR: 18 (19 @ 07:19) (18 - 20)  SpO2: 99% (19 @ 07:19) (98% - 99%)    • Available labs reviewed: Yes  • Available Lab Results:                           13.6   6.69  )-----------( 233      ( 24 Sep 2019 14:10 )             42.8         142  |  105  |  14.0  ----------------------------<  88  4.0   |  26.0  |  1.13    Ca    9.6      24 Sep 2019 14:10    TPro  6.9  /  Alb  4.2  /  TBili  0.6  /  DBili  x   /  AST  29  /  ALT  16  /  AlkPhos  47  09-24    LIVER FUNCTIONS - ( 24 Sep 2019 14:10 )  Alb: 4.2 g/dL / Pro: 6.9 g/dL / ALK PHOS: 47 U/L / ALT: 16 U/L / AST: 29 U/L / GGT: x             Urinalysis Basic - ( 24 Sep 2019 16:27 )    Color: Yellow / Appearance: Clear / S.010 / pH: x  Gluc: x / Ketone: Negative  / Bili: Negative / Urobili: Negative mg/dL   Blood: x / Protein: Negative mg/dL / Nitrite: Negative   Leuk Esterase: Negative / RBC: x / WBC x   Sq Epi: x / Non Sq Epi: x / Bacteria: x          MEDICATIONS:      PRN Medications:  • PRN Medications since last evaluation	  • PRN Details	    Current Medications:      Medication Side Effects:  • Medication Side Effects or Adverse Reactions (new or ongoing)	None known      SUICIDALITY:   • Suicidality (Interval)	none known    HOMICIDALITY/AGGRESSION:   • Homicidality/Aggression	none known    MENTAL STATUS EXAM:   · General Appearance	Well developed  · Body Habitus	Underweight  · Hygiene	Fair  · Grooming	Fair  · Behavior	Cooperative  · Eye Contact	Good  · Relatedness	Fair  · Impulse Control	Normal  · Muscle Tone / Strength	Normal muscle tone/strength  · Abnormal Movements	No abnormal movements  · Gait / Station	Other  · Other	defer  · Speech Volume	Normal  · Speech Rate	Other  · Other	somewhat pressured at times  · Speech Spontaneity	Normal  · Speech Articulation	Other  · Other	mumbling at times  · Reported mood	Other  · Other	"fine"  · Observed mood	Euthymic  · Affect Range	Full  · Affect Congruence	Congruent  · Thought Process	Tangential/ Impaired reasoning  · Thought Associations	Normal  · Thought Content	Other  · Other	concrete or rambling  · Perceptions	Other  · Other	does not appear to be RIS  · Oriented to Time	Yes  · Oriented to Place	Yes  · Oriented to Situation	No  · Oriented to Person	Yes  · Attention / Concentration	Impaired  · Estimated Intelligence	Average  · Recent Memory	Impaired  · Remote Memory	Normal  · Fund of Knowledge	Normal  · Language	Normal repetition  · Judgment (regarding everyday events)	Poor  · Insight (regarding psychiatric illness)	Poor  · Additional Details / Comments	limited 2/2 AMS/poor memory    FORMULATION:    Formulation:  · Summary (brief)	69 year-old male, domiciled alone, PMHx of hypothyroid, remote alcohol use disorder with history of one prior psych admission ~ for AMS, with no current psychiatric treatment, 1 recent medical admission for w/u of AMS, evaluated by telepsychiatry  and discharged with recommendations to f/u with neurology/SW for safe discharge, bib family BIBF from at the recommendation of [facility]. . Neurology note from  reviewed. Presentation appears similar to previous evaluation dated .  Neurology felt patient needed and urgent psychiatric evaluation.    On intial interview patient was AOX3 He minimized all symptoms and denies current or recent S/H/I/I/P. Patient does make bizarre statements (ie niece is hearing voices) and feels that his family is crazy but that he has no problems. Patient TP is somewhat tangential and disorganized, confabulates and irritable with memory deficits.    Patient has been more verbally threatening, impulsive and has no support in place, as well as little insight into his deficits.  Patient requires inpatient unit for safety and stabilization.      · Differential	likely  dementia  with behavioral disturbance or other organic etiology. lower on differential is mood disorder or psychosis given his stability for >30  years  r/o BPD  · Rationale/Summary (include warning signs, risk indicators, protective factors, access to lethal means, collateral sources used, assessment data, rationale):	Acute Suicide Risk  (  ) High   (  ) Moderate   (x  ) Low   (  ) Unable to determine   Rationale ____no known SI_______     Elevated Chronic Risk   ( x ) Yes __impairment in judgement/insight_________  Details ___________  (  ) No   ___________    DIAGNOSIS DSM-V:    Psychiatric Diagnosis (Corresponds to DSM-IV Axis I, II):   HEALTH ISSUES - PROBLEM Dx: Dementia with behavioral disturbance   Deferred condition on axis II    PLAN  Patient requires inpatient hospitalization for safety and stabilization of symptoms.

## 2019-09-25 NOTE — ED BEHAVIORAL HEALTH NOTE - BEHAVIORAL HEALTH NOTE
BUCK Note: Pt held overnight for  eval and discharge planning. Reviewed recent notes from [facility], 9/13/19. pt was in their ED and  team was working with pt and family on NH placement. They were able to find a respite bed and when they were setting up the discharge the pt refused placement. [facility] treatement providers cleared pt and he returned home. APS referral made at that time.   Tried to meet with pt this morning to discuss discharge planning and recommendation for supervision. The pt was focused on going home. Stated that he does not need supervision and declined any referrals for placement or to continue a discussion about exploring if he could hire private care. The pt was short with his answers, claiming his wife was outside our doors ready to pick him up and that we are stopping her from coming in.   Spoke with his dtr in law, Mariajose ( pts son's Morgan, wife). We reviewed the recent help from  to try to get her father in law a respite bed. She understands that he is still refusing placement. She was not aware that  had made an APS referral and stated that no one had reached out to her or the family from APS. She did confirm that the pt is not  and lives alone.   Will discuss above with  team

## 2019-09-25 NOTE — ED ADULT NURSE REASSESSMENT NOTE - COMFORT CARE
po fluids offered/warm blanket provided/darkened lights
darkened lights
darkened lights
plan of care explained
plan of care explained

## 2019-09-25 NOTE — ED ADULT NURSE REASSESSMENT NOTE - NS ED NURSE REASSESS COMMENT FT1
patient resting at intervals out of bed to bathroom with steady gait patient requesting to be "left alone".  no c/o will monitor for safety
pt remains alert oriented x3, pt cleared by psychiatry, pt's son Morgan contacted to  father but states that he is "not comfortable to  my father" pt's son states that his father has "changed" and is aggressive and argumentative when they are together. pt requires per provider safe discharge, Dr Grant and Dr Yip made aware of the situation.
patient resting nad noted offering no complaints. pt refusing v/s at this time. patient states "I just want to go home, I don't want anything from any of you"
Assumed care of patient at 0710.  Patient resting in bed, appears to be sleeping with no distress noted.  Safety of patient maintained.
Patient endorses he does not need to remain in BH area and he is going to be returning home.  Patient looking out doors and windows expecting family to came to take him home.  No attempts to harm self or others and safety maintained.
Patient is awake out of his room.  Patient offered breakfast but declined.  Patient reports "I just waiting for them to pick me up".  No attempts to harm self or others and safety maintained.
Patient resting in bed since eating 25% of his lunch. When discussing plan of care patient has poor insight into need for inpatient psychiatric hospitalization.  No attempts to harm self or others and safety of patient maintained.

## 2019-09-25 NOTE — ED ADULT NURSE REASSESSMENT NOTE - GENERAL PATIENT STATE
Patient is calling stating that she is in lots of pain,pleasecall the patient to advise. [phone]  
Patient is calling. She states she is in a lot of pain, she was at the ER yesterday 10/24/17 because of her tibia pain. She states pain pills aren't working. She would like advise from the nurse. Please call patient back.   
Patient picked up: prescription.   Identity was verified: Yes.     
Spoke with patient and informed her medication was refilled one last time. Informed her script is available at [facility] office and she will need photo ID when picking it up. Informed her knee scooter ordered was faxed to [facility] and fax confirmation was received.  
Spoke with patient, she states that she is in severe pain and has been to the ER two times since her surgery. She states that her \"shin has two lumps\" and her leg does not look normal. She states she went to the ER and had an ultrasound which was negative for blood clots. She states she is icing and elevating and does not notice much of a difference in the amount of swelling she has. She states that she had some staples removed in [place] and the rest were removed in the ER at [place]. Informed her that an appointment should be made to see Dr. Ortiz since he has not seen patient since her surgery. Pt in agreement with appointment. Appointment scheduled for 11/1/17 at 10:15am per pt request, offered appointment on 10/30/17 but pt denied. Pt inquiring about knee scooter order, she would like order sent to [facility] as she has obtained DME supplies from them in the past. Order entered into [employer] per Dr. Ortiz's v.o. And faxed to [facility]. Informed pt it is a rental and she should contact [facility] if she does not hear from them in a few days. Patient verbalized understanding.    Pt inquiring about pain medication refill, informed her will discuss with MD/PA and follow up with her. Patient verbalized understanding.    
anxious/irritable
anxious
comfortable appearance/cooperative/resting/sleeping
cooperative/comfortable appearance
resting/sleeping/comfortable appearance

## 2019-09-25 NOTE — ED BEHAVIORAL HEALTH NOTE - BEHAVIORAL HEALTH NOTE
Social work note: Worker spoke to Rosa at admissions at [place]. Pt accepted by MD Erika Reynoso. Pt on 9.37 legals. auth is not needed. Worker arranged transport with [facility] and spoke with Vivek. RN made aware. No other SW needs at this time.

## 2019-10-30 ENCOUNTER — EMERGENCY (EMERGENCY)
Facility: HOSPITAL | Age: 69
LOS: 1 days | Discharge: HOME | End: 2019-10-30
Attending: EMERGENCY MEDICINE | Admitting: EMERGENCY MEDICINE
Payer: MEDICARE

## 2019-10-30 VITALS
SYSTOLIC BLOOD PRESSURE: 78 MMHG | TEMPERATURE: 98 F | HEART RATE: 100 BPM | WEIGHT: 110.01 LBS | DIASTOLIC BLOOD PRESSURE: 49 MMHG | RESPIRATION RATE: 15 BRPM | HEIGHT: 69 IN | OXYGEN SATURATION: 94 %

## 2019-10-30 VITALS
HEART RATE: 61 BPM | OXYGEN SATURATION: 99 % | TEMPERATURE: 98 F | DIASTOLIC BLOOD PRESSURE: 91 MMHG | SYSTOLIC BLOOD PRESSURE: 116 MMHG | RESPIRATION RATE: 17 BRPM

## 2019-10-30 LAB
ALBUMIN SERPL ELPH-MCNC: 3.3 G/DL — SIGNIFICANT CHANGE UP (ref 3.3–5)
ALP SERPL-CCNC: 46 U/L — SIGNIFICANT CHANGE UP (ref 40–120)
ALT FLD-CCNC: 23 U/L — SIGNIFICANT CHANGE UP (ref 12–78)
ANION GAP SERPL CALC-SCNC: 5 MMOL/L — SIGNIFICANT CHANGE UP (ref 5–17)
APAP SERPL-MCNC: <2 UG/ML — LOW (ref 10–30)
APPEARANCE UR: CLEAR — SIGNIFICANT CHANGE UP
APTT BLD: 32.1 SEC — SIGNIFICANT CHANGE UP (ref 28.5–37)
AST SERPL-CCNC: 49 U/L — HIGH (ref 15–37)
BASOPHILS # BLD AUTO: 0 K/UL — SIGNIFICANT CHANGE UP (ref 0–0.2)
BASOPHILS NFR BLD AUTO: 0 % — SIGNIFICANT CHANGE UP (ref 0–2)
BILIRUB SERPL-MCNC: 0.3 MG/DL — SIGNIFICANT CHANGE UP (ref 0.2–1.2)
BILIRUB UR-MCNC: NEGATIVE — SIGNIFICANT CHANGE UP
BUN SERPL-MCNC: 28 MG/DL — HIGH (ref 7–23)
CALCIUM SERPL-MCNC: 8.6 MG/DL — SIGNIFICANT CHANGE UP (ref 8.5–10.1)
CHLORIDE SERPL-SCNC: 110 MMOL/L — HIGH (ref 96–108)
CK MB CFR SERPL CALC: 10 NG/ML — HIGH (ref 0–3.6)
CO2 SERPL-SCNC: 27 MMOL/L — SIGNIFICANT CHANGE UP (ref 22–31)
COLOR SPEC: YELLOW — SIGNIFICANT CHANGE UP
CREAT SERPL-MCNC: 1.1 MG/DL — SIGNIFICANT CHANGE UP (ref 0.5–1.3)
DIFF PNL FLD: NEGATIVE — SIGNIFICANT CHANGE UP
EOSINOPHIL # BLD AUTO: 1.32 K/UL — HIGH (ref 0–0.5)
EOSINOPHIL NFR BLD AUTO: 20 % — HIGH (ref 0–6)
ETHANOL SERPL-MCNC: <10 MG/DL — SIGNIFICANT CHANGE UP (ref 0–10)
GLUCOSE SERPL-MCNC: 80 MG/DL — SIGNIFICANT CHANGE UP (ref 70–99)
GLUCOSE UR QL: NEGATIVE — SIGNIFICANT CHANGE UP
HCT VFR BLD CALC: 38.7 % — LOW (ref 39–50)
HGB BLD-MCNC: 12.7 G/DL — LOW (ref 13–17)
INR BLD: 1.07 RATIO — SIGNIFICANT CHANGE UP (ref 0.88–1.16)
KETONES UR-MCNC: NEGATIVE — SIGNIFICANT CHANGE UP
LACTATE SERPL-SCNC: 1.7 MMOL/L — SIGNIFICANT CHANGE UP (ref 0.7–2)
LEUKOCYTE ESTERASE UR-ACNC: ABNORMAL
LIDOCAIN IGE QN: 128 U/L — SIGNIFICANT CHANGE UP (ref 73–393)
LYMPHOCYTES # BLD AUTO: 0.99 K/UL — LOW (ref 1–3.3)
LYMPHOCYTES # BLD AUTO: 15 % — SIGNIFICANT CHANGE UP (ref 13–44)
MAGNESIUM SERPL-MCNC: 2.2 MG/DL — SIGNIFICANT CHANGE UP (ref 1.6–2.6)
MCHC RBC-ENTMCNC: 28.9 PG — SIGNIFICANT CHANGE UP (ref 27–34)
MCHC RBC-ENTMCNC: 32.8 GM/DL — SIGNIFICANT CHANGE UP (ref 32–36)
MCV RBC AUTO: 88 FL — SIGNIFICANT CHANGE UP (ref 80–100)
MONOCYTES # BLD AUTO: 0.4 K/UL — SIGNIFICANT CHANGE UP (ref 0–0.9)
MONOCYTES NFR BLD AUTO: 6 % — SIGNIFICANT CHANGE UP (ref 2–14)
NEUTROPHILS # BLD AUTO: 3.91 K/UL — SIGNIFICANT CHANGE UP (ref 1.8–7.4)
NEUTROPHILS NFR BLD AUTO: 59 % — SIGNIFICANT CHANGE UP (ref 43–77)
NITRITE UR-MCNC: NEGATIVE — SIGNIFICANT CHANGE UP
NRBC # BLD: SIGNIFICANT CHANGE UP /100 WBCS (ref 0–0)
NT-PROBNP SERPL-SCNC: 46 PG/ML — SIGNIFICANT CHANGE UP (ref 0–125)
PH UR: 6.5 — SIGNIFICANT CHANGE UP (ref 5–8)
PLATELET # BLD AUTO: 228 K/UL — SIGNIFICANT CHANGE UP (ref 150–400)
POTASSIUM SERPL-MCNC: 4.3 MMOL/L — SIGNIFICANT CHANGE UP (ref 3.5–5.3)
POTASSIUM SERPL-SCNC: 4.3 MMOL/L — SIGNIFICANT CHANGE UP (ref 3.5–5.3)
PROT SERPL-MCNC: 6.4 G/DL — SIGNIFICANT CHANGE UP (ref 6–8.3)
PROT UR-MCNC: NEGATIVE — SIGNIFICANT CHANGE UP
PROTHROM AB SERPL-ACNC: 12.2 SEC — SIGNIFICANT CHANGE UP (ref 10–12.9)
RBC # BLD: 4.4 M/UL — SIGNIFICANT CHANGE UP (ref 4.2–5.8)
RBC # FLD: 13.8 % — SIGNIFICANT CHANGE UP (ref 10.3–14.5)
SALICYLATES SERPL-MCNC: <1.7 MG/DL — LOW (ref 2.8–20)
SODIUM SERPL-SCNC: 142 MMOL/L — SIGNIFICANT CHANGE UP (ref 135–145)
SP GR SPEC: 1.01 — SIGNIFICANT CHANGE UP (ref 1.01–1.02)
TROPONIN I SERPL-MCNC: 0.03 NG/ML — SIGNIFICANT CHANGE UP (ref 0.01–0.04)
UROBILINOGEN FLD QL: NEGATIVE — SIGNIFICANT CHANGE UP
WBC # BLD: 6.62 K/UL — SIGNIFICANT CHANGE UP (ref 3.8–10.5)
WBC # FLD AUTO: 6.62 K/UL — SIGNIFICANT CHANGE UP (ref 3.8–10.5)

## 2019-10-30 PROCEDURE — 83880 ASSAY OF NATRIURETIC PEPTIDE: CPT

## 2019-10-30 PROCEDURE — 85610 PROTHROMBIN TIME: CPT

## 2019-10-30 PROCEDURE — 83735 ASSAY OF MAGNESIUM: CPT

## 2019-10-30 PROCEDURE — 71045 X-RAY EXAM CHEST 1 VIEW: CPT | Mod: 26

## 2019-10-30 PROCEDURE — 99285 EMERGENCY DEPT VISIT HI MDM: CPT

## 2019-10-30 PROCEDURE — 96360 HYDRATION IV INFUSION INIT: CPT

## 2019-10-30 PROCEDURE — 93010 ELECTROCARDIOGRAM REPORT: CPT

## 2019-10-30 PROCEDURE — 82553 CREATINE MB FRACTION: CPT

## 2019-10-30 PROCEDURE — 71045 X-RAY EXAM CHEST 1 VIEW: CPT

## 2019-10-30 PROCEDURE — 36415 COLL VENOUS BLD VENIPUNCTURE: CPT

## 2019-10-30 PROCEDURE — 83605 ASSAY OF LACTIC ACID: CPT

## 2019-10-30 PROCEDURE — 81001 URINALYSIS AUTO W/SCOPE: CPT

## 2019-10-30 PROCEDURE — 80053 COMPREHEN METABOLIC PANEL: CPT

## 2019-10-30 PROCEDURE — 70450 CT HEAD/BRAIN W/O DYE: CPT

## 2019-10-30 PROCEDURE — 96361 HYDRATE IV INFUSION ADD-ON: CPT

## 2019-10-30 PROCEDURE — 93005 ELECTROCARDIOGRAM TRACING: CPT

## 2019-10-30 PROCEDURE — 85027 COMPLETE CBC AUTOMATED: CPT

## 2019-10-30 PROCEDURE — 99285 EMERGENCY DEPT VISIT HI MDM: CPT | Mod: 25

## 2019-10-30 PROCEDURE — 85730 THROMBOPLASTIN TIME PARTIAL: CPT

## 2019-10-30 PROCEDURE — 83690 ASSAY OF LIPASE: CPT

## 2019-10-30 PROCEDURE — 84484 ASSAY OF TROPONIN QUANT: CPT

## 2019-10-30 PROCEDURE — 70450 CT HEAD/BRAIN W/O DYE: CPT | Mod: 26

## 2019-10-30 PROCEDURE — 80307 DRUG TEST PRSMV CHEM ANLYZR: CPT

## 2019-10-30 RX ORDER — SODIUM CHLORIDE 9 MG/ML
1000 INJECTION INTRAMUSCULAR; INTRAVENOUS; SUBCUTANEOUS ONCE
Refills: 0 | Status: COMPLETED | OUTPATIENT
Start: 2019-10-30 | End: 2019-10-30

## 2019-10-30 RX ADMIN — SODIUM CHLORIDE 1000 MILLILITER(S): 9 INJECTION INTRAMUSCULAR; INTRAVENOUS; SUBCUTANEOUS at 17:00

## 2019-10-30 RX ADMIN — SODIUM CHLORIDE 1000 MILLILITER(S): 9 INJECTION INTRAMUSCULAR; INTRAVENOUS; SUBCUTANEOUS at 15:07

## 2019-10-30 NOTE — ED PROVIDER NOTE - PLAN OF CARE
68yo M h/o schizophrenia sent from [facility] admission for hypotension. pt unable to add to history. per EMS pt was [facility] and has received haldol and ativan. pt denies any complaints.

## 2019-10-30 NOTE — ED ADULT NURSE NOTE - CHPI ED NUR SYMPTOMS NEG
no nausea/no dizziness/no loss of consciousness/no vomiting/no weakness/no numbness/no change in level of consciousness/no confusion/no fever/no blurred vision

## 2019-10-30 NOTE — ED PROVIDER NOTE - NSFOLLOWUPINSTRUCTIONS_ED_ALL_ED_FT
1) Follow-up with your Primary Medical Doctor. Call today / next business day for prompt follow-up.  2) Return to Emergency room for any worsening or persistent pain, weakness, fever, or any other concerning symptoms.  3) See attached instruction sheets for additional information, including information regarding signs and symptoms to look out for, reasons to seek immediate care and other important instructions.  4) Plenty of fluids

## 2019-10-30 NOTE — ED PROVIDER NOTE - CARE PLAN
Assessment and plan of treatment:	70yo M h/o schizophrenia sent from [facility] for hypotension. pt unable to add to history. per EMS pt was [facility] and has received haldol and ativan. pt denies any complaints. Principal Discharge DX:	Hypotension, unspecified hypotension type  Assessment and plan of treatment:	68yo M h/o schizophrenia sent from [facility] for hypotension. pt unable to add to history. per EMS pt [facility] and has received haldol and ativan. pt denies any complaints.

## 2019-10-30 NOTE — ED PROVIDER NOTE - OBJECTIVE STATEMENT
70yo M h/o schizophrenia sent from [facility] admission for hypotension. pt unable to add to history. per EMS pt was [facility] and has received haldol and ativan. pt denies any complaints.

## 2019-10-30 NOTE — ED ADULT NURSE NOTE - CHIEF COMPLAINT QUOTE
from [facility] admissions to be evaluated for hypotension  hx of schizophrenia.   Was at [facility] prior to [facility] and was given haldol 5mg  and 2 mg  of ativan

## 2019-10-30 NOTE — ED ADULT NURSE NOTE - NSIMPLEMENTINTERV_GEN_ALL_ED
Implemented All Universal Safety Interventions:  [place] to call system. Call bell, personal items and telephone within reach. Instruct patient to call for assistance. Room bathroom lighting operational. Non-slip footwear when patient is off stretcher. Physically safe environment: no spills, clutter or unnecessary equipment. Stretcher in lowest position, wheels locked, appropriate side rails in place.

## 2019-10-30 NOTE — ED PROVIDER NOTE - NS ED ROS FT
GEN: no fever, no chills, no weakness  HENT: no eye pain, no visual changes, no ear pain, no visual or hearing changes, no sore throat, no swelling or neck pain  CV: no chest pain, no palpitations, no dizziness, no swelling  RESP: no coughing, no sob, no IWOB, no MUHAMMAD  GI: no abd pain, no distension, no nausea, no vomiting, no diarrhea, no constipation  : no dysuria,  no frequency, no hematuria, no discharge, no flank pain  MUSCULOSKELETAL: no myalgia, no arthralgia, no joint swelling, no bruising   SKIN: no rash, no wounds, no itching  NEURO: no change in mentation, no visual changes, no HA, no focal weakness, no trouble speaking, no gait abnormalities, no dizziness  PSYCH: no suidical ideation, no homicidal ideation, no depression, no anxiety, no hallucinations

## 2019-10-30 NOTE — ED PROVIDER NOTE - PATIENT PORTAL LINK FT
You can access the FollowMyHealth Patient Portal offered by [facility] by registering at the following website: [email or web address]. By joining [employer]’s FollowMyHealth portal, you will also be able to view your health information using other applications (apps) compatible with our system.

## 2019-10-30 NOTE — ED PROVIDER NOTE - PROGRESS NOTE DETAILS
Pt doing well, no acute co. Janusz Baker MD did not need to speak with me. Pt can be sent back to them for further eval

## 2019-10-30 NOTE — ED ADULT TRIAGE NOTE - CHIEF COMPLAINT QUOTE
from [facility] admissions to be evaluated for hypotension  hx of schizophrenia.   Was at [facility] prior to [facility] and was given haldol 5mg  and 2 mg  of ativan

## 2019-10-30 NOTE — ED ADULT NURSE NOTE - ED STAT RN HANDOFF DETAILS
Spoke w/ MIGUE borja and MD dietrich at [facility]. Pt. given OK to return to facility.

## 2019-10-30 NOTE — ED PROVIDER NOTE - CLINICAL SUMMARY MEDICAL DECISION MAKING FREE TEXT BOX
70yo M h/o schizophrenia sent from [facility] admission for hypotension. pt unable to add to history. per EMS pt was [facility] to [facility] and has received haldol and ativan. pt denies any complaints.

## 2019-10-30 NOTE — ED PROVIDER NOTE - PHYSICAL EXAMINATION
GEN: awake, alert, well appearing, NAD   HENT: atraumatic, normocephalic, LEORA, EOMI, no midline instability, oropharynx w/o erythema or exudates, no lymphadenopathy  CV: bradycardic, hypotensive, S1, S2, no MRG, equal pulses throughout, no JVD  RESP: no distress, no IWOB, no retraction, clear to auscultation bilaterally   ABD: soft, nontender, nondistended, no rebound, no guarding, normoactive bowel sounds, no organomegally  MUSCULOSKELETAL: strenght 5/5 x 4, full range of motion, CMS intact   SKIN: normal color, no turgor, no wounds or rash   NEURO: Awake alert oriented x 3, no facial asymmetry, no slurred speech, no pronator drift, moving all extremities  PSYCH: no suicial ideation, no homicidal ideation, no depression, no anxiety, no hallucination

## 2019-12-03 ENCOUNTER — TRANSCRIPTION ENCOUNTER (OUTPATIENT)
Age: 69
End: 2019-12-03

## 2020-01-03 ENCOUNTER — EMERGENCY (EMERGENCY)
Facility: HOSPITAL | Age: 70
LOS: 0 days | Discharge: HOME | End: 2020-01-04
Attending: EMERGENCY MEDICINE
Payer: MEDICARE

## 2020-01-03 VITALS
DIASTOLIC BLOOD PRESSURE: 85 MMHG | OXYGEN SATURATION: 92 % | TEMPERATURE: 102 F | RESPIRATION RATE: 18 BRPM | HEART RATE: 108 BPM | WEIGHT: 117.95 LBS | HEIGHT: 61 IN | SYSTOLIC BLOOD PRESSURE: 126 MMHG

## 2020-01-03 DIAGNOSIS — R05 COUGH: ICD-10-CM

## 2020-01-03 DIAGNOSIS — B34.9 VIRAL INFECTION, UNSPECIFIED: ICD-10-CM

## 2020-01-03 DIAGNOSIS — F20.9 SCHIZOPHRENIA, UNSPECIFIED: ICD-10-CM

## 2020-01-03 DIAGNOSIS — F03.90 UNSPECIFIED DEMENTIA WITHOUT BEHAVIORAL DISTURBANCE: ICD-10-CM

## 2020-01-03 DIAGNOSIS — R50.9 FEVER, UNSPECIFIED: ICD-10-CM

## 2020-01-03 DIAGNOSIS — K59.00 CONSTIPATION, UNSPECIFIED: ICD-10-CM

## 2020-01-03 LAB
BASOPHILS # BLD AUTO: 0.04 K/UL — SIGNIFICANT CHANGE UP (ref 0–0.2)
BASOPHILS NFR BLD AUTO: 0.2 % — SIGNIFICANT CHANGE UP (ref 0–2)
EOSINOPHIL # BLD AUTO: 0.05 K/UL — SIGNIFICANT CHANGE UP (ref 0–0.5)
EOSINOPHIL NFR BLD AUTO: 0.3 % — SIGNIFICANT CHANGE UP (ref 0–6)
HCT VFR BLD CALC: 39.5 % — SIGNIFICANT CHANGE UP (ref 39–50)
HGB BLD-MCNC: 12.8 G/DL — LOW (ref 13–17)
IMM GRANULOCYTES NFR BLD AUTO: 0.6 % — SIGNIFICANT CHANGE UP (ref 0–1.5)
LYMPHOCYTES # BLD AUTO: 0.82 K/UL — LOW (ref 1–3.3)
LYMPHOCYTES # BLD AUTO: 4.1 % — LOW (ref 13–44)
MCHC RBC-ENTMCNC: 29.2 PG — SIGNIFICANT CHANGE UP (ref 27–34)
MCHC RBC-ENTMCNC: 32.4 GM/DL — SIGNIFICANT CHANGE UP (ref 32–36)
MCV RBC AUTO: 90 FL — SIGNIFICANT CHANGE UP (ref 80–100)
MONOCYTES # BLD AUTO: 1.47 K/UL — HIGH (ref 0–0.9)
MONOCYTES NFR BLD AUTO: 7.4 % — SIGNIFICANT CHANGE UP (ref 2–14)
NEUTROPHILS # BLD AUTO: 17.35 K/UL — HIGH (ref 1.8–7.4)
NEUTROPHILS NFR BLD AUTO: 87.4 % — HIGH (ref 43–77)
PLATELET # BLD AUTO: 329 K/UL — SIGNIFICANT CHANGE UP (ref 150–400)
RBC # BLD: 4.39 M/UL — SIGNIFICANT CHANGE UP (ref 4.2–5.8)
RBC # FLD: 13.7 % — SIGNIFICANT CHANGE UP (ref 10.3–14.5)
WBC # BLD: 19.85 K/UL — HIGH (ref 3.8–10.5)
WBC # FLD AUTO: 19.85 K/UL — HIGH (ref 3.8–10.5)

## 2020-01-03 PROCEDURE — 93010 ELECTROCARDIOGRAM REPORT: CPT

## 2020-01-03 PROCEDURE — 71045 X-RAY EXAM CHEST 1 VIEW: CPT

## 2020-01-03 PROCEDURE — 71045 X-RAY EXAM CHEST 1 VIEW: CPT | Mod: 26

## 2020-01-03 PROCEDURE — 80053 COMPREHEN METABOLIC PANEL: CPT

## 2020-01-03 PROCEDURE — 93005 ELECTROCARDIOGRAM TRACING: CPT

## 2020-01-03 PROCEDURE — 87086 URINE CULTURE/COLONY COUNT: CPT

## 2020-01-03 PROCEDURE — 84484 ASSAY OF TROPONIN QUANT: CPT

## 2020-01-03 PROCEDURE — 96360 HYDRATION IV INFUSION INIT: CPT

## 2020-01-03 PROCEDURE — 85025 COMPLETE CBC W/AUTO DIFF WBC: CPT

## 2020-01-03 PROCEDURE — 85610 PROTHROMBIN TIME: CPT

## 2020-01-03 PROCEDURE — 83605 ASSAY OF LACTIC ACID: CPT

## 2020-01-03 PROCEDURE — 99284 EMERGENCY DEPT VISIT MOD MDM: CPT

## 2020-01-03 PROCEDURE — 96361 HYDRATE IV INFUSION ADD-ON: CPT

## 2020-01-03 PROCEDURE — 85730 THROMBOPLASTIN TIME PARTIAL: CPT

## 2020-01-03 PROCEDURE — 99284 EMERGENCY DEPT VISIT MOD MDM: CPT | Mod: 25

## 2020-01-03 PROCEDURE — 36415 COLL VENOUS BLD VENIPUNCTURE: CPT

## 2020-01-03 PROCEDURE — 87040 BLOOD CULTURE FOR BACTERIA: CPT

## 2020-01-03 PROCEDURE — 81001 URINALYSIS AUTO W/SCOPE: CPT

## 2020-01-03 RX ORDER — ACETAMINOPHEN 500 MG
650 TABLET ORAL ONCE
Refills: 0 | Status: COMPLETED | OUTPATIENT
Start: 2020-01-03 | End: 2020-01-03

## 2020-01-03 RX ORDER — CEFTRIAXONE 500 MG/1
1000 INJECTION, POWDER, FOR SOLUTION INTRAMUSCULAR; INTRAVENOUS ONCE
Refills: 0 | Status: DISCONTINUED | OUTPATIENT
Start: 2020-01-03 | End: 2020-01-03

## 2020-01-03 RX ORDER — SODIUM CHLORIDE 9 MG/ML
2000 INJECTION, SOLUTION INTRAVENOUS ONCE
Refills: 0 | Status: COMPLETED | OUTPATIENT
Start: 2020-01-03 | End: 2020-01-03

## 2020-01-03 RX ORDER — MULTIVIT WITH MIN/MFOLATE/K2 340-15/3 G
1 POWDER (GRAM) ORAL ONCE
Refills: 0 | Status: COMPLETED | OUTPATIENT
Start: 2020-01-03 | End: 2020-01-03

## 2020-01-03 RX ADMIN — SODIUM CHLORIDE 2000 MILLILITER(S): 9 INJECTION, SOLUTION INTRAVENOUS at 23:48

## 2020-01-03 RX ADMIN — Medication 650 MILLIGRAM(S): at 23:48

## 2020-01-03 NOTE — ED PROVIDER NOTE - PATIENT PORTAL LINK FT
You can access the FollowMyHealth Patient Portal offered by [facility] by registering at the following website: [email or web address]. By joining [employer]’s FollowMyHealth portal, you will also be able to view your health information using other applications (apps) compatible with our system.

## 2020-01-03 NOTE — ED PROVIDER NOTE - PROGRESS NOTE DETAILS
non toxic appearing male with constiaption and says he incidentaly dwevloped a fever this armani lives in group home no si no hi denies ingestion of any kind if ,labs nl will d/c with mg citrate do not suspect SBO clincally return to ed for intractable abd pain uncontrolled fever or change in mental status pt agrees to plan of care in nad leukocytosis appreciated clincally pt ion nad no cp no sob abd soft nt neuro itnaact acet ibu for pain return to ed for intractable HA, persistent vomiting, or new onset motor/sensory deficits

## 2020-01-03 NOTE — ED PROVIDER NOTE - OBJECTIVE STATEMENT
pt presents to Ed c/o cough fever and constipation did have stool yesterday and is passing gas. cough no nproductive . + fever that started today . denies HA or neck pain. no chest pain or sob. no abd pain. no n/v/d. no urinary f/u/d. no back pain. no motor or sensory deficits. denies illicit drug use. no recent travel. no rash. no other acute issues symptoms or concerns

## 2020-01-04 PROBLEM — F03.90 UNSPECIFIED DEMENTIA WITHOUT BEHAVIORAL DISTURBANCE: Chronic | Status: ACTIVE | Noted: 2019-10-30

## 2020-01-04 PROBLEM — F20.9 SCHIZOPHRENIA, UNSPECIFIED: Chronic | Status: ACTIVE | Noted: 2019-10-30

## 2020-01-04 LAB
ALBUMIN SERPL ELPH-MCNC: 4 G/DL — SIGNIFICANT CHANGE UP (ref 3.3–5)
ALP SERPL-CCNC: 71 U/L — SIGNIFICANT CHANGE UP (ref 40–120)
ALT FLD-CCNC: 16 U/L — SIGNIFICANT CHANGE UP (ref 12–78)
ANION GAP SERPL CALC-SCNC: 5 MMOL/L — SIGNIFICANT CHANGE UP (ref 5–17)
APPEARANCE UR: CLEAR — SIGNIFICANT CHANGE UP
APTT BLD: 29.6 SEC — SIGNIFICANT CHANGE UP (ref 27.5–36.3)
AST SERPL-CCNC: 16 U/L — SIGNIFICANT CHANGE UP (ref 15–37)
BILIRUB SERPL-MCNC: 0.6 MG/DL — SIGNIFICANT CHANGE UP (ref 0.2–1.2)
BILIRUB UR-MCNC: NEGATIVE — SIGNIFICANT CHANGE UP
BUN SERPL-MCNC: 22 MG/DL — SIGNIFICANT CHANGE UP (ref 7–23)
CALCIUM SERPL-MCNC: 10.1 MG/DL — SIGNIFICANT CHANGE UP (ref 8.5–10.1)
CHLORIDE SERPL-SCNC: 101 MMOL/L — SIGNIFICANT CHANGE UP (ref 96–108)
CO2 SERPL-SCNC: 31 MMOL/L — SIGNIFICANT CHANGE UP (ref 22–31)
COLOR SPEC: YELLOW — SIGNIFICANT CHANGE UP
CREAT SERPL-MCNC: 1.4 MG/DL — HIGH (ref 0.5–1.3)
DIFF PNL FLD: ABNORMAL
GLUCOSE SERPL-MCNC: 103 MG/DL — HIGH (ref 70–99)
GLUCOSE UR QL: NEGATIVE MG/DL — SIGNIFICANT CHANGE UP
INR BLD: 1.17 RATIO — HIGH (ref 0.88–1.16)
KETONES UR-MCNC: NEGATIVE — SIGNIFICANT CHANGE UP
LACTATE SERPL-SCNC: 1.3 MMOL/L — SIGNIFICANT CHANGE UP (ref 0.7–2)
LEUKOCYTE ESTERASE UR-ACNC: NEGATIVE — SIGNIFICANT CHANGE UP
NITRITE UR-MCNC: NEGATIVE — SIGNIFICANT CHANGE UP
PH UR: 7 — SIGNIFICANT CHANGE UP (ref 5–8)
POTASSIUM SERPL-MCNC: 3 MMOL/L — LOW (ref 3.5–5.3)
POTASSIUM SERPL-SCNC: 3 MMOL/L — LOW (ref 3.5–5.3)
PROT SERPL-MCNC: 8.1 GM/DL — SIGNIFICANT CHANGE UP (ref 6–8.3)
PROT UR-MCNC: NEGATIVE MG/DL — SIGNIFICANT CHANGE UP
PROTHROM AB SERPL-ACNC: 13 SEC — HIGH (ref 10–12.9)
SODIUM SERPL-SCNC: 137 MMOL/L — SIGNIFICANT CHANGE UP (ref 135–145)
SP GR SPEC: 1 — LOW (ref 1.01–1.02)
TROPONIN I SERPL-MCNC: 0.03 NG/ML — SIGNIFICANT CHANGE UP (ref 0.01–0.04)
UROBILINOGEN FLD QL: NEGATIVE MG/DL — SIGNIFICANT CHANGE UP

## 2020-01-04 RX ADMIN — Medication 1 BOTTLE: at 00:20

## 2020-01-04 RX ADMIN — Medication 650 MILLIGRAM(S): at 01:37

## 2020-01-04 RX ADMIN — SODIUM CHLORIDE 2000 MILLILITER(S): 9 INJECTION, SOLUTION INTRAVENOUS at 01:37

## 2020-01-05 LAB
CULTURE RESULTS: NO GROWTH — SIGNIFICANT CHANGE UP
SPECIMEN SOURCE: SIGNIFICANT CHANGE UP

## 2020-01-09 LAB
CULTURE RESULTS: SIGNIFICANT CHANGE UP
SPECIMEN SOURCE: SIGNIFICANT CHANGE UP

## 2020-01-29 ENCOUNTER — EMERGENCY (EMERGENCY)
Facility: HOSPITAL | Age: 70
LOS: 0 days | Discharge: HOME | End: 2020-01-29
Attending: EMERGENCY MEDICINE
Payer: MEDICARE

## 2020-01-29 VITALS
DIASTOLIC BLOOD PRESSURE: 76 MMHG | SYSTOLIC BLOOD PRESSURE: 130 MMHG | HEART RATE: 89 BPM | RESPIRATION RATE: 18 BRPM | TEMPERATURE: 98 F | OXYGEN SATURATION: 99 %

## 2020-01-29 VITALS — HEIGHT: 61 IN | WEIGHT: 119.93 LBS

## 2020-01-29 DIAGNOSIS — R41.82 ALTERED MENTAL STATUS, UNSPECIFIED: ICD-10-CM

## 2020-01-29 DIAGNOSIS — Z79.899 OTHER LONG TERM (CURRENT) DRUG THERAPY: ICD-10-CM

## 2020-01-29 DIAGNOSIS — F03.90 UNSPECIFIED DEMENTIA WITHOUT BEHAVIORAL DISTURBANCE: ICD-10-CM

## 2020-01-29 DIAGNOSIS — F20.9 SCHIZOPHRENIA, UNSPECIFIED: ICD-10-CM

## 2020-01-29 DIAGNOSIS — E86.0 DEHYDRATION: ICD-10-CM

## 2020-01-29 LAB
ALBUMIN SERPL ELPH-MCNC: 4.2 G/DL — SIGNIFICANT CHANGE UP (ref 3.3–5)
ALP SERPL-CCNC: 73 U/L — SIGNIFICANT CHANGE UP (ref 40–120)
ALT FLD-CCNC: 34 U/L — SIGNIFICANT CHANGE UP (ref 12–78)
ANION GAP SERPL CALC-SCNC: 7 MMOL/L — SIGNIFICANT CHANGE UP (ref 5–17)
APAP SERPL-MCNC: < 2 UG/ML (ref 10–30)
APPEARANCE UR: CLEAR — SIGNIFICANT CHANGE UP
APTT BLD: 29.6 SEC — SIGNIFICANT CHANGE UP (ref 27.5–36.3)
AST SERPL-CCNC: 24 U/L — SIGNIFICANT CHANGE UP (ref 15–37)
BASOPHILS # BLD AUTO: 0.07 K/UL — SIGNIFICANT CHANGE UP (ref 0–0.2)
BASOPHILS NFR BLD AUTO: 0.6 % — SIGNIFICANT CHANGE UP (ref 0–2)
BILIRUB SERPL-MCNC: 0.7 MG/DL — SIGNIFICANT CHANGE UP (ref 0.2–1.2)
BILIRUB UR-MCNC: NEGATIVE — SIGNIFICANT CHANGE UP
BUN SERPL-MCNC: 31 MG/DL — HIGH (ref 7–23)
CALCIUM SERPL-MCNC: 9.8 MG/DL — SIGNIFICANT CHANGE UP (ref 8.5–10.1)
CHLORIDE SERPL-SCNC: 105 MMOL/L — SIGNIFICANT CHANGE UP (ref 96–108)
CO2 SERPL-SCNC: 29 MMOL/L — SIGNIFICANT CHANGE UP (ref 22–31)
COLOR SPEC: YELLOW — SIGNIFICANT CHANGE UP
CREAT SERPL-MCNC: 2.42 MG/DL — HIGH (ref 0.5–1.3)
DIFF PNL FLD: ABNORMAL
EOSINOPHIL # BLD AUTO: 0.06 K/UL — SIGNIFICANT CHANGE UP (ref 0–0.5)
EOSINOPHIL NFR BLD AUTO: 0.6 % — SIGNIFICANT CHANGE UP (ref 0–6)
ETHANOL SERPL-MCNC: <10 MG/DL — SIGNIFICANT CHANGE UP (ref 0–10)
GLUCOSE SERPL-MCNC: 151 MG/DL — HIGH (ref 70–99)
GLUCOSE UR QL: NEGATIVE MG/DL — SIGNIFICANT CHANGE UP
HCT VFR BLD CALC: 43.2 % — SIGNIFICANT CHANGE UP (ref 39–50)
HGB BLD-MCNC: 14.1 G/DL — SIGNIFICANT CHANGE UP (ref 13–17)
IMM GRANULOCYTES NFR BLD AUTO: 0.3 % — SIGNIFICANT CHANGE UP (ref 0–1.5)
INR BLD: 1.12 RATIO — SIGNIFICANT CHANGE UP (ref 0.88–1.16)
KETONES UR-MCNC: NEGATIVE — SIGNIFICANT CHANGE UP
LEUKOCYTE ESTERASE UR-ACNC: NEGATIVE — SIGNIFICANT CHANGE UP
LYMPHOCYTES # BLD AUTO: 1.49 K/UL — SIGNIFICANT CHANGE UP (ref 1–3.3)
LYMPHOCYTES # BLD AUTO: 13.8 % — SIGNIFICANT CHANGE UP (ref 13–44)
MCHC RBC-ENTMCNC: 28.4 PG — SIGNIFICANT CHANGE UP (ref 27–34)
MCHC RBC-ENTMCNC: 32.6 GM/DL — SIGNIFICANT CHANGE UP (ref 32–36)
MCV RBC AUTO: 87.1 FL — SIGNIFICANT CHANGE UP (ref 80–100)
MONOCYTES # BLD AUTO: 0.57 K/UL — SIGNIFICANT CHANGE UP (ref 0–0.9)
MONOCYTES NFR BLD AUTO: 5.3 % — SIGNIFICANT CHANGE UP (ref 2–14)
NEUTROPHILS # BLD AUTO: 8.56 K/UL — HIGH (ref 1.8–7.4)
NEUTROPHILS NFR BLD AUTO: 79.4 % — HIGH (ref 43–77)
NITRITE UR-MCNC: NEGATIVE — SIGNIFICANT CHANGE UP
PCP SPEC-MCNC: SIGNIFICANT CHANGE UP
PH UR: 7 — SIGNIFICANT CHANGE UP (ref 5–8)
PLATELET # BLD AUTO: 416 K/UL — HIGH (ref 150–400)
POTASSIUM SERPL-MCNC: 3.2 MMOL/L — LOW (ref 3.5–5.3)
POTASSIUM SERPL-SCNC: 3.2 MMOL/L — LOW (ref 3.5–5.3)
PROT SERPL-MCNC: 8.1 GM/DL — SIGNIFICANT CHANGE UP (ref 6–8.3)
PROT UR-MCNC: 30 MG/DL
PROTHROM AB SERPL-ACNC: 12.5 SEC — SIGNIFICANT CHANGE UP (ref 10–12.9)
RBC # BLD: 4.96 M/UL — SIGNIFICANT CHANGE UP (ref 4.2–5.8)
RBC # FLD: 13.2 % — SIGNIFICANT CHANGE UP (ref 10.3–14.5)
SODIUM SERPL-SCNC: 141 MMOL/L — SIGNIFICANT CHANGE UP (ref 135–145)
SP GR SPEC: 1.01 — SIGNIFICANT CHANGE UP (ref 1.01–1.02)
UROBILINOGEN FLD QL: NEGATIVE MG/DL — SIGNIFICANT CHANGE UP
WBC # BLD: 10.78 K/UL — HIGH (ref 3.8–10.5)
WBC # FLD AUTO: 10.78 K/UL — HIGH (ref 3.8–10.5)

## 2020-01-29 PROCEDURE — 70450 CT HEAD/BRAIN W/O DYE: CPT | Mod: 26

## 2020-01-29 PROCEDURE — 80307 DRUG TEST PRSMV CHEM ANLYZR: CPT

## 2020-01-29 PROCEDURE — 85025 COMPLETE CBC W/AUTO DIFF WBC: CPT

## 2020-01-29 PROCEDURE — 80053 COMPREHEN METABOLIC PANEL: CPT

## 2020-01-29 PROCEDURE — 87086 URINE CULTURE/COLONY COUNT: CPT

## 2020-01-29 PROCEDURE — 36415 COLL VENOUS BLD VENIPUNCTURE: CPT

## 2020-01-29 PROCEDURE — 85610 PROTHROMBIN TIME: CPT

## 2020-01-29 PROCEDURE — 93005 ELECTROCARDIOGRAM TRACING: CPT

## 2020-01-29 PROCEDURE — 81001 URINALYSIS AUTO W/SCOPE: CPT

## 2020-01-29 PROCEDURE — 93010 ELECTROCARDIOGRAM REPORT: CPT

## 2020-01-29 PROCEDURE — 71045 X-RAY EXAM CHEST 1 VIEW: CPT | Mod: 26

## 2020-01-29 PROCEDURE — 70450 CT HEAD/BRAIN W/O DYE: CPT

## 2020-01-29 PROCEDURE — 85730 THROMBOPLASTIN TIME PARTIAL: CPT

## 2020-01-29 PROCEDURE — 99284 EMERGENCY DEPT VISIT MOD MDM: CPT

## 2020-01-29 PROCEDURE — 71045 X-RAY EXAM CHEST 1 VIEW: CPT

## 2020-01-29 PROCEDURE — 99285 EMERGENCY DEPT VISIT HI MDM: CPT | Mod: 25

## 2020-01-29 RX ORDER — SODIUM CHLORIDE 9 MG/ML
2000 INJECTION INTRAMUSCULAR; INTRAVENOUS; SUBCUTANEOUS ONCE
Refills: 0 | Status: COMPLETED | OUTPATIENT
Start: 2020-01-29 | End: 2020-01-29

## 2020-01-29 RX ADMIN — SODIUM CHLORIDE 2000 MILLILITER(S): 9 INJECTION INTRAMUSCULAR; INTRAVENOUS; SUBCUTANEOUS at 16:39

## 2020-01-29 NOTE — ED STATDOCS - PROGRESS NOTE DETAILS
Bakari GONZALEZ for ED attending, Dr. Kaiser: 68 y/o M with a PMHx of dementia, schizophrenia, drug abuse presents to the ED with son sent from [facility] for evaluation of AMS. Pt was d/c'd on a monthly shot of Haldol from [facility] in November for psychosis and beatrice. First shot was 11/2019. Per son, pt has been having difficulty urinating. Son reports that pt was c/o fatigue, weakness, and constipation. Will send pt to main ED for further evaluation.

## 2020-01-29 NOTE — ED PROVIDER NOTE - SKIN, MLM
Skin normal color for race, warm, dry and intact. No evidence of rash. No obvious signs of trauma or laceration.

## 2020-01-29 NOTE — ED ADULT TRIAGE NOTE - CHIEF COMPLAINT QUOTE
[facility] was recently d/c'd from [facility] for psychosis and beatrice , d/c'd on haldol , son reports weight loss, rigidity , unable to perform ADL'S, tachycardia , tremors .

## 2020-01-29 NOTE — ED PROVIDER NOTE - OBJECTIVE STATEMENT
68 y/o male with a PMHx of alcohol abuse, dementia, drug abuse, schizophrenia, presents to the ED with son sent from [facility] for evaluation of AMS. +weakness. Pt was seen in ED 3 weeks ago for constipation. No other complaints at this time.

## 2020-01-29 NOTE — ED ADULT NURSE NOTE - OBJECTIVE STATEMENT
Pt is a 69y male, A & O x 2, VSS, presents to ED w/ son who states pt has "not been acting like himself" x 3 weeks, + decreased appetite, weakness and confusion. Son states pt was recently at [facility] for "mental health reasons" unknown if pt is compliant with his medications, does not have a PCP. Pt has no complaints. Bed rails up, call bell within reach.

## 2020-01-29 NOTE — ED PROVIDER NOTE - PATIENT PORTAL LINK FT
You can access the FollowMyHealth Patient Portal offered by [facility] by registering at the following website: [email or web address]. By joining [employer]’s FollowMyHealth portal, you will also be able to view your health information using other applications (apps) compatible with our system.

## 2020-01-29 NOTE — ED ADULT NURSE NOTE - CHIEF COMPLAINT QUOTE
[facility] was recently d/c'd from [facility] for psychosis and beatrice , d/c'd on haldol , son reports weight loss, rigidity , unable to perform ADL'S, tachycardia , tremors .

## 2020-01-29 NOTE — ED PROVIDER NOTE - CONSTITUTIONAL, MLM
normal... Thin male, frail appearing, awake, alert, oriented to person, place, time/situation, in no apparent distress and not c/o any pain.

## 2020-01-29 NOTE — ED PROVIDER NOTE - NS_ ATTENDINGSCRIBEDETAILS _ED_A_ED_FT
I, Vicente Cole MD,  performed the initial face to face bedside interview with this patient regarding history of present illness, review of symptoms and relevant past medical, social and family history.  I completed an independent physical examination.    The history, relevant review of systems, past medical and surgical history, medical decision making, and physical examination was documented by the scribe in my presence and I attest to the accuracy of the documentation.

## 2020-01-30 LAB
CULTURE RESULTS: SIGNIFICANT CHANGE UP
SPECIMEN SOURCE: SIGNIFICANT CHANGE UP

## 2020-03-08 ENCOUNTER — TRANSCRIPTION ENCOUNTER (OUTPATIENT)
Age: 70
End: 2020-03-08

## 2020-09-16 NOTE — ED BEHAVIORAL HEALTH ASSESSMENT NOTE - OTHER
Kenzie Salas PT, DPT Attempted to reach son regarding discharge status and recommendation. AMS rents room family and neurologist defer somewhat pressured at times mumbling at times "fine" concrete or rambling does not appear to be RIS

## 2020-12-15 NOTE — ED BEHAVIORAL HEALTH ASSESSMENT NOTE - ATTENTION / CONCENTRATION
Kiley Kellogg is a 45year old female  Chief Complaint: consult Systemic lupus erythematosus    Joints affected: back, shoulders, and legs  Pain level: 8/10  Area's of Joint stiffness: legs    Denies known Latex allergy or symptoms of Latex sensitivity. Medications verified, updated and reviewed. Patient taking NSAIDS: no  if Yes then which Nsaids are being taken: none. Patient taking Prednisone:  no   How Much is Patient taking if yes: none. Allergies verified, updates and reviewed. Tobacco history verified 12/15/2020. Pharmacy is verified. PCP verified or updated.    Dhruv Duff MD Impaired

## 2021-09-08 NOTE — PATIENT PROFILE ADULT - NSTRANSFERBELONGINGSRESP_GEN_A_NUR
yes Mauc Instructions: By selecting yes to the question below the MAUC number will be added into the note.  This will be calculated automatically based on the diagnosis chosen, the size entered, the body zone selected (H,M,L) and the specific indications you chose. You will also have the option to override the Mohs AUC if you disagree with the automatically calculated number and this option is found in the Case Summary tab.

## 2021-12-07 NOTE — ED PROVIDER NOTE - NS_EDPROVIDERDISPOUSERTYPE_ED_A_ED
Intro Statement (Will Not Render If Left Blank): The patient is undergoing superficial radiation therapy for skin cancer and presents for weekly evaluation and management.  Per protocol and as documented on the flow sheet, the patient was questioned as to subjective redness, pruritus, pain, drainage, fatigue, or any other symptoms.  Objectively, the radiation area was evaluated with regards to erythema, atrophy, scale, crusting, erosion, ulceration, edema, purpura, tenderness, warmth, drainage, and any other findings.  The plan was extensively reviewed including the dose, and dosing schedule.  The simulation and clinical setup was also reviewed as was the external and any internal shields and based on this review the appropriateness and sufficiency of treatment was determined. Attending Attestation (For Attendings USE Only)...

## 2022-01-25 NOTE — ED PROVIDER NOTE - CROS ED EYES ALL NEG
----- Message from Holly Reis sent at 1/25/2022  1:44 PM CST -----  Contact: Regine/Daughter  Regine called regarding the insurance do not cover the albuterol-ipratropium (DUO-NEB) 2.5 mg-0.5 mg/3 mL nebulizer solution and she would like to speak with someone, please give her a call back at  [phone]      Thanks  kb     negative...

## 2022-09-17 ENCOUNTER — INPATIENT (INPATIENT)
Facility: HOSPITAL | Age: 72
LOS: 2 days | Discharge: HOME HEALTH | DRG: 557 | End: 2022-09-20
Attending: HOSPITALIST | Admitting: INTERNAL MEDICINE
Payer: MEDICARE

## 2022-09-17 VITALS
HEIGHT: 61 IN | HEART RATE: 108 BPM | TEMPERATURE: 101 F | DIASTOLIC BLOOD PRESSURE: 77 MMHG | WEIGHT: 139.99 LBS | RESPIRATION RATE: 18 BRPM | OXYGEN SATURATION: 95 % | SYSTOLIC BLOOD PRESSURE: 92 MMHG

## 2022-09-17 DIAGNOSIS — R50.9 FEVER, UNSPECIFIED: ICD-10-CM

## 2022-09-17 LAB
ALBUMIN SERPL ELPH-MCNC: 3.4 G/DL — SIGNIFICANT CHANGE UP (ref 3.3–5)
ALP SERPL-CCNC: 74 U/L — SIGNIFICANT CHANGE UP (ref 40–120)
ALT FLD-CCNC: 30 U/L — SIGNIFICANT CHANGE UP (ref 12–78)
ANION GAP SERPL CALC-SCNC: 7 MMOL/L — SIGNIFICANT CHANGE UP (ref 5–17)
APPEARANCE UR: CLEAR — SIGNIFICANT CHANGE UP
APTT BLD: 31.6 SEC — SIGNIFICANT CHANGE UP (ref 27.5–35.5)
AST SERPL-CCNC: 52 U/L — HIGH (ref 15–37)
BASOPHILS # BLD AUTO: 0.04 K/UL — SIGNIFICANT CHANGE UP (ref 0–0.2)
BASOPHILS NFR BLD AUTO: 0.4 % — SIGNIFICANT CHANGE UP (ref 0–2)
BILIRUB SERPL-MCNC: 0.7 MG/DL — SIGNIFICANT CHANGE UP (ref 0.2–1.2)
BILIRUB UR-MCNC: NEGATIVE — SIGNIFICANT CHANGE UP
BUN SERPL-MCNC: 30 MG/DL — HIGH (ref 7–23)
CALCIUM SERPL-MCNC: 9.2 MG/DL — SIGNIFICANT CHANGE UP (ref 8.5–10.1)
CHLORIDE SERPL-SCNC: 96 MMOL/L — SIGNIFICANT CHANGE UP (ref 96–108)
CO2 SERPL-SCNC: 32 MMOL/L — HIGH (ref 22–31)
COLOR SPEC: YELLOW — SIGNIFICANT CHANGE UP
CREAT SERPL-MCNC: 1.8 MG/DL — HIGH (ref 0.5–1.3)
DIFF PNL FLD: ABNORMAL
EGFR: 39 ML/MIN/1.73M2 — LOW
EOSINOPHIL # BLD AUTO: 0.03 K/UL — SIGNIFICANT CHANGE UP (ref 0–0.5)
EOSINOPHIL NFR BLD AUTO: 0.3 % — SIGNIFICANT CHANGE UP (ref 0–6)
GLUCOSE SERPL-MCNC: 115 MG/DL — HIGH (ref 70–99)
GLUCOSE UR QL: NEGATIVE — SIGNIFICANT CHANGE UP
HCT VFR BLD CALC: 36.5 % — LOW (ref 39–50)
HGB BLD-MCNC: 12.5 G/DL — LOW (ref 13–17)
IMM GRANULOCYTES NFR BLD AUTO: 0.5 % — SIGNIFICANT CHANGE UP (ref 0–0.9)
INR BLD: 1.52 RATIO — HIGH (ref 0.88–1.16)
KETONES UR-MCNC: NEGATIVE — SIGNIFICANT CHANGE UP
LACTATE SERPL-SCNC: 2.3 MMOL/L — HIGH (ref 0.7–2)
LEUKOCYTE ESTERASE UR-ACNC: NEGATIVE — SIGNIFICANT CHANGE UP
LYMPHOCYTES # BLD AUTO: 0.33 K/UL — LOW (ref 1–3.3)
LYMPHOCYTES # BLD AUTO: 3 % — LOW (ref 13–44)
MCHC RBC-ENTMCNC: 28.4 PG — SIGNIFICANT CHANGE UP (ref 27–34)
MCHC RBC-ENTMCNC: 34.2 GM/DL — SIGNIFICANT CHANGE UP (ref 32–36)
MCV RBC AUTO: 83 FL — SIGNIFICANT CHANGE UP (ref 80–100)
MONOCYTES # BLD AUTO: 0.52 K/UL — SIGNIFICANT CHANGE UP (ref 0–0.9)
MONOCYTES NFR BLD AUTO: 4.7 % — SIGNIFICANT CHANGE UP (ref 2–14)
NEUTROPHILS # BLD AUTO: 10 K/UL — HIGH (ref 1.8–7.4)
NEUTROPHILS NFR BLD AUTO: 91.1 % — HIGH (ref 43–77)
NITRITE UR-MCNC: NEGATIVE — SIGNIFICANT CHANGE UP
PH UR: 8 — SIGNIFICANT CHANGE UP (ref 5–8)
PLATELET # BLD AUTO: 178 K/UL — SIGNIFICANT CHANGE UP (ref 150–400)
POTASSIUM SERPL-MCNC: 2.3 MMOL/L — CRITICAL LOW (ref 3.5–5.3)
POTASSIUM SERPL-SCNC: 2.3 MMOL/L — CRITICAL LOW (ref 3.5–5.3)
PROT SERPL-MCNC: 7.1 GM/DL — SIGNIFICANT CHANGE UP (ref 6–8.3)
PROT UR-MCNC: 30 MG/DL
PROTHROM AB SERPL-ACNC: 17.7 SEC — HIGH (ref 10.5–13.4)
RAPID RVP RESULT: SIGNIFICANT CHANGE UP
RBC # BLD: 4.4 M/UL — SIGNIFICANT CHANGE UP (ref 4.2–5.8)
RBC # FLD: 14.6 % — HIGH (ref 10.3–14.5)
SARS-COV-2 RNA SPEC QL NAA+PROBE: SIGNIFICANT CHANGE UP
SODIUM SERPL-SCNC: 135 MMOL/L — SIGNIFICANT CHANGE UP (ref 135–145)
SP GR SPEC: 1.01 — SIGNIFICANT CHANGE UP (ref 1.01–1.02)
TROPONIN I, HIGH SENSITIVITY RESULT: 76.05 NG/L — SIGNIFICANT CHANGE UP
UROBILINOGEN FLD QL: NEGATIVE — SIGNIFICANT CHANGE UP
WBC # BLD: 10.98 K/UL — HIGH (ref 3.8–10.5)
WBC # FLD AUTO: 10.98 K/UL — HIGH (ref 3.8–10.5)

## 2022-09-17 PROCEDURE — 86140 C-REACTIVE PROTEIN: CPT

## 2022-09-17 PROCEDURE — 85652 RBC SED RATE AUTOMATED: CPT

## 2022-09-17 PROCEDURE — 97116 GAIT TRAINING THERAPY: CPT | Mod: GP

## 2022-09-17 PROCEDURE — 74176 CT ABD & PELVIS W/O CONTRAST: CPT | Mod: 26,MD

## 2022-09-17 PROCEDURE — 84443 ASSAY THYROID STIM HORMONE: CPT

## 2022-09-17 PROCEDURE — 70450 CT HEAD/BRAIN W/O DYE: CPT | Mod: 26,MD

## 2022-09-17 PROCEDURE — 97162 PT EVAL MOD COMPLEX 30 MIN: CPT | Mod: GP

## 2022-09-17 PROCEDURE — 83735 ASSAY OF MAGNESIUM: CPT

## 2022-09-17 PROCEDURE — 99223 1ST HOSP IP/OBS HIGH 75: CPT

## 2022-09-17 PROCEDURE — 71250 CT THORAX DX C-: CPT | Mod: 26,MD

## 2022-09-17 PROCEDURE — 99285 EMERGENCY DEPT VISIT HI MDM: CPT | Mod: CS

## 2022-09-17 PROCEDURE — 85610 PROTHROMBIN TIME: CPT

## 2022-09-17 PROCEDURE — 84100 ASSAY OF PHOSPHORUS: CPT

## 2022-09-17 PROCEDURE — 84484 ASSAY OF TROPONIN QUANT: CPT

## 2022-09-17 PROCEDURE — 93010 ELECTROCARDIOGRAM REPORT: CPT

## 2022-09-17 PROCEDURE — 36415 COLL VENOUS BLD VENIPUNCTURE: CPT

## 2022-09-17 PROCEDURE — 80053 COMPREHEN METABOLIC PANEL: CPT

## 2022-09-17 PROCEDURE — 93005 ELECTROCARDIOGRAM TRACING: CPT

## 2022-09-17 PROCEDURE — 83036 HEMOGLOBIN GLYCOSYLATED A1C: CPT

## 2022-09-17 PROCEDURE — 93306 TTE W/DOPPLER COMPLETE: CPT

## 2022-09-17 PROCEDURE — 84439 ASSAY OF FREE THYROXINE: CPT

## 2022-09-17 PROCEDURE — 85027 COMPLETE CBC AUTOMATED: CPT

## 2022-09-17 PROCEDURE — 80061 LIPID PANEL: CPT

## 2022-09-17 PROCEDURE — 85730 THROMBOPLASTIN TIME PARTIAL: CPT

## 2022-09-17 PROCEDURE — 85025 COMPLETE CBC W/AUTO DIFF WBC: CPT

## 2022-09-17 PROCEDURE — 82550 ASSAY OF CK (CPK): CPT

## 2022-09-17 PROCEDURE — 73521 X-RAY EXAM HIPS BI 2 VIEWS: CPT | Mod: 26

## 2022-09-17 PROCEDURE — 97530 THERAPEUTIC ACTIVITIES: CPT | Mod: GP

## 2022-09-17 PROCEDURE — 72125 CT NECK SPINE W/O DYE: CPT | Mod: 26,MD

## 2022-09-17 RX ORDER — VANCOMYCIN HCL 1 G
1000 VIAL (EA) INTRAVENOUS ONCE
Refills: 0 | Status: COMPLETED | OUTPATIENT
Start: 2022-09-17 | End: 2022-09-17

## 2022-09-17 RX ORDER — POTASSIUM CHLORIDE 20 MEQ
10 PACKET (EA) ORAL
Refills: 0 | Status: COMPLETED | OUTPATIENT
Start: 2022-09-17 | End: 2022-09-17

## 2022-09-17 RX ORDER — POTASSIUM CHLORIDE 20 MEQ
40 PACKET (EA) ORAL ONCE
Refills: 0 | Status: COMPLETED | OUTPATIENT
Start: 2022-09-17 | End: 2022-09-17

## 2022-09-17 RX ORDER — PIPERACILLIN AND TAZOBACTAM 4; .5 G/20ML; G/20ML
3.38 INJECTION, POWDER, LYOPHILIZED, FOR SOLUTION INTRAVENOUS ONCE
Refills: 0 | Status: COMPLETED | OUTPATIENT
Start: 2022-09-17 | End: 2022-09-17

## 2022-09-17 RX ORDER — SODIUM CHLORIDE 9 MG/ML
1600 INJECTION, SOLUTION INTRAVENOUS ONCE
Refills: 0 | Status: COMPLETED | OUTPATIENT
Start: 2022-09-17 | End: 2022-09-17

## 2022-09-17 RX ADMIN — SODIUM CHLORIDE 1600 MILLILITER(S): 9 INJECTION, SOLUTION INTRAVENOUS at 17:16

## 2022-09-17 RX ADMIN — PIPERACILLIN AND TAZOBACTAM 200 GRAM(S): 4; .5 INJECTION, POWDER, LYOPHILIZED, FOR SOLUTION INTRAVENOUS at 17:16

## 2022-09-17 RX ADMIN — Medication 100 MILLIEQUIVALENT(S): at 22:14

## 2022-09-17 RX ADMIN — Medication 40 MILLIEQUIVALENT(S): at 19:55

## 2022-09-17 RX ADMIN — Medication 100 MILLIEQUIVALENT(S): at 19:57

## 2022-09-17 RX ADMIN — Medication 100 MILLIEQUIVALENT(S): at 21:04

## 2022-09-17 NOTE — ED ADULT NURSE NOTE - OBJECTIVE STATEMENT
Pt presents to ER c/o fever/ chills and weakness. Onset of symptoms began yesterday after he got out of the shower. Pt reports he fell yesterday onto right hip r/t weakness. Denies cough/SOB. AO x 3

## 2022-09-17 NOTE — ED PROVIDER NOTE - PROGRESS NOTE DETAILS
Girma GUERRA: Spoke with Dr. Chau Zuniga. Patient with fever, lactic acidosis, chills, rigors, no source known in the ED, however high risk for bacteremia, FUO. Girma GUERRA: Spoke with Dr. Chau Zuniga. Patient with fever, lactic acidosis, chills, rigors, no source known in the ED, however high risk for bacteremia, FUO. Abnormal labs, complex past history, not safe for dc. Medical admission is appreciated.

## 2022-09-17 NOTE — ED ADULT TRIAGE NOTE - CHIEF COMPLAINT QUOTE
BIBA to ED from home with c/o fever, chills, and weakness yesterday. PT reports falling yesterday due to weakness. C/O right hip pain s/p fall yesterday. PT took Advil 4hrs PTA to ED.

## 2022-09-17 NOTE — ED ADULT NURSE REASSESSMENT NOTE - NS ED NURSE REASSESS COMMENT FT1
Report received from previous RN. Pt denies any complaints at present. Pt resting comfortably in stretcher in lowest position at present. Medications administered as ordered.

## 2022-09-17 NOTE — ED PROVIDER NOTE - CLINICAL SUMMARY MEDICAL DECISION MAKING FREE TEXT BOX
Plan: CT head, labs. Pt noted with elevated lactate and fever. Will evaluate to r/o bacteria source and sepsis.

## 2022-09-17 NOTE — ED PROVIDER NOTE - OBJECTIVE STATEMENT
71 yo male w/PMHx of alcohol abuse, dementia, drug abuse, schizophrenia presents to the ED c/o fever, chills, rigors x2 days. Pt notes that he is feeling weak and fatigue. Pt fell today and was unable to get up and ambulate. Pt is here with his son, Morgan: [phone]. 71 yo male w/ PMHx of alcohol dependence /abuse?, dementia, drug abuse, schizophrenia presents to the ED c/o fever, chills, rigors x2 days. Pt notes that he is feeling weak and fatigue. Pt fell today and was unable to get up and ambulate. Pt is here with his son, Mr. Mora: [phone]. No neck stiffness. No skin rash. No melena. No recent trauma. No visual or focal neurological complaints. No saddle anesthesia. No weakness in arms or legs. No seizures. No syncope. No cp.

## 2022-09-17 NOTE — ED PROVIDER NOTE - CONSTITUTIONAL, MLM
Well appearing, awake, alert, oriented to person, place, time/situation and in no apparent distress. normal... Well appearing, awake, alert, oriented to person, place, time/situation and in no apparent distress. Nontoxic appearing.

## 2022-09-17 NOTE — ED PROVIDER NOTE - CARE PLAN
1 Principal Discharge DX:	Fever  Goal:	FUO, eval for bacteremia, source of fever  Secondary Diagnosis:	Rigors  Secondary Diagnosis:	High serum lactate

## 2022-09-17 NOTE — ED PROVIDER NOTE - NS_ ATTENDINGSCRIBEDETAILS _ED_A_ED_FT
I Dell Rayo MD saw and examined the patient. Scribe documented for me and under my supervision. I have modified the scribe's documentation where necessary to reflect my history, physical exam and other relevant documentations pertinent to the care of the patient.

## 2022-09-17 NOTE — ED PROVIDER NOTE - NSICDXPASTMEDICALHX_GEN_ALL_CORE_FT
PAST MEDICAL HISTORY:  Alcohol abuse     Dementia     Drug abuse     No pertinent past medical history     Schizophrenia

## 2022-09-18 LAB
A1C WITH ESTIMATED AVERAGE GLUCOSE RESULT: 5.4 % — SIGNIFICANT CHANGE UP (ref 4–5.6)
ADD ON TEST-SPECIMEN IN LAB: SIGNIFICANT CHANGE UP
ADD ON TEST-SPECIMEN IN LAB: SIGNIFICANT CHANGE UP
ALBUMIN SERPL ELPH-MCNC: 3.2 G/DL — LOW (ref 3.3–5)
ALP SERPL-CCNC: 69 U/L — SIGNIFICANT CHANGE UP (ref 40–120)
ALT FLD-CCNC: 44 U/L — SIGNIFICANT CHANGE UP (ref 12–78)
ANION GAP SERPL CALC-SCNC: 7 MMOL/L — SIGNIFICANT CHANGE UP (ref 5–17)
APTT BLD: 32.5 SEC — SIGNIFICANT CHANGE UP (ref 27.5–35.5)
AST SERPL-CCNC: 107 U/L — HIGH (ref 15–37)
BASOPHILS # BLD AUTO: 0.04 K/UL — SIGNIFICANT CHANGE UP (ref 0–0.2)
BASOPHILS NFR BLD AUTO: 0.4 % — SIGNIFICANT CHANGE UP (ref 0–2)
BILIRUB SERPL-MCNC: 0.6 MG/DL — SIGNIFICANT CHANGE UP (ref 0.2–1.2)
BUN SERPL-MCNC: 19 MG/DL — SIGNIFICANT CHANGE UP (ref 7–23)
CALCIUM SERPL-MCNC: 9 MG/DL — SIGNIFICANT CHANGE UP (ref 8.5–10.1)
CHLORIDE SERPL-SCNC: 109 MMOL/L — HIGH (ref 96–108)
CHOLEST SERPL-MCNC: 122 MG/DL — SIGNIFICANT CHANGE UP
CO2 SERPL-SCNC: 25 MMOL/L — SIGNIFICANT CHANGE UP (ref 22–31)
CREAT SERPL-MCNC: 1.04 MG/DL — SIGNIFICANT CHANGE UP (ref 0.5–1.3)
CRP SERPL-MCNC: 160 MG/L — HIGH
CULTURE RESULTS: NO GROWTH — SIGNIFICANT CHANGE UP
EGFR: 76 ML/MIN/1.73M2 — SIGNIFICANT CHANGE UP
EOSINOPHIL # BLD AUTO: 0.11 K/UL — SIGNIFICANT CHANGE UP (ref 0–0.5)
EOSINOPHIL NFR BLD AUTO: 1.2 % — SIGNIFICANT CHANGE UP (ref 0–6)
ESTIMATED AVERAGE GLUCOSE: 108 MG/DL — SIGNIFICANT CHANGE UP (ref 68–114)
GLUCOSE SERPL-MCNC: 88 MG/DL — SIGNIFICANT CHANGE UP (ref 70–99)
HCT VFR BLD CALC: 36 % — LOW (ref 39–50)
HDLC SERPL-MCNC: 27 MG/DL — LOW
HGB BLD-MCNC: 12.2 G/DL — LOW (ref 13–17)
IMM GRANULOCYTES NFR BLD AUTO: 0.2 % — SIGNIFICANT CHANGE UP (ref 0–0.9)
INR BLD: 1.23 RATIO — HIGH (ref 0.88–1.16)
LIPID PNL WITH DIRECT LDL SERPL: 65 MG/DL — SIGNIFICANT CHANGE UP
LYMPHOCYTES # BLD AUTO: 0.73 K/UL — LOW (ref 1–3.3)
LYMPHOCYTES # BLD AUTO: 7.7 % — LOW (ref 13–44)
MCHC RBC-ENTMCNC: 28.2 PG — SIGNIFICANT CHANGE UP (ref 27–34)
MCHC RBC-ENTMCNC: 33.9 GM/DL — SIGNIFICANT CHANGE UP (ref 32–36)
MCV RBC AUTO: 83.1 FL — SIGNIFICANT CHANGE UP (ref 80–100)
MONOCYTES # BLD AUTO: 1.13 K/UL — HIGH (ref 0–0.9)
MONOCYTES NFR BLD AUTO: 12 % — SIGNIFICANT CHANGE UP (ref 2–14)
NEUTROPHILS # BLD AUTO: 7.39 K/UL — SIGNIFICANT CHANGE UP (ref 1.8–7.4)
NEUTROPHILS NFR BLD AUTO: 78.5 % — HIGH (ref 43–77)
NON HDL CHOLESTEROL: 95 MG/DL — SIGNIFICANT CHANGE UP
PLATELET # BLD AUTO: 171 K/UL — SIGNIFICANT CHANGE UP (ref 150–400)
POTASSIUM SERPL-MCNC: 3.1 MMOL/L — LOW (ref 3.5–5.3)
POTASSIUM SERPL-SCNC: 3.1 MMOL/L — LOW (ref 3.5–5.3)
PROT SERPL-MCNC: 7 GM/DL — SIGNIFICANT CHANGE UP (ref 6–8.3)
PROTHROM AB SERPL-ACNC: 14.3 SEC — HIGH (ref 10.5–13.4)
RBC # BLD: 4.33 M/UL — SIGNIFICANT CHANGE UP (ref 4.2–5.8)
RBC # FLD: 14.6 % — HIGH (ref 10.3–14.5)
SODIUM SERPL-SCNC: 141 MMOL/L — SIGNIFICANT CHANGE UP (ref 135–145)
SPECIMEN SOURCE: SIGNIFICANT CHANGE UP
T4 FREE+ TSH PNL SERPL: 2.66 UU/ML — SIGNIFICANT CHANGE UP (ref 0.34–4.82)
TRIGL SERPL-MCNC: 149 MG/DL — SIGNIFICANT CHANGE UP
TROPONIN I, HIGH SENSITIVITY RESULT: 95.05 NG/L — HIGH
WBC # BLD: 9.42 K/UL — SIGNIFICANT CHANGE UP (ref 3.8–10.5)
WBC # FLD AUTO: 9.42 K/UL — SIGNIFICANT CHANGE UP (ref 3.8–10.5)

## 2022-09-18 PROCEDURE — 93010 ELECTROCARDIOGRAM REPORT: CPT

## 2022-09-18 PROCEDURE — 99232 SBSQ HOSP IP/OBS MODERATE 35: CPT

## 2022-09-18 RX ORDER — POTASSIUM PHOSPHATE, MONOBASIC POTASSIUM PHOSPHATE, DIBASIC 236; 224 MG/ML; MG/ML
15 INJECTION, SOLUTION INTRAVENOUS ONCE
Refills: 0 | Status: COMPLETED | OUTPATIENT
Start: 2022-09-18 | End: 2022-09-18

## 2022-09-18 RX ORDER — POTASSIUM CHLORIDE 20 MEQ
40 PACKET (EA) ORAL ONCE
Refills: 0 | Status: COMPLETED | OUTPATIENT
Start: 2022-09-18 | End: 2022-09-18

## 2022-09-18 RX ORDER — OLANZAPINE 15 MG/1
5 TABLET, FILM COATED ORAL DAILY
Refills: 0 | Status: DISCONTINUED | OUTPATIENT
Start: 2022-09-18 | End: 2022-09-20

## 2022-09-18 RX ORDER — AMLODIPINE BESYLATE 2.5 MG/1
5 TABLET ORAL DAILY
Refills: 0 | Status: DISCONTINUED | OUTPATIENT
Start: 2022-09-18 | End: 2022-09-20

## 2022-09-18 RX ORDER — INFLUENZA VIRUS VACCINE 15; 15; 15; 15 UG/.5ML; UG/.5ML; UG/.5ML; UG/.5ML
0.7 SUSPENSION INTRAMUSCULAR ONCE
Refills: 0 | Status: DISCONTINUED | OUTPATIENT
Start: 2022-09-18 | End: 2022-09-20

## 2022-09-18 RX ORDER — ASPIRIN/CALCIUM CARB/MAGNESIUM 324 MG
162 TABLET ORAL ONCE
Refills: 0 | Status: COMPLETED | OUTPATIENT
Start: 2022-09-18 | End: 2022-09-18

## 2022-09-18 RX ORDER — CEFEPIME 1 G/1
1000 INJECTION, POWDER, FOR SOLUTION INTRAMUSCULAR; INTRAVENOUS EVERY 24 HOURS
Refills: 0 | Status: DISCONTINUED | OUTPATIENT
Start: 2022-09-18 | End: 2022-09-18

## 2022-09-18 RX ORDER — SODIUM CHLORIDE 9 MG/ML
1000 INJECTION INTRAMUSCULAR; INTRAVENOUS; SUBCUTANEOUS
Refills: 0 | Status: DISCONTINUED | OUTPATIENT
Start: 2022-09-18 | End: 2022-09-18

## 2022-09-18 RX ORDER — PANTOPRAZOLE SODIUM 20 MG/1
40 TABLET, DELAYED RELEASE ORAL
Refills: 0 | Status: DISCONTINUED | OUTPATIENT
Start: 2022-09-18 | End: 2022-09-20

## 2022-09-18 RX ORDER — ACETAMINOPHEN 500 MG
650 TABLET ORAL EVERY 6 HOURS
Refills: 0 | Status: DISCONTINUED | OUTPATIENT
Start: 2022-09-18 | End: 2022-09-20

## 2022-09-18 RX ORDER — SODIUM CHLORIDE 9 MG/ML
1000 INJECTION INTRAMUSCULAR; INTRAVENOUS; SUBCUTANEOUS
Refills: 0 | Status: DISCONTINUED | OUTPATIENT
Start: 2022-09-18 | End: 2022-09-19

## 2022-09-18 RX ORDER — VANCOMYCIN HCL 1 G
1000 VIAL (EA) INTRAVENOUS EVERY 24 HOURS
Refills: 0 | Status: DISCONTINUED | OUTPATIENT
Start: 2022-09-18 | End: 2022-09-18

## 2022-09-18 RX ORDER — CEFEPIME 1 G/1
1000 INJECTION, POWDER, FOR SOLUTION INTRAMUSCULAR; INTRAVENOUS EVERY 24 HOURS
Refills: 0 | Status: DISCONTINUED | OUTPATIENT
Start: 2022-09-18 | End: 2022-09-19

## 2022-09-18 RX ADMIN — SODIUM CHLORIDE 125 MILLILITER(S): 9 INJECTION INTRAMUSCULAR; INTRAVENOUS; SUBCUTANEOUS at 21:08

## 2022-09-18 RX ADMIN — OLANZAPINE 5 MILLIGRAM(S): 15 TABLET, FILM COATED ORAL at 09:41

## 2022-09-18 RX ADMIN — Medication 250 MILLIGRAM(S): at 01:02

## 2022-09-18 RX ADMIN — Medication 162 MILLIGRAM(S): at 05:07

## 2022-09-18 RX ADMIN — CEFEPIME 1000 MILLIGRAM(S): 1 INJECTION, POWDER, FOR SOLUTION INTRAMUSCULAR; INTRAVENOUS at 06:38

## 2022-09-18 RX ADMIN — AMLODIPINE BESYLATE 5 MILLIGRAM(S): 2.5 TABLET ORAL at 09:41

## 2022-09-18 RX ADMIN — POTASSIUM PHOSPHATE, MONOBASIC POTASSIUM PHOSPHATE, DIBASIC 62.5 MILLIMOLE(S): 236; 224 INJECTION, SOLUTION INTRAVENOUS at 16:10

## 2022-09-18 RX ADMIN — Medication 40 MILLIEQUIVALENT(S): at 15:36

## 2022-09-18 RX ADMIN — Medication 250 MILLIGRAM(S): at 09:40

## 2022-09-18 RX ADMIN — PANTOPRAZOLE SODIUM 40 MILLIGRAM(S): 20 TABLET, DELAYED RELEASE ORAL at 06:37

## 2022-09-18 RX ADMIN — SODIUM CHLORIDE 75 MILLILITER(S): 9 INJECTION INTRAMUSCULAR; INTRAVENOUS; SUBCUTANEOUS at 06:37

## 2022-09-18 NOTE — H&P ADULT - ASSESSMENT
71 y/o M w/ PMH of etoh abuse, dementia, schizophrenia, p/w rigors     *Severe Sepsis - unknown source  -Vanco / Cefepime  -F/u blood cx  -RVP / COVID19 negative  -UA negative  -CT chest / abdomen does not report any sources of infection  -ESR / CRP    -IVF  -Lactic acidosis now resolved  -ID consult     *Elevated troponnin  -ASA  -Trend troponins  -Cardio consult  -Echo  -Tele monitoring   -EKG     *Hypokalemia  -Replete and recheck  -EKG    *ALYSSA vs CKD?  -Baseline creatinine unknown  -IVF and trend creatinine in AM to check for improvement  -Avoid nephrotoxic agents   -Hold ACEi     *H/o Etoh abuse  -[facility] protocol  -MVI / Thiamine / folate     *Renal cyst  -F/u outpatient     *DVT ppx   -Heparin SubQ  71 y/o M w/ PMH of etoh abuse, dementia, schizophrenia, p/w rigors     *Severe Sepsis - unknown source  -Vanco / cefepime  -F/u blood cx  -RVP / COVID19 negative  -UA negative  -CT chest / abdomen does not report any sources of infection  -ESR / CRP    -IVF  -Lactic acidosis now resolved  -ID consult     *Elevated troponnin  -ASA  -Trend troponins  -Cardio consult  -Echo  -Tele monitoring   -EKG reviewed     *Hypokalemia  -Replete in ED. Will recheck K before giving any further K due to renal failure   -EKG reviewed     *ALYSSA vs CKD?  -Baseline creatinine unknown  -IVF and trend creatinine in AM to check for improvement  -Avoid nephrotoxic agents   -Hold ACEi     *H/o Etoh abuse  -States he hasn't had any Etoh since 2006    *Renal cyst  -F/u outpatient     *DVT ppx   -Heparin SubQ

## 2022-09-18 NOTE — PATIENT PROFILE ADULT - VISION (WITH CORRECTIVE LENSES IF THE PATIENT USUALLY WEARS THEM):
pt wears reading glasses at home/Partially impaired: cannot see medication labels or newsprint, but can see obstacles in path, and the surrounding layout; can count fingers at arm's length

## 2022-09-18 NOTE — CONSULT NOTE ADULT - SUBJECTIVE AND OBJECTIVE BOX
Patient is a 72y old  Male who presents with a chief complaint of rigors (18 Sep 2022 14:46)    HPI:  73 y/o M w/ PMH of etoh abuse, dementia, schizophrenia, admitted on  for evaluation of shaking rigors after showering; the patient also fell due to inbalance; the history per medical record as the patient is poor historian.       PMH: as above  PSH: as above  Meds: per reconciliation sheet, noted below  MEDICATIONS  (STANDING):  amLODIPine   Tablet 5 milliGRAM(s) Oral daily  cefepime  Injectable. 1000 milliGRAM(s) IV Push every 24 hours  influenza  Vaccine (HIGH DOSE) 0.7 milliLiter(s) IntraMuscular once  OLANZapine 5 milliGRAM(s) Oral daily  pantoprazole    Tablet 40 milliGRAM(s) Oral before breakfast  potassium chloride    Tablet ER 40 milliEquivalent(s) Oral once  potassium phosphate IVPB 15 milliMole(s) IV Intermittent once  sodium chloride 0.9%. 1000 milliLiter(s) (75 mL/Hr) IV Continuous <Continuous>  vancomycin  IVPB 1000 milliGRAM(s) IV Intermittent every 24 hours    MEDICATIONS  (PRN):  acetaminophen     Tablet .. 650 milliGRAM(s) Oral every 6 hours PRN Temp greater or equal to 38C (100.4F), Mild Pain (1 - 3)    Allergies    No Known Allergies    Intolerances      Social: no smoking, no alcohol, no illegal drugs; no recent travel, no exposure to TB  FAMILY HISTORY: unable to obtain secondary to patient medical condition      ROS unable to obtain secondary to patient medical condition     Vital Signs Last 24 Hrs  T(C): 36.7 (18 Sep 2022 13:38), Max: 38.1 (17 Sep 2022 16:28)  T(F): 98 (18 Sep 2022 13:38), Max: 100.6 (17 Sep 2022 16:28)  HR: 113 (18 Sep 2022 13:38) (64 - 113)  BP: 129/84 (18 Sep 2022 13:38) (92/77 - 129/84)  BP(mean): 89 (18 Sep 2022 06:06) (89 - 91)  RR: 18 (18 Sep 2022 13:38) (17 - 18)  SpO2: 98% (18 Sep 2022 13:38) (95% - 100%)    Parameters below as of 18 Sep 2022 13:38  Patient On (Oxygen Delivery Method): room air      Daily Height in cm: 154.94 (17 Sep 2022 16:28)    Daily Weight in k.3 (18 Sep 2022 06:22)    PE:    Constitutional: frail looking  HEENT: NC/AT, EOMI, PERRLA, conjunctivae clear; ears and nose atraumatic; pharynx clear  Neck: supple; thyroid not palpable  Back: no tenderness  Respiratory: respiratory effort normal; clear to auscultation  Cardiovascular: S1S2 regular, no murmurs  Abdomen: soft, not tender, not distended, positive BS; no liver or spleen organomegaly  Genitourinary: no suprapubic tenderness  Musculoskeletal: no muscle tenderness, no joint swelling or tenderness  Neurological/ Psychiatric: AxOx3, judgement and insight normal;  moving all extremities  Skin: no rashes; no palpable lesions    Labs: all available labs reviewed                        12.2   9.42  )-----------( 171      ( 18 Sep 2022 07:34 )             36.0     09-18    141  |  109<H>  |  19  ----------------------------<  88  3.1<L>   |  25  |  1.04    Ca    9.0      18 Sep 2022 07:34  Phos  1.4     -  Mg     2.2     -    TPro  7.0  /  Alb  3.2<L>  /  TBili  0.6  /  DBili  x   /  AST  107<H>  /  ALT  44  /  AlkPhos  69  09-18     LIVER FUNCTIONS - ( 18 Sep 2022 07:34 )  Alb: 3.2 g/dL / Pro: 7.0 gm/dL / ALK PHOS: 69 U/L / ALT: 44 U/L / AST: 107 U/L / GGT: x           Urinalysis Basic - ( 17 Sep 2022 20:07 )    Color: Yellow / Appearance: Clear / S.010 / pH: x  Gluc: x / Ketone: Negative  / Bili: Negative / Urobili: Negative   Blood: x / Protein: 30 mg/dL / Nitrite: Negative   Leuk Esterase: Negative / RBC: 11-25 /HPF / WBC 0-2   Sq Epi: x / Non Sq Epi: Occasional / Bacteria: Occasional    < from: CT Chest No Cont (22 @ 19:15) >    ACC: [id number] EXAM:  CT ABDOMEN AND PELVIS                        ACC: [id number] EXAM:  CT CHEST                          PROCEDURE DATE:  2022          INTERPRETATION:  CLINICAL INFORMATION: Fall    COMPARISON: None.    CONTRAST/COMPLICATIONS:  IV Contrast: NONE  Oral Contrast: NONE  Complications: None reported at time of study completion    PROCEDURE:  CT of the Chest, Abdomen and Pelvis was performed.  Sagittal and coronal reformats were performed.    FINDINGS:  CHEST:  LUNGS AND LARGEAIRWAYS: Patent central airways. Minimal biapical   paraseptal emphysema. No segmental consolidation.  PLEURA: No pleural effusion.  VESSELS: Atherosclerotic changes of the aorta and coronary vasculature.   No aortic aneurysm.  HEART: Heart size is normal. No pericardial effusion.  MEDIASTINUM AND MEHUL: No lymphadenopathy.  CHEST WALL AND LOWER NECK: Within normal limits.    ABDOMEN AND PELVIS:  LIVER: Within normal limits.  BILE DUCTS: Normal caliber.  GALLBLADDER: Within normal limits.  SPLEEN:Within normal limits.  PANCREAS: Within normal limits.  ADRENALS: Within normal limits.  KIDNEYS/URETERS: No hydronephrosis bilaterally. Posterior right midpole   exophytic renal cyst. No intrarenal calculi. The ureters are unremarkable.    BLADDER: Distended bladder.  REPRODUCTIVE ORGANS: Prostate within normal limits.    BOWEL: No bowel obstruction. Appendix is normal. Colonic diverticulosis.   Prior sigmoid resection.  PERITONEUM: No ascites.  VESSELS: Atherosclerotic changes.  RETROPERITONEUM/LYMPH NODES: No lymphadenopathy.  ABDOMINAL WALL: Within normal limits.  BONES: Prior anterior cervical spine fixation. Degenerative changes. No   definite displaced fractures.    IMPRESSION:  No definite evidence of solid visceral injury in the chest, abdomen or   pelvis.      < end of copied text >        Radiology: all available radiological tests reviewed    Advanced directives addressed: full resuscitation

## 2022-09-18 NOTE — H&P ADULT - CONVERSATION DETAILS
Patient denies having any advance care directives in place, and does not have any limitations on resuscitation status

## 2022-09-18 NOTE — PHYSICAL THERAPY INITIAL EVALUATION ADULT - PERTINENT HX OF CURRENT PROBLEM, REHAB EVAL
71 y/o M w/ PMH of etoh abuse, dementia, schizophrenia, p/w rigors. Patient states that yesterday he took a shower and when he was drying himself he was shaking. Then he went to put his shoes on, and the shaking caused him to fall to his R side. Denies hitting head or any injuries. States that today he had similar symptoms again, where he started to shiver a lot, associated with subjective fevers. Patient also c/o generalized weakness and fatigue.

## 2022-09-18 NOTE — PHYSICAL THERAPY INITIAL EVALUATION ADULT - GENERAL OBSERVATIONS, REHAB EVAL
Pt rec'd supine in bed, cooperative with PT, no acute complaints of pain, endorses continued rigors when drinking water.

## 2022-09-18 NOTE — CONSULT NOTE ADULT - SUBJECTIVE AND OBJECTIVE BOX
Patient is a 72y old  Male who presents with a chief complaint of rigors (18 Sep 2022 14:33)      HPI:  71 y/o M w/ PMH of etoh abuse, dementia, schizophrenia, p/w rigors. Patient states that yesterday he took a shower and when he was drying himself he was shaking. Then he went to put his shoes on, and the shaking caused him to fall to his R side. Denies hitting head or any injuries. States that today he had similar symptoms again, where he started to shiver a lot, associated with subjective fevers. Patient also c/o generalized weakness and fatigue. Denies CP, SOB, cough, runny nose, sore throat, nausea, vomiting, abdominal pain, diarrhea, rash, ulcer, dysuria, increased urinary frequency, HA, neck stiffness, ear ache.     PSH: abdominal surgery     Social Hx: Tobacco - quit 14 years ago, h/o etoh abuse - quit in , drugs -d enie s    Family Hx: Mother - stomach cancer   (18 Sep 2022 01:18)      PAST MEDICAL & SURGICAL HISTORY:  No pertinent past medical history      Alcohol abuse      Drug abuse      Schizophrenia      Dementia      No significant past surgical history          PREVIOUS CARDIAC WORKUP:      Echo:  Stress Test:  Cardiac Cath:      ALLERGIES:    No Known Allergies       MEDICATIONS  (STANDING):  amLODIPine   Tablet 5 milliGRAM(s) Oral daily  cefepime  Injectable. 1000 milliGRAM(s) IV Push every 24 hours  influenza  Vaccine (HIGH DOSE) 0.7 milliLiter(s) IntraMuscular once  OLANZapine 5 milliGRAM(s) Oral daily  pantoprazole    Tablet 40 milliGRAM(s) Oral before breakfast  potassium chloride    Tablet ER 40 milliEquivalent(s) Oral once  potassium phosphate IVPB 15 milliMole(s) IV Intermittent once  sodium chloride 0.9%. 1000 milliLiter(s) (75 mL/Hr) IV Continuous <Continuous>  vancomycin  IVPB 1000 milliGRAM(s) IV Intermittent every 24 hours    MEDICATIONS  (PRN):  acetaminophen     Tablet .. 650 milliGRAM(s) Oral every 6 hours PRN Temp greater or equal to 38C (100.4F), Mild Pain (1 - 3)      FAMILY HISTORY:        SOCIAL HISTORY:  .scl     ROS:     .ros    Vital Signs Last 24 Hrs  T(C): 36.7 (18 Sep 2022 13:38), Max: 38.1 (17 Sep 2022 16:28)  T(F): 98 (18 Sep 2022 13:38), Max: 100.6 (17 Sep 2022 16:28)  HR: 113 (18 Sep 2022 13:38) (64 - 113)  BP: 129/84 (18 Sep 2022 13:38) (92/77 - 129/84)  BP(mean): 89 (18 Sep 2022 06:06) (89 - 91)  RR: 18 (18 Sep 2022 13:38) (17 - 18)  SpO2: 98% (18 Sep 2022 13:38) (95% - 100%)    Parameters below as of 18 Sep 2022 13:38  Patient On (Oxygen Delivery Method): room air        I&O's Summary      PHYSICAL EXAM:    .phy      TELEMETRY:    ECG:    LABS:                          12.2   9.42  )-----------( 171      ( 18 Sep 2022 07:34 )             36.0     09-18    141  |  109<H>  |  19  ----------------------------<  88  3.1<L>   |  25  |  1.04    Ca    9.0      18 Sep 2022 07:34  Phos  1.4       Mg     2.2     18    TPro  7.0  /  Alb  3.2<L>  /  TBili  0.6  /  DBili  x   /  AST  107<H>  /  ALT  44  /  AlkPhos  69  18 @ 03:05  Trop-I  95.05    - @ 21:45  Trop-I  121.40     @ 16:57  Trop-I  76.05      PT/INR - ( 18 Sep 2022 07:34 )   PT: 14.3 sec;   INR: 1.23 ratio         PTT - ( 18 Sep 2022 07:34 )  PTT:32.5 sec  Urinalysis Basic - ( 17 Sep 2022 20:07 )    Color: Yellow / Appearance: Clear / S.010 / pH: x  Gluc: x / Ketone: Negative  / Bili: Negative / Urobili: Negative   Blood: x / Protein: 30 mg/dL / Nitrite: Negative   Leuk Esterase: Negative / RBC: 11-25 /HPF / WBC 0-2   Sq Epi: x / Non Sq Epi: Occasional / Bacteria: Occasional                  RADIOLOGY & ADDITIONAL STUDIES:     Patient is a 72y old  Male who presents with a chief complaint of rigors (18 Sep 2022 14:33)      HPI:  73 y/o M w/ PMH of etoh abuse, dementia, schizophrenia, p/w rigors. Patient states that yesterday he took a shower and when he was drying himself he was shaking. Then he went to put his shoes on, and the shaking caused him to fall to his R side. Denies hitting head or any injuries. States that today he had similar symptoms again, where he started to shiver a lot, associated with subjective fevers. Patient also c/o generalized weakness and fatigue. Denies CP, SOB, cough, runny nose, sore throat, nausea, vomiting, abdominal pain, diarrhea, rash, ulcer, dysuria, increased urinary frequency, HA, neck stiffness, ear ache.     NO chest pain. No shortness of breath. NO palpitations. Sinus tachy on tele. + fever, no pain. Says he is just nervous      PSH: abdominal surgery     Social Hx: Tobacco - quit 14 years ago, h/o etoh abuse - quit in , drugs -d ensukumar s    Family Hx: Mother - stomach cancer   (18 Sep 2022 01:18)      PAST MEDICAL & SURGICAL HISTORY:  No pertinent past medical history      Alcohol abuse      Drug abuse      Schizophrenia      Dementia      No significant past surgical history          PREVIOUS CARDIAC WORKUP:      Echo 22  CONCLUSION:  --LV global wall motion is normal.  --LV ejection fraction (65 %) is normal.  --Indeterminate left ventricular diastolic function.  --The global LV longitudinal strain using the speckle tracking technology is -18.5 %.  --There is mild aortic valve thickening. The aortic valve is mildly calcified.  --There is mild mitral regurgitation.  --There is moderate tricuspid regurgitation. Mild right atrial dilitation.  --There is trace pulmonic regurgitation.  --The right atrial pressure is normal (0 - 5 mm Hg). There is mild pulmonary hypertension. RVSP   38 mmHg.  --There is no pericardial effusion.      ALLERGIES:    No Known Allergies       MEDICATIONS  (STANDING):  amLODIPine   Tablet 5 milliGRAM(s) Oral daily  cefepime  Injectable. 1000 milliGRAM(s) IV Push every 24 hours  influenza  Vaccine (HIGH DOSE) 0.7 milliLiter(s) IntraMuscular once  OLANZapine 5 milliGRAM(s) Oral daily  pantoprazole    Tablet 40 milliGRAM(s) Oral before breakfast  potassium chloride    Tablet ER 40 milliEquivalent(s) Oral once  potassium phosphate IVPB 15 milliMole(s) IV Intermittent once  sodium chloride 0.9%. 1000 milliLiter(s) (75 mL/Hr) IV Continuous <Continuous>  vancomycin  IVPB 1000 milliGRAM(s) IV Intermittent every 24 hours    MEDICATIONS  (PRN):  acetaminophen     Tablet .. 650 milliGRAM(s) Oral every 6 hours PRN Temp greater or equal to 38C (100.4F), Mild Pain (1 - 3)      FAMILY HISTORY:  NC        SOCIAL HISTORY:  Nonsmoker. No ETOH abuse. No illicit drugs.     ROS:     Detailed ten system ROS was performed and was negative except for history as eluded to above.    + fever  + chills  no nausea  no vomiting  no diarrhea  no constipation  no melena  no hematochezia  no chest pain  no palpitations  no sob at rest  no dyspnea on exertion  no cough  no wheezing  no anorexia  no headache  no dizziness  no syncope  no weakness  no myalgia  no dysuria  no polyuria  no hematuria       Vital Signs Last 24 Hrs  T(C): 36.7 (18 Sep 2022 13:38), Max: 38.1 (17 Sep 2022 16:28)  T(F): 98 (18 Sep 2022 13:38), Max: 100.6 (17 Sep 2022 16:28)  HR: 113 (18 Sep 2022 13:38) (64 - 113)  BP: 129/84 (18 Sep 2022 13:38) (92/77 - 129/84)  BP(mean): 89 (18 Sep 2022 06:06) (89 - 91)  RR: 18 (18 Sep 2022 13:38) (17 - 18)  SpO2: 98% (18 Sep 2022 13:38) (95% - 100%)    Parameters below as of 18 Sep 2022 13:38  Patient On (Oxygen Delivery Method): room air        I&O's Summary      PHYSICAL EXAM:    General:                Comfortable, AAO X 3, in no distress.   HEENT:                  Atraumatic, PERRLA, EOMI, conjunctiva clear.   Neck:                     Supple, no adenopathy, no thyromegaly, no JVD, no bruit.  Back:                     Symmetric, non tender.  Chest:                    Clear, B/L symmetric air entry, no tachypnea  Heart:                     S1, S2 normal, no gallop, no murmur.  Abdomen:              Soft, non-tender, bowel sounds active. No palpable masses.  Extremities:           no cyanosis, no edema. Peripheral pulses normal.  Skin:                      Skin color, texture normal. No rashes.  Neurologic:            Grossly nonfocal.       TELEMETRY:   Sinus tachy    ECG:   NSR, no ST T changes of ischemia or infarction.     LABS:                          12.2   9.42  )-----------( 171      ( 18 Sep 2022 07:34 )             36.0         141  |  109<H>  |  19  ----------------------------<  88  3.1<L>   |  25  |  1.04    Ca    9.0      18 Sep 2022 07:34  Phos  1.4       Mg     2.2         TPro  7.0  /  Alb  3.2<L>  /  TBili  0.6  /  DBili  x   /  AST  107<H>  /  ALT  44  /  AlkPhos  69   @ 03:05  Trop-I  95.05     @ 21:45  Trop-I  121.40     @ 16:57  Trop-I  76.05      PT/INR - ( 18 Sep 2022 07:34 )   PT: 14.3 sec;   INR: 1.23 ratio         PTT - ( 18 Sep 2022 07:34 )  PTT:32.5 sec  Urinalysis Basic - ( 17 Sep 2022 20:07 )    Color: Yellow / Appearance: Clear / S.010 / pH: x  Gluc: x / Ketone: Negative  / Bili: Negative / Urobili: Negative   Blood: x / Protein: 30 mg/dL / Nitrite: Negative   Leuk Esterase: Negative / RBC: 11-25 /HPF / WBC 0-2   Sq Epi: x / Non Sq Epi: Occasional / Bacteria: Occasional        RADIOLOGY & ADDITIONAL STUDIES:

## 2022-09-18 NOTE — H&P ADULT - HISTORY OF PRESENT ILLNESS
71 y/o M w/ PMH of etoh abuse, dementia, schizophrenia, p/w rigors. Patient states that yesterday he took a shower and when he was drying himself he was shaking. Then he went to put his shoes on, and the shaking caused him to fall to his R side. Denies hitting head or any injuries. States that today he had similar symptoms again, where he started to shiver a lot, associated with subjective fevers. Patient also c/o generalized weakness and fatigue. Denies CP, SOB, cough, runny nose, sore throat, nausea, vomiting, abdominal pain, diarrhea, rash, ulcer, dysuria, increased urinary frequency, HA, neck stiffness, ear ache.     PSH: abdominal surgery     Social Hx: Tobacco - quit 14 years ago, h/o etoh abuse - quit in 2006, drugs -d andres crowell    Family Hx: Mother - stomach cancer

## 2022-09-18 NOTE — PATIENT PROFILE ADULT - FALL HARM RISK - HARM RISK INTERVENTIONS
Assistance with ambulation/Assistance OOB with selected safe patient handling equipment/Communicate Risk of Fall with Harm to all staff/Reinforce activity limits and safety measures with patient and family/Tailored Fall Risk Interventions/Use of alarms - bed, chair and/or voice tab/Visual Cue: Yellow wristband and red socks/Bed in lowest position, wheels locked, appropriate side rails in place/Call bell, personal items and telephone in reach/Instruct patient to call for assistance before getting out of bed or chair/Non-slip footwear when patient is out of bed/[place] [employer] to call system/Physically safe environment - no spills, clutter or unnecessary equipment/Purposeful Proactive Rounding/Room/bathroom lighting operational, light cord in reach

## 2022-09-18 NOTE — CONSULT NOTE ADULT - ASSESSMENT
71 y/o M w/ PMH of etoh abuse, dementia, schizophrenia, admitted on 9/17 for evaluation of shaking rigors after showering; the patient also fell due to inbalance; the history per medical record as the patient is poor historian.     1. Patient admitted with early sepsis of unclear etiology; doubt pneumonia given lack of findings on imaging  - follow up cultures   - serial cbc and monitor temperature   - reviewed prior medical records to evaluate for resistant or atypical pathogens   - iv hydration and supportive care   - patient may have been dehydrated leading to his symptoms?  - will continue cefepime for another 24 hours pending cultures  - hold on further vancomycin so as to avoid nephrotoxicity  2. other issues: per medicine
Sinus tachy. Possibly reactive . Pt with fever    Suggest:    Continue abx  Correct underlying trigger  Switch amlodipine to beta blockers if persistent tachycardia

## 2022-09-19 LAB
ADD ON TEST-SPECIMEN IN LAB: SIGNIFICANT CHANGE UP
ALBUMIN SERPL ELPH-MCNC: 3.3 G/DL — SIGNIFICANT CHANGE UP (ref 3.3–5)
ALP SERPL-CCNC: 79 U/L — SIGNIFICANT CHANGE UP (ref 40–120)
ALT FLD-CCNC: 42 U/L — SIGNIFICANT CHANGE UP (ref 12–78)
ANION GAP SERPL CALC-SCNC: 7 MMOL/L — SIGNIFICANT CHANGE UP (ref 5–17)
AST SERPL-CCNC: 80 U/L — HIGH (ref 15–37)
BASOPHILS # BLD AUTO: 0.02 K/UL — SIGNIFICANT CHANGE UP (ref 0–0.2)
BASOPHILS NFR BLD AUTO: 0.3 % — SIGNIFICANT CHANGE UP (ref 0–2)
BILIRUB SERPL-MCNC: 0.6 MG/DL — SIGNIFICANT CHANGE UP (ref 0.2–1.2)
BUN SERPL-MCNC: 12 MG/DL — SIGNIFICANT CHANGE UP (ref 7–23)
CALCIUM SERPL-MCNC: 9.1 MG/DL — SIGNIFICANT CHANGE UP (ref 8.5–10.1)
CHLORIDE SERPL-SCNC: 112 MMOL/L — HIGH (ref 96–108)
CK SERPL-CCNC: 993 U/L — HIGH (ref 26–308)
CO2 SERPL-SCNC: 22 MMOL/L — SIGNIFICANT CHANGE UP (ref 22–31)
CREAT SERPL-MCNC: 0.95 MG/DL — SIGNIFICANT CHANGE UP (ref 0.5–1.3)
EGFR: 85 ML/MIN/1.73M2 — SIGNIFICANT CHANGE UP
EOSINOPHIL # BLD AUTO: 0.17 K/UL — SIGNIFICANT CHANGE UP (ref 0–0.5)
EOSINOPHIL NFR BLD AUTO: 2.5 % — SIGNIFICANT CHANGE UP (ref 0–6)
GLUCOSE SERPL-MCNC: 109 MG/DL — HIGH (ref 70–99)
HCT VFR BLD CALC: 39.5 % — SIGNIFICANT CHANGE UP (ref 39–50)
HGB BLD-MCNC: 13.2 G/DL — SIGNIFICANT CHANGE UP (ref 13–17)
IMM GRANULOCYTES NFR BLD AUTO: 0.3 % — SIGNIFICANT CHANGE UP (ref 0–0.9)
LYMPHOCYTES # BLD AUTO: 0.86 K/UL — LOW (ref 1–3.3)
LYMPHOCYTES # BLD AUTO: 12.9 % — LOW (ref 13–44)
MAGNESIUM SERPL-MCNC: 2.1 MG/DL — SIGNIFICANT CHANGE UP (ref 1.6–2.6)
MCHC RBC-ENTMCNC: 28.1 PG — SIGNIFICANT CHANGE UP (ref 27–34)
MCHC RBC-ENTMCNC: 33.4 GM/DL — SIGNIFICANT CHANGE UP (ref 32–36)
MCV RBC AUTO: 84 FL — SIGNIFICANT CHANGE UP (ref 80–100)
MONOCYTES # BLD AUTO: 0.7 K/UL — SIGNIFICANT CHANGE UP (ref 0–0.9)
MONOCYTES NFR BLD AUTO: 10.5 % — SIGNIFICANT CHANGE UP (ref 2–14)
NEUTROPHILS # BLD AUTO: 4.92 K/UL — SIGNIFICANT CHANGE UP (ref 1.8–7.4)
NEUTROPHILS NFR BLD AUTO: 73.5 % — SIGNIFICANT CHANGE UP (ref 43–77)
PHOSPHATE SERPL-MCNC: 2.4 MG/DL — LOW (ref 2.5–4.5)
PLATELET # BLD AUTO: 192 K/UL — SIGNIFICANT CHANGE UP (ref 150–400)
POTASSIUM SERPL-MCNC: 3.3 MMOL/L — LOW (ref 3.5–5.3)
POTASSIUM SERPL-SCNC: 3.3 MMOL/L — LOW (ref 3.5–5.3)
PROT SERPL-MCNC: 7.7 GM/DL — SIGNIFICANT CHANGE UP (ref 6–8.3)
RBC # BLD: 4.7 M/UL — SIGNIFICANT CHANGE UP (ref 4.2–5.8)
RBC # FLD: 15.4 % — HIGH (ref 10.3–14.5)
SODIUM SERPL-SCNC: 141 MMOL/L — SIGNIFICANT CHANGE UP (ref 135–145)
WBC # BLD: 6.69 K/UL — SIGNIFICANT CHANGE UP (ref 3.8–10.5)
WBC # FLD AUTO: 6.69 K/UL — SIGNIFICANT CHANGE UP (ref 3.8–10.5)

## 2022-09-19 PROCEDURE — 99232 SBSQ HOSP IP/OBS MODERATE 35: CPT

## 2022-09-19 PROCEDURE — 93306 TTE W/DOPPLER COMPLETE: CPT | Mod: 26

## 2022-09-19 PROCEDURE — 93010 ELECTROCARDIOGRAM REPORT: CPT

## 2022-09-19 RX ORDER — SODIUM CHLORIDE 9 MG/ML
1000 INJECTION INTRAMUSCULAR; INTRAVENOUS; SUBCUTANEOUS
Refills: 0 | Status: DISCONTINUED | OUTPATIENT
Start: 2022-09-19 | End: 2022-09-20

## 2022-09-19 RX ORDER — POTASSIUM CHLORIDE 20 MEQ
40 PACKET (EA) ORAL ONCE
Refills: 0 | Status: COMPLETED | OUTPATIENT
Start: 2022-09-19 | End: 2022-09-19

## 2022-09-19 RX ORDER — SODIUM,POTASSIUM PHOSPHATES 278-250MG
1 POWDER IN PACKET (EA) ORAL
Refills: 0 | Status: COMPLETED | OUTPATIENT
Start: 2022-09-19 | End: 2022-09-20

## 2022-09-19 RX ADMIN — AMLODIPINE BESYLATE 5 MILLIGRAM(S): 2.5 TABLET ORAL at 09:31

## 2022-09-19 RX ADMIN — SODIUM CHLORIDE 125 MILLILITER(S): 9 INJECTION INTRAMUSCULAR; INTRAVENOUS; SUBCUTANEOUS at 03:34

## 2022-09-19 RX ADMIN — Medication 1 PACKET(S): at 17:22

## 2022-09-19 RX ADMIN — Medication 1 PACKET(S): at 12:52

## 2022-09-19 RX ADMIN — OLANZAPINE 5 MILLIGRAM(S): 15 TABLET, FILM COATED ORAL at 09:31

## 2022-09-19 RX ADMIN — PANTOPRAZOLE SODIUM 40 MILLIGRAM(S): 20 TABLET, DELAYED RELEASE ORAL at 05:31

## 2022-09-19 RX ADMIN — Medication 650 MILLIGRAM(S): at 16:57

## 2022-09-19 RX ADMIN — SODIUM CHLORIDE 125 MILLILITER(S): 9 INJECTION INTRAMUSCULAR; INTRAVENOUS; SUBCUTANEOUS at 17:23

## 2022-09-19 RX ADMIN — CEFEPIME 1000 MILLIGRAM(S): 1 INJECTION, POWDER, FOR SOLUTION INTRAMUSCULAR; INTRAVENOUS at 05:35

## 2022-09-19 RX ADMIN — Medication 650 MILLIGRAM(S): at 01:56

## 2022-09-19 RX ADMIN — Medication 40 MILLIEQUIVALENT(S): at 17:22

## 2022-09-19 NOTE — PROGRESS NOTE ADULT - ASSESSMENT
Sinus tachy  HTN  Possibly reactive . Pt with fever    Suggest:  Tachycardia likely due to possible infection, which improved with antibiotics and partially driven by amlodipine.  Free T4 slightly increased but TSH normal-we will defer to primary.  Continue amlodipine.  CPK decreasing.  Continue IV fluids.  Will follow as needed.   Sinus tachy  HTN  Possibly reactive . Pt with fever  PFO    Suggest:  Tachycardia likely due to possible infection, which improved with antibiotics and partially driven by amlodipine.  Free T4 slightly increased but TSH normal-we will defer to primary.  Continue amlodipine.  CPK decreasing.  Continue IV fluids.  PFO on echo, normal LVEF.  Will follow as needed.

## 2022-09-19 NOTE — PROGRESS NOTE ADULT - ASSESSMENT
71 y/o M w/ PMH of etoh abuse, dementia, schizophrenia, admitted on 9/17 for evaluation of shaking rigors after showering; the patient also fell due to inbalance; the history per medical record as the patient is poor historian.     1. Patient admitted with early sepsis of unclear etiology; doubt pneumonia given lack of findings on imaging  - follow up cultures   - serial cbc and monitor temperature   - reviewed prior medical records to evaluate for resistant or atypical pathogens   - iv hydration and supportive care   - patient may have been dehydrated leading to his symptoms?  - will stop antibiotics and observe off antibiotics  - hold on further vancomycin so as to avoid nephrotoxicity  2. other issues: per medicine

## 2022-09-19 NOTE — PROGRESS NOTE ADULT - ASSESSMENT
MEDICATIONS  (STANDING):  amLODIPine   Tablet 5 milliGRAM(s) Oral daily  cefepime  Injectable. 1000 milliGRAM(s) IV Push every 24 hours  influenza  Vaccine (HIGH DOSE) 0.7 milliLiter(s) IntraMuscular once  OLANZapine 5 milliGRAM(s) Oral daily  pantoprazole    Tablet 40 milliGRAM(s) Oral before breakfast  potassium chloride    Tablet ER 40 milliEquivalent(s) Oral once  potassium phosphate / sodium phosphate Powder (PHOS-NaK) 1 Packet(s) Oral three times a day with meals  sodium chloride 0.9%. 1000 milliLiter(s) (125 mL/Hr) IV Continuous <Continuous>    MEDICATIONS  (PRN):  acetaminophen     Tablet .. 650 milliGRAM(s) Oral every 6 hours PRN Temp greater or equal to 38C (100.4F), Mild Pain (1 - 3)      71 y/o M w/ PMH of etoh abuse, dementia, schizophrenia, p/w rigors     #SIRS (w/ Tachycardia, fever) with ALYSSA. No identifiable source of infection. Possibly from Rhabdo?  #Rhabdomyolysis  #ALYSSA  #Elevated Transaminases.   #Elevated Troponin due to supply/demand ischemia due to above  -Vanco / cefepime  -Blood/Urine Cultures (9/17): NGTD  -RVP / COVID19 negative  -UA negative  -CT chest / abdomen does not report any sources of infection  -ESR / CRP  elevated   -Lactic acidosis now resolved  -ID consult   -CPK elevated day after admission. Presentation could have been from rhabdomyolysis but unable to clinically determine without CPK on admission labs. Continue IVF  -ASA  -Troponins elevated but no significant rise indicating  -Cardio consult  -Echo  -Tele monitoring   -EKG reviewed       *H/o Etoh abuse  -States he hasn't had any Etoh since 2006    *Renal cyst  -F/u outpatient     Psych Disorder NOS/Schizophrenia  -Continue home olanzapine.     *DVT ppx   -Heparin SubQ

## 2022-09-20 ENCOUNTER — TRANSCRIPTION ENCOUNTER (OUTPATIENT)
Age: 72
End: 2022-09-20

## 2022-09-20 VITALS
TEMPERATURE: 98 F | HEART RATE: 82 BPM | SYSTOLIC BLOOD PRESSURE: 118 MMHG | RESPIRATION RATE: 18 BRPM | OXYGEN SATURATION: 99 % | DIASTOLIC BLOOD PRESSURE: 94 MMHG

## 2022-09-20 LAB
ADD ON TEST-SPECIMEN IN LAB: SIGNIFICANT CHANGE UP
ALBUMIN SERPL ELPH-MCNC: 3.3 G/DL — SIGNIFICANT CHANGE UP (ref 3.3–5)
ALP SERPL-CCNC: 82 U/L — SIGNIFICANT CHANGE UP (ref 40–120)
ALT FLD-CCNC: 37 U/L — SIGNIFICANT CHANGE UP (ref 12–78)
ANION GAP SERPL CALC-SCNC: 7 MMOL/L — SIGNIFICANT CHANGE UP (ref 5–17)
AST SERPL-CCNC: 52 U/L — HIGH (ref 15–37)
BILIRUB SERPL-MCNC: 0.6 MG/DL — SIGNIFICANT CHANGE UP (ref 0.2–1.2)
BUN SERPL-MCNC: 13 MG/DL — SIGNIFICANT CHANGE UP (ref 7–23)
CALCIUM SERPL-MCNC: 9.2 MG/DL — SIGNIFICANT CHANGE UP (ref 8.5–10.1)
CHLORIDE SERPL-SCNC: 114 MMOL/L — HIGH (ref 96–108)
CO2 SERPL-SCNC: 21 MMOL/L — LOW (ref 22–31)
CREAT SERPL-MCNC: 0.87 MG/DL — SIGNIFICANT CHANGE UP (ref 0.5–1.3)
EGFR: 91 ML/MIN/1.73M2 — SIGNIFICANT CHANGE UP
GLUCOSE SERPL-MCNC: 98 MG/DL — SIGNIFICANT CHANGE UP (ref 70–99)
HCT VFR BLD CALC: 37.2 % — LOW (ref 39–50)
HGB BLD-MCNC: 12.7 G/DL — LOW (ref 13–17)
MCHC RBC-ENTMCNC: 28.4 PG — SIGNIFICANT CHANGE UP (ref 27–34)
MCHC RBC-ENTMCNC: 34.1 GM/DL — SIGNIFICANT CHANGE UP (ref 32–36)
MCV RBC AUTO: 83.2 FL — SIGNIFICANT CHANGE UP (ref 80–100)
PHOSPHATE SERPL-MCNC: 2.7 MG/DL — SIGNIFICANT CHANGE UP (ref 2.5–4.5)
PLATELET # BLD AUTO: 235 K/UL — SIGNIFICANT CHANGE UP (ref 150–400)
POTASSIUM SERPL-MCNC: 3.4 MMOL/L — LOW (ref 3.5–5.3)
POTASSIUM SERPL-SCNC: 3.4 MMOL/L — LOW (ref 3.5–5.3)
PROT SERPL-MCNC: 7.5 GM/DL — SIGNIFICANT CHANGE UP (ref 6–8.3)
RBC # BLD: 4.47 M/UL — SIGNIFICANT CHANGE UP (ref 4.2–5.8)
RBC # FLD: 15.6 % — HIGH (ref 10.3–14.5)
SODIUM SERPL-SCNC: 142 MMOL/L — SIGNIFICANT CHANGE UP (ref 135–145)
WBC # BLD: 7.02 K/UL — SIGNIFICANT CHANGE UP (ref 3.8–10.5)
WBC # FLD AUTO: 7.02 K/UL — SIGNIFICANT CHANGE UP (ref 3.8–10.5)

## 2022-09-20 PROCEDURE — 99239 HOSP IP/OBS DSCHRG MGMT >30: CPT

## 2022-09-20 RX ORDER — LISINOPRIL 2.5 MG/1
1 TABLET ORAL
Qty: 0 | Refills: 0 | DISCHARGE

## 2022-09-20 RX ORDER — POTASSIUM CHLORIDE 20 MEQ
40 PACKET (EA) ORAL ONCE
Refills: 0 | Status: COMPLETED | OUTPATIENT
Start: 2022-09-20 | End: 2022-09-20

## 2022-09-20 RX ORDER — METOPROLOL TARTRATE 50 MG
0.5 TABLET ORAL
Qty: 30 | Refills: 0
Start: 2022-09-20 | End: 2022-10-19

## 2022-09-20 RX ORDER — METOPROLOL TARTRATE 50 MG
12.5 TABLET ORAL EVERY 12 HOURS
Refills: 0 | Status: DISCONTINUED | OUTPATIENT
Start: 2022-09-20 | End: 2022-09-20

## 2022-09-20 RX ADMIN — AMLODIPINE BESYLATE 5 MILLIGRAM(S): 2.5 TABLET ORAL at 09:20

## 2022-09-20 RX ADMIN — Medication 1 PACKET(S): at 09:19

## 2022-09-20 RX ADMIN — Medication 40 MILLIEQUIVALENT(S): at 12:01

## 2022-09-20 RX ADMIN — OLANZAPINE 5 MILLIGRAM(S): 15 TABLET, FILM COATED ORAL at 09:20

## 2022-09-20 RX ADMIN — PANTOPRAZOLE SODIUM 40 MILLIGRAM(S): 20 TABLET, DELAYED RELEASE ORAL at 06:01

## 2022-09-20 RX ADMIN — Medication 12.5 MILLIGRAM(S): at 14:53

## 2022-09-20 NOTE — PROGRESS NOTE ADULT - SUBJECTIVE AND OBJECTIVE BOX
CHIEF COMPLAINT: Rigors;Subjective fevers/weakness    SUBJECTIVE: Rigors resolved. No fevers. More energy. Denies dizziness, chest pain, SOB, nausea, vomiting, abdominal pain/discomfort    SIGNIFICANT INTERVAL EVENTS/OVERNIGHT EVENTS: None    Review of Systems: 14 Point review of systems reviewed and reported as negative unless otherwise stated in HPI    FROM H&P:  "71 y/o M w/ PMH of etoh abuse, dementia, schizophrenia, p/w rigors. Patient states that yesterday he took a shower and when he was drying himself he was shaking. Then he went to put his shoes on, and the shaking caused him to fall to his R side. Denies hitting head or any injuries. States that today he had similar symptoms again, where he started to shiver a lot, associated with subjective fevers. Patient also c/o generalized weakness and fatigue. Denies CP, SOB, cough, runny nose, sore throat, nausea, vomiting, abdominal pain, diarrhea, rash, ulcer, dysuria, increased urinary frequency, HA, neck stiffness, ear ache. "    PHYSICAL EXAM:    T(C): 36.7 (09-18-22 @ 13:38), Max: 38.1 (09-17-22 @ 16:28)  HR: 113 (09-18-22 @ 13:38) (64 - 113)  BP: 129/84 (09-18-22 @ 13:38) (92/77 - 129/84)  RR: 18 (09-18-22 @ 13:38) (17 - 18)  SpO2: 98% (09-18-22 @ 13:38) (95% - 100%)    General: AAOx3; NAD  Head: AT/NC  ENT: Moist Mucous Membranes; No Injury  Eyes: EOMI; PERRL  Neck: Non-tender; No JVD  CVS: RRR, S1&S2, No murmur, No edema  Respiratory: Lungs CTA B/L; Normal Respiratory Effort  Abdomen/GI: Soft, non-tender, non-distended, no guarding, no rebound, normal bowel sounds  : No bladder distention, No Abernathy  Extremities: No cyanosis, No clubbing, No edema  MSK: No CVA tenderness, Normal ROM, No injury  Neuro: AAOx3, CNII-XII grossly intact, non-focal  Psych: Appropriate, Cooperative, No depression, No anxiety  Skin: Clean, Dry and Intact      LABS:                          12.2   9.42  )-----------( 171      ( 18 Sep 2022 07:34 )             36.0     09-18    141  |  109<H>  |  19  ----------------------------<  88  3.1<L>   |  25  |  1.04    Ca    9.0      18 Sep 2022 07:34  Phos  1.4     09-18  Mg     2.2     09-18    TPro  7.0  /  Alb  3.2<L>  /  TBili  0.6  /  DBili  x   /  AST  107<H>  /  ALT  44  /  AlkPhos  69  09-18    SARS-CoV-2: NotDetec (17 Sep 2022 16:57)    CAPILLARY BLOOD GLUCOSE      Phosphorus Level, Serum: 1.4 mg/dL (09.18.22 @ 07:34)Sedimentation Rate, Erythrocyte: 28 mm/hr (09.18.22 @ 07:34) A1C with Estimated Average Glucose Result: 5.4:Troponin I, High Sensitivity Result: 95.05:Lactate, Blood: 1.2Troponin I, High Sensitivity Result: 121.40: Urinalysis (09.17.22 @ 20:07)   pH Urine: 8.0   Glucose Qualitative, Urine: Negative   Blood, Urine: Large   Color: Yellow   Urine Appearance: Clear   Bilirubin: Negative   Ketone - Urine: Negative   Specific Gravity: 1.010   Protein, Urine: 30 mg/dL   Urobilinogen: Negative   Nitrite: Negative   Leukocyte Esterase Concentration: Negative Troponin I, High Sensitivity Result: 76.05        RADIOLOGY:  < from: CT Chest No Cont (09.17.22 @ 19:15) >  IMPRESSION:  No definite evidence of solid visceral injury in the chest, abdomen or   pelvis.    < end of copied text >  < from: CT Head No Cont (09.17.22 @ 19:15) >  IMPRESSION:    CT HEAD: Mild to moderate periventricular white matter ischemia.    CT cervical spine:   No vertebral fracture is recognized. Mild   degenerative disc disease and spondylosis at C3-4 through C5-6 with loss   of disc height and associated degenerative endplate changes. There is   narrowing of the RIGHT C2-3, BILATERAL C3-4, RIGHT C4-5, RIGHT C5-C6   neural foramina due to uncovertebral spurring and facet osteophytic   hypertrophy. Degenerative cord impingement is seen at C4-5 due to   posterior osteophytic ridge/disc complex.    < end of copied text >      EKG:  < from: 12 Lead ECG (09.18.22 @ 07:53) >  Diagnosis Line Normal sinus rhythm  Normal ECG  When compared with ECG of 17-SEP-2022 16:39,  No significant change was found    < end of copied text >            I personally reviewed labs, imaging, ekg, orders and vitals.    Discussed case with:  [X]RN  [X]CM/SW  [X]Patient  []Family  [X]Specialist: Neurology        MEDICATIONS  (STANDING):  amLODIPine   Tablet 5 milliGRAM(s) Oral daily  cefepime  Injectable. 1000 milliGRAM(s) IV Push every 24 hours  influenza  Vaccine (HIGH DOSE) 0.7 milliLiter(s) IntraMuscular once  OLANZapine 5 milliGRAM(s) Oral daily  pantoprazole    Tablet 40 milliGRAM(s) Oral before breakfast  potassium chloride    Tablet ER 40 milliEquivalent(s) Oral once  potassium phosphate IVPB 15 milliMole(s) IV Intermittent once  sodium chloride 0.9%. 1000 milliLiter(s) (75 mL/Hr) IV Continuous <Continuous>  vancomycin  IVPB 1000 milliGRAM(s) IV Intermittent every 24 hours    MEDICATIONS  (PRN):  acetaminophen     Tablet .. 650 milliGRAM(s) Oral every 6 hours PRN Temp greater or equal to 38C (100.4F), Mild Pain (1 - 3)    
The patient was seen and examined. HR stable.  No chest pain.  No shortness of breath.  No palpitations.    HPI:  71 y/o M w/ PMH of etoh abuse, dementia, hypertension, schizophrenia, GERD and multi pack-year tobacco abuse, p/w rigors. Patient states that yesterday he took a shower and when he was drying himself he was shaking. Then he went to put his shoes on, and the shaking caused him to fall to his R side. Denies hitting head or any injuries. States that today he had similar symptoms again, where he started to shiver a lot, associated with subjective fevers. Patient also c/o generalized weakness and fatigue. Denies CP, SOB, cough, runny nose, sore throat, nausea, vomiting, abdominal pain, diarrhea, rash, ulcer, dysuria, increased urinary frequency, HA, neck stiffness, ear ache.     NO chest pain. No shortness of breath. NO palpitations. Sinus tachy on tele. + fever, no pain. Says he is just nervous      PSH: abdominal surgery     Social Hx: Tobacco - quit 14 years ago, h/o etoh abuse - quit in , drugs -d enie s    Family Hx: Mother - stomach cancer   (18 Sep 2022 01:18)      PAST MEDICAL & SURGICAL HISTORY:  No pertinent past medical history      Alcohol abuse      Drug abuse      Schizophrenia      Dementia      No significant past surgical history          PREVIOUS CARDIAC WORKUP:      Echo 22  CONCLUSION:  --LV global wall motion is normal.  --LV ejection fraction (65 %) is normal.  --Indeterminate left ventricular diastolic function.  --The global LV longitudinal strain using the speckle tracking technology is -18.5 %.  --There is mild aortic valve thickening. The aortic valve is mildly calcified.  --There is mild mitral regurgitation.  --There is moderate tricuspid regurgitation. Mild right atrial dilitation.  --There is trace pulmonic regurgitation.  --The right atrial pressure is normal (0 - 5 mm Hg). There is mild pulmonary hypertension. RVSP   38 mmHg.  --There is no pericardial effusion.      ALLERGIES:    No Known Allergies       Review of systems: A 10 point review of system has been performed, and is negative except for what has been mentioned in the above history of present illness.     MEDICATIONS  (STANDING):  amLODIPine   Tablet 5 milliGRAM(s) Oral daily  influenza  Vaccine (HIGH DOSE) 0.7 milliLiter(s) IntraMuscular once  metoprolol tartrate 12.5 milliGRAM(s) Oral every 12 hours  OLANZapine 5 milliGRAM(s) Oral daily  pantoprazole    Tablet 40 milliGRAM(s) Oral before breakfast    MEDICATIONS  (PRN):  acetaminophen     Tablet .. 650 milliGRAM(s) Oral every 6 hours PRN Temp greater or equal to 38C (100.4F), Mild Pain (1 - 3)      Vital Signs Last 24 Hrs  T(C): 36.6 (20 Sep 2022 17:45), Max: 36.9 (20 Sep 2022 08:11)  T(F): 97.8 (20 Sep 2022 17:45), Max: 98.4 (20 Sep 2022 08:11)  HR: 82 (20 Sep 2022 17:45) (82 - 90)  BP: 118/94 (20 Sep 2022 17:45) (105/70 - 148/91)  BP(mean): 83 (20 Sep 2022 14:55) (83 - 83)  RR: 18 (20 Sep 2022 17:45) (17 - 18)  SpO2: 99% (20 Sep 2022 17:45) (93% - 99%)    Parameters below as of 20 Sep 2022 17:45  Patient On (Oxygen Delivery Method): room air      I&O's Summary      PHYSICAL EXAM:    General:                Comfortable, AAO X 3, in no distress.   HEENT:                  Atraumatic, PERRLA, EOMI, conjunctiva clear.   Neck:                     Supple, no adenopathy, no thyromegaly, no JVD, no bruit.  Back:                     Symmetric, non tender.  Chest:                    Clear, B/L symmetric air entry, no tachypnea  Heart:                     S1, S2 normal, no gallop, no murmur.  Abdomen:              Soft, non-tender, bowel sounds active. No palpable masses.  Extremities:           no cyanosis, no edema. Peripheral pulses normal.  Skin:                      Skin color, texture normal. No rashes.                        12.7   7.02  )-----------( 235      ( 20 Sep 2022 08:15 )             37.2          09-20    142  |  114<H>  |  13  ----------------------------<  98  3.4<L>   |  21<L>  |  0.87    Ca    9.2      20 Sep 2022 08:15  Phos  2.7     09-20  Mg     2.1     -19    TPro  7.5  /  Alb  3.3  /  TBili  0.6  /  DBili  x   /  AST  52<H>  /  ALT  37  /  AlkPhos  82  09-20    CARDIAC MARKERS ( 20 Sep 2022 08:15 )  x     / x     / 411 U/L / x     / x      CARDIAC MARKERS ( 19 Sep 2022 08:56 )  x     / x     / 993 U/L / x     / x              Neurologic:            Grossly nonfocal.       TELEMETRY:   Sinus tachy    ECG:   NSR, no ST T changes of ischemia or infarction.     LABS:                PTT - ( 18 Sep 2022 07:34 )  PTT:32.5 sec  Urinalysis Basic - ( 17 Sep 2022 20:07 )    Color: Yellow / Appearance: Clear / S.010 / pH: x  Gluc: x / Ketone: Negative  / Bili: Negative / Urobili: Negative   Blood: x / Protein: 30 mg/dL / Nitrite: Negative   Leuk Esterase: Negative / RBC: 11-25 /HPF / WBC 0-2   Sq Epi: x / Non Sq Epi: Occasional / Bacteria: Occasional        - @ 16:57  Trop-I  76.05      PT/INR - ( 18 Sep 2022 07:34 )   PT: 14.3 sec;   INR: 1.23 ratio         PTT - ( 18 Sep 2022 07:34 )  PTT:32.5 sec  Urinalysis Basic - ( 17 Sep 2022 20:07 )    Color: Yellow / Appearance: Clear / S.010 / pH: x  Gluc: x / Ketone: Negative  / Bili: Negative / Urobili: Negative   Blood: x / Protein: 30 mg/dL / Nitrite: Negative   Leuk Esterase: Negative / RBC: 11-25 /HPF / WBC 0-2   Sq Epi: x / Non Sq Epi: Occasional / Bacteria: Occasional        RADIOLOGY & ADDITIONAL STUDIES:    
The patient was seen examined.  Tachycardia improved.  No chest discomfort or shortness of breath.      HPI:  71 y/o M w/ PMH of etoh abuse, dementia, hypertension, schizophrenia, GERD and multi pack-year tobacco abuse, p/w rigors. Patient states that yesterday he took a shower and when he was drying himself he was shaking. Then he went to put his shoes on, and the shaking caused him to fall to his R side. Denies hitting head or any injuries. States that today he had similar symptoms again, where he started to shiver a lot, associated with subjective fevers. Patient also c/o generalized weakness and fatigue. Denies CP, SOB, cough, runny nose, sore throat, nausea, vomiting, abdominal pain, diarrhea, rash, ulcer, dysuria, increased urinary frequency, HA, neck stiffness, ear ache.     NO chest pain. No shortness of breath. NO palpitations. Sinus tachy on tele. + fever, no pain. Says he is just nervous      PSH: abdominal surgery     Social Hx: Tobacco - quit 14 years ago, h/o etoh abuse - quit in , drugs -d enie s    Family Hx: Mother - stomach cancer   (18 Sep 2022 01:18)      PAST MEDICAL & SURGICAL HISTORY:  No pertinent past medical history      Alcohol abuse      Drug abuse      Schizophrenia      Dementia      No significant past surgical history          PREVIOUS CARDIAC WORKUP:      Echo 22  CONCLUSION:  --LV global wall motion is normal.  --LV ejection fraction (65 %) is normal.  --Indeterminate left ventricular diastolic function.  --The global LV longitudinal strain using the speckle tracking technology is -18.5 %.  --There is mild aortic valve thickening. The aortic valve is mildly calcified.  --There is mild mitral regurgitation.  --There is moderate tricuspid regurgitation. Mild right atrial dilitation.  --There is trace pulmonic regurgitation.  --The right atrial pressure is normal (0 - 5 mm Hg). There is mild pulmonary hypertension. RVSP   38 mmHg.  --There is no pericardial effusion.      ALLERGIES:    No Known Allergies       Review of systems: A 10 point review of system has been performed, and is negative except for what has been mentioned in the above history of present illness.    MEDICATIONS  (STANDING):  amLODIPine   Tablet 5 milliGRAM(s) Oral daily  cefepime  Injectable. 1000 milliGRAM(s) IV Push every 24 hours  influenza  Vaccine (HIGH DOSE) 0.7 milliLiter(s) IntraMuscular once  OLANZapine 5 milliGRAM(s) Oral daily  pantoprazole    Tablet 40 milliGRAM(s) Oral before breakfast  sodium chloride 0.9%. 1000 milliLiter(s) (125 mL/Hr) IV Continuous <Continuous>    MEDICATIONS  (PRN):  acetaminophen     Tablet .. 650 milliGRAM(s) Oral every 6 hours PRN Temp greater or equal to 38C (100.4F), Mild Pain (1 - 3)      Vital Signs Last 24 Hrs  T(C): 36.6 (19 Sep 2022 08:42), Max: 37.1 (18 Sep 2022 17:05)  T(F): 97.9 (19 Sep 2022 08:42), Max: 98.8 (18 Sep 2022 17:05)  HR: 87 (19 Sep 2022 08:42) (87 - 113)  BP: 135/83 (19 Sep 2022 08:42) (106/81 - 135/83)  BP(mean): --  RR: 18 (19 Sep 2022 08:42) (18 - 18)  SpO2: 96% (19 Sep 2022 08:42) (96% - 98%)    Parameters below as of 18 Sep 2022 20:55  Patient On (Oxygen Delivery Method): room air      I&O's Summary    18 Sep 2022 07:01  -  19 Sep 2022 07:00  --------------------------------------------------------  IN: 0 mL / OUT: 175 mL / NET: -175 mL      PHYSICAL EXAM:    General:                Comfortable, AAO X 3, in no distress.   HEENT:                  Atraumatic, PERRLA, EOMI, conjunctiva clear.   Neck:                     Supple, no adenopathy, no thyromegaly, no JVD, no bruit.  Back:                     Symmetric, non tender.  Chest:                    Clear, B/L symmetric air entry, no tachypnea  Heart:                     S1, S2 normal, no gallop, no murmur.  Abdomen:              Soft, non-tender, bowel sounds active. No palpable masses.  Extremities:           no cyanosis, no edema. Peripheral pulses normal.  Skin:                      Skin color, texture normal. No rashes.  Neurologic:            Grossly nonfocal.       TELEMETRY:   Sinus tachy    ECG:   NSR, no ST T changes of ischemia or infarction.     LABS:                                      13.2   6.69  )-----------( 192      ( 19 Sep 2022 08:56 )             39.5          09-19    141  |  112<H>  |  12  ----------------------------<  109<H>  3.3<L>   |  22  |  0.95    Ca    9.1      19 Sep 2022 08:56  Phos  2.4       Mg     2.1     -19    TPro  7.7  /  Alb  3.3  /  TBili  0.6  /  DBili  x   /  AST  80<H>  /  ALT  42  /  AlkPhos  79  09-19    CARDIAC MARKERS ( 19 Sep 2022 08:56 )  x     / x     / 993 U/L / x     / x      CARDIAC MARKERS ( 18 Sep 2022 15:27 )  x     / x     / 1802 U/L / x     / x          PT/INR - ( 18 Sep 2022 07:34 )   PT: 14.3 sec;   INR: 1.23 ratio         PTT - ( 18 Sep 2022 07:34 )  PTT:32.5 sec  Urinalysis Basic - ( 17 Sep 2022 20:07 )    Color: Yellow / Appearance: Clear / S.010 / pH: x  Gluc: x / Ketone: Negative  / Bili: Negative / Urobili: Negative   Blood: x / Protein: 30 mg/dL / Nitrite: Negative   Leuk Esterase: Negative / RBC: 11-25 /HPF / WBC 0-2   Sq Epi: x / Non Sq Epi: Occasional / Bacteria: Occasional        - @ 16:57  Trop-I  76.05      PT/INR - ( 18 Sep 2022 07:34 )   PT: 14.3 sec;   INR: 1.23 ratio         PTT - ( 18 Sep 2022 07:34 )  PTT:32.5 sec  Urinalysis Basic - ( 17 Sep 2022 20:07 )    Color: Yellow / Appearance: Clear / S.010 / pH: x  Gluc: x / Ketone: Negative  / Bili: Negative / Urobili: Negative   Blood: x / Protein: 30 mg/dL / Nitrite: Negative   Leuk Esterase: Negative / RBC: 11-25 /HPF / WBC 0-2   Sq Epi: x / Non Sq Epi: Occasional / Bacteria: Occasional        RADIOLOGY & ADDITIONAL STUDIES:    
      Date of service: 09-19-22 @ 15:12        Patient lying in bed; no complaints, afebrile    ROS unable to obtain secondary to patient medical condition     MEDICATIONS  (STANDING):  amLODIPine   Tablet 5 milliGRAM(s) Oral daily  cefepime  Injectable. 1000 milliGRAM(s) IV Push every 24 hours  influenza  Vaccine (HIGH DOSE) 0.7 milliLiter(s) IntraMuscular once  OLANZapine 5 milliGRAM(s) Oral daily  pantoprazole    Tablet 40 milliGRAM(s) Oral before breakfast  potassium chloride    Tablet ER 40 milliEquivalent(s) Oral once  potassium phosphate / sodium phosphate Powder (PHOS-NaK) 1 Packet(s) Oral three times a day with meals  sodium chloride 0.9%. 1000 milliLiter(s) (125 mL/Hr) IV Continuous <Continuous>    MEDICATIONS  (PRN):  acetaminophen     Tablet .. 650 milliGRAM(s) Oral every 6 hours PRN Temp greater or equal to 38C (100.4F), Mild Pain (1 - 3)      Vital Signs Last 24 Hrs  T(C): 36.6 (19 Sep 2022 08:42), Max: 37.1 (18 Sep 2022 17:05)  T(F): 97.9 (19 Sep 2022 08:42), Max: 98.8 (18 Sep 2022 17:05)  HR: 87 (19 Sep 2022 08:42) (87 - 103)  BP: 135/83 (19 Sep 2022 08:42) (106/81 - 135/83)  BP(mean): --  RR: 18 (19 Sep 2022 08:42) (18 - 18)  SpO2: 96% (19 Sep 2022 08:42) (96% - 97%)    Parameters below as of 18 Sep 2022 20:55  Patient On (Oxygen Delivery Method): room air            Physical Exam:            Constitutional: frail looking  HEENT: NC/AT, EOMI, PERRLA, conjunctivae clear; ears and nose atraumatic; pharynx clear  Neck: supple; thyroid not palpable  Back: no tenderness  Respiratory: respiratory effort normal; clear to auscultation  Cardiovascular: S1S2 regular, no murmurs  Abdomen: soft, not tender, not distended, positive BS; no liver or spleen organomegaly  Genitourinary: no suprapubic tenderness  Musculoskeletal: no muscle tenderness, no joint swelling or tenderness  Neurological/ Psychiatric: AxOx3, judgement and insight normal;  moving all extremities  Skin: no rashes; no palpable lesions    Labs: all available labs reviewed                     Labs:                        13.2   6.69  )-----------( 192      ( 19 Sep 2022 08:56 )             39.5     09-19    141  |  112<H>  |  12  ----------------------------<  109<H>  3.3<L>   |  22  |  0.95    Ca    9.1      19 Sep 2022 08:56  Phos  2.4     09-19  Mg     2.1     09-19    TPro  7.7  /  Alb  3.3  /  TBili  0.6  /  DBili  x   /  AST  80<H>  /  ALT  42  /  AlkPhos  79  09-19           Cultures:       Culture - Urine (collected 09-17-22 @ 20:08)  Source: Clean Catch None  Final Report (09-18-22 @ 21:16):    No growth    Culture - Blood (collected 09-17-22 @ 16:57)  Source: .Blood None  Preliminary Report (09-18-22 @ 22:02):    No growth to date.    Culture - Blood (collected 09-17-22 @ 16:57)  Source: .Blood None  Preliminary Report (09-18-22 @ 22:02):    No growth to date.      C-Reactive Protein, Serum: 160 mg/L (09-18-22 @ 07:34)        < from: CT Chest No Cont (09.17.22 @ 19:15) >    ACC: [id number] EXAM:  CT ABDOMEN AND PELVIS                        ACC: [id number] EXAM:  CT CHEST                          PROCEDURE DATE:  09/17/2022          INTERPRETATION:  CLINICAL INFORMATION: Fall    COMPARISON: None.    CONTRAST/COMPLICATIONS:  IV Contrast: NONE  Oral Contrast: NONE  Complications: None reported at time of study completion    PROCEDURE:  CT of the Chest, Abdomen and Pelvis was performed.  Sagittal and coronal reformats were performed.    FINDINGS:  CHEST:  LUNGS AND LARGEAIRWAYS: Patent central airways. Minimal biapical   paraseptal emphysema. No segmental consolidation.  PLEURA: No pleural effusion.  VESSELS: Atherosclerotic changes of the aorta and coronary vasculature.   No aortic aneurysm.  HEART: Heart size is normal. No pericardial effusion.  MEDIASTINUM AND MEHUL: No lymphadenopathy.  CHEST WALL AND LOWER NECK: Within normal limits.    ABDOMEN AND PELVIS:  LIVER: Within normal limits.  BILE DUCTS: Normal caliber.  GALLBLADDER: Within normal limits.  SPLEEN:Within normal limits.  PANCREAS: Within normal limits.  ADRENALS: Within normal limits.  KIDNEYS/URETERS: No hydronephrosis bilaterally. Posterior right midpole   exophytic renal cyst. No intrarenal calculi. The ureters are unremarkable.    BLADDER: Distended bladder.  REPRODUCTIVE ORGANS: Prostate within normal limits.    BOWEL: No bowel obstruction. Appendix is normal. Colonic diverticulosis.   Prior sigmoid resection.  PERITONEUM: No ascites.  VESSELS: Atherosclerotic changes.  RETROPERITONEUM/LYMPH NODES: No lymphadenopathy.  ABDOMINAL WALL: Within normal limits.  BONES: Prior anterior cervical spine fixation. Degenerative changes. No   definite displaced fractures.    IMPRESSION:  No definite evidence of solid visceral injury in the chest, abdomen or   pelvis.      < end of copied text >        Radiology: all available radiological tests reviewed    Advanced directives addressed: full resuscitation
CHIEF COMPLAINT: Rigors;Subjective fevers/weakness    SUBJECTIVE: Rigors resolved. No fevers. More energy. Denies dizziness, chest pain, SOB, nausea, vomiting, abdominal pain/discomfort    SIGNIFICANT INTERVAL EVENTS/OVERNIGHT EVENTS: None    Review of Systems: 14 Point review of systems reviewed and reported as negative unless otherwise stated in HPI    FROM H&P:  "71 y/o M w/ PMH of etoh abuse, dementia, schizophrenia, p/w rigors. Patient states that yesterday he took a shower and when he was drying himself he was shaking. Then he went to put his shoes on, and the shaking caused him to fall to his R side. Denies hitting head or any injuries. States that today he had similar symptoms again, where he started to shiver a lot, associated with subjective fevers. Patient also c/o generalized weakness and fatigue. Denies CP, SOB, cough, runny nose, sore throat, nausea, vomiting, abdominal pain, diarrhea, rash, ulcer, dysuria, increased urinary frequency, HA, neck stiffness, ear ache. "    PHYSICAL EXAM:    T(C): 36.6 (09-19-22 @ 08:42), Max: 37.1 (09-18-22 @ 17:05)  HR: 87 (09-19-22 @ 08:42) (87 - 113)  BP: 135/83 (09-19-22 @ 08:42) (106/81 - 135/83)  RR: 18 (09-19-22 @ 08:42) (18 - 18)  SpO2: 96% (09-19-22 @ 08:42) (96% - 98%)    General: AAOx3; NAD  Head: AT/NC  ENT: Moist Mucous Membranes; No Injury  Eyes: EOMI; PERRL  Neck: Non-tender; No JVD  CVS: RRR, S1&S2, No murmur, No edema  Respiratory: Lungs CTA B/L; Normal Respiratory Effort  Abdomen/GI: Soft, non-tender, non-distended, no guarding, no rebound, normal bowel sounds  : No bladder distention, No Abernathy  Extremities: No cyanosis, No clubbing, No edema  MSK: No CVA tenderness, Normal ROM, No injury  Neuro: AAOx3, CNII-XII grossly intact, non-focal  Psych: Appropriate, Cooperative, No depression, No anxiety  Skin: Clean, Dry and Intact      LABS:                          13.2   6.69  )-----------( 192      ( 19 Sep 2022 08:56 )             39.5     09-19    141  |  112<H>  |  12  ----------------------------<  109<H>  3.3<L>   |  22  |  0.95    Ca    9.1      19 Sep 2022 08:56  Phos  2.4     09-19  Mg     2.1     09-19    TPro  7.7  /  Alb  3.3  /  TBili  0.6  /  DBili  x   /  AST  80<H>  /  ALT  42  /  AlkPhos  79  09-19    SARS-CoV-2: NotDetec (17 Sep 2022 16:57)    CAPILLARY BLOOD GLUCOSE          Culture - Urine (collected 17 Sep 2022 20:08)  Source: Clean Catch None  Final Report (18 Sep 2022 21:16):    No growth    Culture - Blood (collected 17 Sep 2022 16:57)  Source: .Blood None  Preliminary Report (18 Sep 2022 22:02):    No growth to date.    Culture - Blood (collected 17 Sep 2022 16:57)  Source: .Blood None  Preliminary Report (18 Sep 2022 22:02):    No growth to date.                              12.2   9.42  )-----------( 171      ( 18 Sep 2022 07:34 )             36.0     09-18    141  |  109<H>  |  19  ----------------------------<  88  3.1<L>   |  25  |  1.04    Ca    9.0      18 Sep 2022 07:34  Phos  1.4     09-18  Mg     2.2     09-18    TPro  7.0  /  Alb  3.2<L>  /  TBili  0.6  /  DBili  x   /  AST  107<H>  /  ALT  44  /  AlkPhos  69  09-18    SARS-CoV-2: NotDetec (17 Sep 2022 16:57)    Free Thyroxine, Serum: 1.59 ng/dL (09.19.22 @ 08:56)Creatine Kinase, Serum: 993 U/L (09.19.22 @ 08:56)   Creatine Kinase, Serum: 1802 U/L (09.18.22 @ 15:27) CAPILLARY BLOOD GLUCOSE      Phosphorus Level, Serum: 1.4 mg/dL (09.18.22 @ 07:34)Sedimentation Rate, Erythrocyte: 28 mm/hr (09.18.22 @ 07:34) A1C with Estimated Average Glucose Result: 5.4:Troponin I, High Sensitivity Result: 95.05:Lactate, Blood: 1.2Troponin I, High Sensitivity Result: 121.40: Urinalysis (09.17.22 @ 20:07)   pH Urine: 8.0   Glucose Qualitative, Urine: Negative   Blood, Urine: Large   Color: Yellow   Urine Appearance: Clear   Bilirubin: Negative   Ketone - Urine: Negative   Specific Gravity: 1.010   Protein, Urine: 30 mg/dL   Urobilinogen: Negative   Nitrite: Negative   Leukocyte Esterase Concentration: Negative Troponin I, High Sensitivity Result: 76.05        RADIOLOGY:  < from: CT Chest No Cont (09.17.22 @ 19:15) >  IMPRESSION:  No definite evidence of solid visceral injury in the chest, abdomen or   pelvis.    < end of copied text >  < from: CT Head No Cont (09.17.22 @ 19:15) >  IMPRESSION:    CT HEAD: Mild to moderate periventricular white matter ischemia.    CT cervical spine:   No vertebral fracture is recognized. Mild   degenerative disc disease and spondylosis at C3-4 through C5-6 with loss   of disc height and associated degenerative endplate changes. There is   narrowing of the RIGHT C2-3, BILATERAL C3-4, RIGHT C4-5, RIGHT C5-C6   neural foramina due to uncovertebral spurring and facet osteophytic   hypertrophy. Degenerative cord impingement is seen at C4-5 due to   posterior osteophytic ridge/disc complex.    < end of copied text >      EKG:  < from: 12 Lead ECG (09.18.22 @ 07:53) >  Diagnosis Line Normal sinus rhythm  Normal ECG  When compared with ECG of 17-SEP-2022 16:39,  No significant change was found    < end of copied text >            I personally reviewed labs, imaging, ekg, orders and vitals.    Discussed case with:  [X]RN  [X]CM/SW  [X]Patient  []Family  []Specialist:

## 2022-09-20 NOTE — DISCHARGE NOTE PROVIDER - NSDCMRMEDTOKEN_GEN_ALL_CORE_FT
amLODIPine 5 mg oral tablet: 1 tab(s) orally once a day  OLANZapine 5 mg oral tablet: 1 tab(s) orally once a day  omeprazole 40 mg oral delayed release capsule: 1 cap(s) orally once a day

## 2022-09-20 NOTE — DISCHARGE NOTE PROVIDER - HOSPITAL COURSE
FROM H&P:    "71 y/o M w/ PMH of etoh abuse, dementia, schizophrenia, p/w rigors. Patient states that yesterday he took a shower and when he was drying himself he was shaking. Then he went to put his shoes on, and the shaking caused him to fall to his R side. Denies hitting head or any injuries. States that today he had similar symptoms again, where he started to shiver a lot, associated with subjective fevers. Patient also c/o generalized weakness and fatigue. Denies CP, SOB, cough, runny nose, sore throat, nausea, vomiting, abdominal pain, diarrhea, rash, ulcer, dysuria, increased urinary frequency, HA, neck stiffness, ear ache"    #SIRS (w/ Tachycardia, fever) with ALYSSA. No identifiable source of infection. Possibly from Rhabdo?  #Rhabdomyolysis  #ALYSSA  #Elevated Transaminases.   #Elevated Troponin due to supply/demand ischemia due to above  -Vanco / cefepime  -Blood/Urine Cultures (9/17): NGTD  -RVP / COVID19 negative  -UA negative  -CT chest / abdomen does not report any sources of infection  -ESR / CRP  elevated   -Lactic acidosis now resolved  -ID consult   -CPK elevated day after admission. Presentation could have been from rhabdomyolysis but unable to clinically determine without CPK on admission labs. Resolved with IVF. Unclear of etiology of Rhabdo. Fall with near syncope. Unlikely seizure as patient conscious and remembers all events.   -ASA  -Troponins elevated but no significant rise indicating  -Cardio consult  -Echo  -Tele monitoring   -EKG reviewed       *H/o Etoh abuse  -States he hasn't had any Etoh since 2006    *Renal cyst  -F/u outpatient     Psych Disorder NOS/Schizophrenia  -Continue home olanzapine.     Patient stable. No identifiable infection. Rhabdo, Cr and LFTs improved with IVF. Cultures negative. Discharge home with home care in stable condition and close outpatient follow up.    T(C): 36.9 (09-20-22 @ 08:11), Max: 36.9 (09-20-22 @ 08:11)  HR: 90 (09-20-22 @ 14:55) (85 - 90)  BP: 105/70 (09-20-22 @ 14:55) (105/70 - 148/91)  RR: 17 (09-20-22 @ 08:11) (17 - 18)  SpO2: 93% (09-20-22 @ 08:11) (93% - 96%)  General: AAOx3; NAD  Head: AT/NC  ENT: Moist Mucous Membranes; No Injury  CVS: RRR, S1&S2, No murmur, No edema  Respiratory: Lungs CTA B/L; Normal Respiratory Effort  Abdomen/GI: Soft, non-tender, non-distended, no guarding, no rebound, normal bowel sounds  Extremities: No cyanosis, No clubbing, No edema  MSK: No CVA tenderness, Normal ROM, No injury  Neuro: AAOx3, CNII-XII grossly intact, non-focal  Psych: Appropriate, Cooperative, No depression, No anxiety  Skin: Clean, Dry and Intact  Discharge Management: 37 minutes  Date of Discharge/Service: 9/20/2022

## 2022-09-20 NOTE — PROGRESS NOTE ADULT - ASSESSMENT
Sinus tachy  HTN  Possibly reactive . Pt with fever  PFO    Suggest:  HR/BP stable.   PFO on echo, normal LVEF.  Continue amlodipine.  CPK decreasing.     DC planning.

## 2022-09-20 NOTE — DISCHARGE NOTE NURSING/CASE MANAGEMENT/SOCIAL WORK - NSDCVIVACCINE_GEN_ALL_CORE_FT
influenza, injectable, quadrivalent, preservative free; 04-Sep-2019 12:23; Miryam Ma (RN); JLGOV; [id number] (Exp. Date: 30-Jun-2020); IntraMuscular; Deltoid Left.; 0.5 milliLiter(s); VIS (VIS Published: 15-Aug-2019, VIS Presented: 04-Sep-2019);

## 2022-09-20 NOTE — DISCHARGE NOTE PROVIDER - PROVIDER TOKENS
FREE:[LAST:[Primary Medical Doctor],PHONE:[(   )    -],FAX:[(   )    -],FOLLOWUP:[1 week],ESTABLISHEDPATIENT:[T]]

## 2022-09-20 NOTE — DISCHARGE NOTE NURSING/CASE MANAGEMENT/SOCIAL WORK - NSDCPEFALRISK_GEN_ALL_CORE
For information on Fall & Injury Prevention, visit: [email or web address] OR  [email or web address] OR  https://www.cdc.gov/steadi/patient.html

## 2022-09-20 NOTE — DISCHARGE NOTE PROVIDER - CARE PROVIDER_API CALL
Primary Medical Doctor,   Phone: (   )    -  Fax: (   )    -  Established Patient  Follow Up Time: 1 week

## 2022-09-20 NOTE — DISCHARGE NOTE NURSING/CASE MANAGEMENT/SOCIAL WORK - PATIENT PORTAL LINK FT
You can access the FollowMyHealth Patient Portal offered by [facility] by registering at the following website: [email or web address]. By joining [facility]’s FollowMyHealth portal, you will also be able to view your health information using other applications (apps) compatible with our system.

## 2022-09-20 NOTE — DISCHARGE NOTE PROVIDER - NSDCCPCAREPLAN_GEN_ALL_CORE_FT
PRINCIPAL DISCHARGE DIAGNOSIS  Diagnosis: Fever  Assessment and Plan of Treatment: Likely from muscle breakdown which also hurt the kidney and liver. This has improved with IV fluids. Continue to maintain adequate oral hydration and follow up  outpatient with primary medical doctor.      SECONDARY DISCHARGE DIAGNOSES  Diagnosis: Rigors  Assessment and Plan of Treatment:     Diagnosis: High serum lactate  Assessment and Plan of Treatment:

## 2022-09-22 LAB
CULTURE RESULTS: SIGNIFICANT CHANGE UP
CULTURE RESULTS: SIGNIFICANT CHANGE UP
SPECIMEN SOURCE: SIGNIFICANT CHANGE UP
SPECIMEN SOURCE: SIGNIFICANT CHANGE UP

## 2022-09-23 DIAGNOSIS — N28.1 CYST OF KIDNEY, ACQUIRED: ICD-10-CM

## 2022-09-23 DIAGNOSIS — R55 SYNCOPE AND COLLAPSE: ICD-10-CM

## 2022-09-23 DIAGNOSIS — F03.90 UNSPECIFIED DEMENTIA WITHOUT BEHAVIORAL DISTURBANCE: ICD-10-CM

## 2022-09-23 DIAGNOSIS — Q21.1 ATRIAL SEPTAL DEFECT: ICD-10-CM

## 2022-09-23 DIAGNOSIS — I10 ESSENTIAL (PRIMARY) HYPERTENSION: ICD-10-CM

## 2022-09-23 DIAGNOSIS — F20.9 SCHIZOPHRENIA, UNSPECIFIED: ICD-10-CM

## 2022-09-23 DIAGNOSIS — M62.82 RHABDOMYOLYSIS: ICD-10-CM

## 2022-09-23 DIAGNOSIS — N17.9 ACUTE KIDNEY FAILURE, UNSPECIFIED: ICD-10-CM

## 2022-09-23 DIAGNOSIS — E87.2 ACIDOSIS: ICD-10-CM

## 2022-09-23 DIAGNOSIS — E87.6 HYPOKALEMIA: ICD-10-CM

## 2022-09-23 DIAGNOSIS — I24.8 OTHER FORMS OF ACUTE ISCHEMIC HEART DISEASE: ICD-10-CM

## 2022-09-23 DIAGNOSIS — Z87.891 PERSONAL HISTORY OF NICOTINE DEPENDENCE: ICD-10-CM

## 2022-09-23 DIAGNOSIS — R65.11 SYSTEMIC INFLAMMATORY RESPONSE SYNDROME (SIRS) OF NON-INFECTIOUS ORIGIN WITH ACUTE ORGAN DYSFUNCTION: ICD-10-CM

## 2022-10-17 ENCOUNTER — INPATIENT (INPATIENT)
Facility: HOSPITAL | Age: 72
LOS: 3 days | Discharge: HOME | DRG: 372 | End: 2022-10-21
Attending: INTERNAL MEDICINE | Admitting: HOSPITALIST
Payer: MEDICARE

## 2022-10-17 VITALS
DIASTOLIC BLOOD PRESSURE: 78 MMHG | SYSTOLIC BLOOD PRESSURE: 104 MMHG | TEMPERATURE: 98 F | RESPIRATION RATE: 18 BRPM | HEART RATE: 81 BPM | WEIGHT: 123.9 LBS | HEIGHT: 61 IN | OXYGEN SATURATION: 97 %

## 2022-10-17 DIAGNOSIS — F03.90 UNSPECIFIED DEMENTIA WITHOUT BEHAVIORAL DISTURBANCE: ICD-10-CM

## 2022-10-17 DIAGNOSIS — F19.10 OTHER PSYCHOACTIVE SUBSTANCE ABUSE, UNCOMPLICATED: ICD-10-CM

## 2022-10-17 DIAGNOSIS — A04.4 OTHER INTESTINAL ESCHERICHIA COLI INFECTIONS: ICD-10-CM

## 2022-10-17 DIAGNOSIS — N17.9 ACUTE KIDNEY FAILURE, UNSPECIFIED: ICD-10-CM

## 2022-10-17 DIAGNOSIS — F10.10 ALCOHOL ABUSE, UNCOMPLICATED: ICD-10-CM

## 2022-10-17 DIAGNOSIS — K51.018 ULCERATIVE (CHRONIC) PANCOLITIS WITH OTHER COMPLICATION: ICD-10-CM

## 2022-10-17 DIAGNOSIS — I10 ESSENTIAL (PRIMARY) HYPERTENSION: ICD-10-CM

## 2022-10-17 DIAGNOSIS — F20.9 SCHIZOPHRENIA, UNSPECIFIED: ICD-10-CM

## 2022-10-17 DIAGNOSIS — E87.6 HYPOKALEMIA: ICD-10-CM

## 2022-10-17 DIAGNOSIS — K21.9 GASTRO-ESOPHAGEAL REFLUX DISEASE WITHOUT ESOPHAGITIS: ICD-10-CM

## 2022-10-17 DIAGNOSIS — B96.23 UNSPECIFIED SHIGA TOXIN-PRODUCING ESCHERICHIA COLI [E. COLI] [STEC] AS THE CAUSE OF DISEASES CLASSIFIED ELSEWHERE: ICD-10-CM

## 2022-10-17 DIAGNOSIS — K51.00 ULCERATIVE (CHRONIC) PANCOLITIS WITHOUT COMPLICATIONS: ICD-10-CM

## 2022-10-17 LAB
ALBUMIN SERPL ELPH-MCNC: 2.7 G/DL — LOW (ref 3.3–5)
ALP SERPL-CCNC: 89 U/L — SIGNIFICANT CHANGE UP (ref 40–120)
ALT FLD-CCNC: 12 U/L — SIGNIFICANT CHANGE UP (ref 12–78)
ANION GAP SERPL CALC-SCNC: 5 MMOL/L — SIGNIFICANT CHANGE UP (ref 5–17)
APPEARANCE UR: CLEAR — SIGNIFICANT CHANGE UP
AST SERPL-CCNC: 19 U/L — SIGNIFICANT CHANGE UP (ref 15–37)
BASOPHILS # BLD AUTO: 0.04 K/UL — SIGNIFICANT CHANGE UP (ref 0–0.2)
BASOPHILS NFR BLD AUTO: 0.3 % — SIGNIFICANT CHANGE UP (ref 0–2)
BILIRUB SERPL-MCNC: 0.3 MG/DL — SIGNIFICANT CHANGE UP (ref 0.2–1.2)
BILIRUB UR-MCNC: NEGATIVE — SIGNIFICANT CHANGE UP
BUN SERPL-MCNC: 23 MG/DL — SIGNIFICANT CHANGE UP (ref 7–23)
CALCIUM SERPL-MCNC: 8.4 MG/DL — LOW (ref 8.5–10.1)
CHLORIDE SERPL-SCNC: 101 MMOL/L — SIGNIFICANT CHANGE UP (ref 96–108)
CO2 SERPL-SCNC: 30 MMOL/L — SIGNIFICANT CHANGE UP (ref 22–31)
COLOR SPEC: YELLOW — SIGNIFICANT CHANGE UP
CREAT SERPL-MCNC: 1.46 MG/DL — HIGH (ref 0.5–1.3)
DIFF PNL FLD: ABNORMAL
E COLI SXT SPEC: DETECTED
EGFR: 50 ML/MIN/1.73M2 — LOW
EOSINOPHIL # BLD AUTO: 0.06 K/UL — SIGNIFICANT CHANGE UP (ref 0–0.5)
EOSINOPHIL NFR BLD AUTO: 0.5 % — SIGNIFICANT CHANGE UP (ref 0–6)
GI PCR PANEL: DETECTED
GLUCOSE SERPL-MCNC: 83 MG/DL — SIGNIFICANT CHANGE UP (ref 70–99)
GLUCOSE UR QL: NEGATIVE — SIGNIFICANT CHANGE UP
HCT VFR BLD CALC: 34.2 % — LOW (ref 39–50)
HGB BLD-MCNC: 11.2 G/DL — LOW (ref 13–17)
IMM GRANULOCYTES NFR BLD AUTO: 0.3 % — SIGNIFICANT CHANGE UP (ref 0–0.9)
KETONES UR-MCNC: ABNORMAL
LEUKOCYTE ESTERASE UR-ACNC: ABNORMAL
LIDOCAIN IGE QN: 42 U/L — LOW (ref 73–393)
LYMPHOCYTES # BLD AUTO: 0.88 K/UL — LOW (ref 1–3.3)
LYMPHOCYTES # BLD AUTO: 6.9 % — LOW (ref 13–44)
MCHC RBC-ENTMCNC: 28 PG — SIGNIFICANT CHANGE UP (ref 27–34)
MCHC RBC-ENTMCNC: 32.7 GM/DL — SIGNIFICANT CHANGE UP (ref 32–36)
MCV RBC AUTO: 85.5 FL — SIGNIFICANT CHANGE UP (ref 80–100)
MONOCYTES # BLD AUTO: 0.93 K/UL — HIGH (ref 0–0.9)
MONOCYTES NFR BLD AUTO: 7.3 % — SIGNIFICANT CHANGE UP (ref 2–14)
NEUTROPHILS # BLD AUTO: 10.87 K/UL — HIGH (ref 1.8–7.4)
NEUTROPHILS NFR BLD AUTO: 84.7 % — HIGH (ref 43–77)
NITRITE UR-MCNC: NEGATIVE — SIGNIFICANT CHANGE UP
PH UR: 8 — SIGNIFICANT CHANGE UP (ref 5–8)
PLATELET # BLD AUTO: 243 K/UL — SIGNIFICANT CHANGE UP (ref 150–400)
POTASSIUM SERPL-MCNC: 3.7 MMOL/L — SIGNIFICANT CHANGE UP (ref 3.5–5.3)
POTASSIUM SERPL-SCNC: 3.7 MMOL/L — SIGNIFICANT CHANGE UP (ref 3.5–5.3)
PROT SERPL-MCNC: 6.6 GM/DL — SIGNIFICANT CHANGE UP (ref 6–8.3)
PROT UR-MCNC: 30 MG/DL
RBC # BLD: 4 M/UL — LOW (ref 4.2–5.8)
RBC # FLD: 15 % — HIGH (ref 10.3–14.5)
SODIUM SERPL-SCNC: 136 MMOL/L — SIGNIFICANT CHANGE UP (ref 135–145)
SP GR SPEC: 1.01 — SIGNIFICANT CHANGE UP (ref 1.01–1.02)
UROBILINOGEN FLD QL: NEGATIVE — SIGNIFICANT CHANGE UP
WBC # BLD: 12.82 K/UL — HIGH (ref 3.8–10.5)
WBC # FLD AUTO: 12.82 K/UL — HIGH (ref 3.8–10.5)

## 2022-10-17 PROCEDURE — 87493 C DIFF AMPLIFIED PROBE: CPT

## 2022-10-17 PROCEDURE — 93005 ELECTROCARDIOGRAM TRACING: CPT

## 2022-10-17 PROCEDURE — G1004: CPT

## 2022-10-17 PROCEDURE — 85025 COMPLETE CBC W/AUTO DIFF WBC: CPT

## 2022-10-17 PROCEDURE — 36415 COLL VENOUS BLD VENIPUNCTURE: CPT

## 2022-10-17 PROCEDURE — 71045 X-RAY EXAM CHEST 1 VIEW: CPT | Mod: 26

## 2022-10-17 PROCEDURE — 85027 COMPLETE CBC AUTOMATED: CPT

## 2022-10-17 PROCEDURE — 99285 EMERGENCY DEPT VISIT HI MDM: CPT

## 2022-10-17 PROCEDURE — 94640 AIRWAY INHALATION TREATMENT: CPT

## 2022-10-17 PROCEDURE — 99223 1ST HOSP IP/OBS HIGH 75: CPT

## 2022-10-17 PROCEDURE — 87040 BLOOD CULTURE FOR BACTERIA: CPT

## 2022-10-17 PROCEDURE — 80048 BASIC METABOLIC PNL TOTAL CA: CPT

## 2022-10-17 PROCEDURE — 74177 CT ABD & PELVIS W/CONTRAST: CPT | Mod: 26,ME

## 2022-10-17 RX ORDER — OLANZAPINE 15 MG/1
5 TABLET, FILM COATED ORAL AT BEDTIME
Refills: 0 | Status: DISCONTINUED | OUTPATIENT
Start: 2022-10-17 | End: 2022-10-21

## 2022-10-17 RX ORDER — FLUTICASONE PROPIONATE 50 MCG
2 SPRAY, SUSPENSION NASAL
Refills: 0 | Status: DISCONTINUED | OUTPATIENT
Start: 2022-10-17 | End: 2022-10-21

## 2022-10-17 RX ORDER — METOPROLOL TARTRATE 50 MG
12.5 TABLET ORAL
Refills: 0 | Status: DISCONTINUED | OUTPATIENT
Start: 2022-10-17 | End: 2022-10-18

## 2022-10-17 RX ORDER — CIPROFLOXACIN LACTATE 400MG/40ML
400 VIAL (ML) INTRAVENOUS ONCE
Refills: 0 | Status: COMPLETED | OUTPATIENT
Start: 2022-10-17 | End: 2022-10-17

## 2022-10-17 RX ORDER — SODIUM CHLORIDE 9 MG/ML
500 INJECTION INTRAMUSCULAR; INTRAVENOUS; SUBCUTANEOUS ONCE
Refills: 0 | Status: COMPLETED | OUTPATIENT
Start: 2022-10-17 | End: 2022-10-17

## 2022-10-17 RX ORDER — INFLUENZA VIRUS VACCINE 15; 15; 15; 15 UG/.5ML; UG/.5ML; UG/.5ML; UG/.5ML
0.7 SUSPENSION INTRAMUSCULAR ONCE
Refills: 0 | Status: DISCONTINUED | OUTPATIENT
Start: 2022-10-17 | End: 2022-10-21

## 2022-10-17 RX ORDER — ONDANSETRON 8 MG/1
4 TABLET, FILM COATED ORAL ONCE
Refills: 0 | Status: COMPLETED | OUTPATIENT
Start: 2022-10-17 | End: 2022-10-17

## 2022-10-17 RX ORDER — ACETAMINOPHEN 500 MG
650 TABLET ORAL EVERY 6 HOURS
Refills: 0 | Status: DISCONTINUED | OUTPATIENT
Start: 2022-10-17 | End: 2022-10-21

## 2022-10-17 RX ORDER — FAMOTIDINE 10 MG/ML
20 INJECTION INTRAVENOUS
Refills: 0 | Status: DISCONTINUED | OUTPATIENT
Start: 2022-10-17 | End: 2022-10-21

## 2022-10-17 RX ORDER — AMLODIPINE BESYLATE 2.5 MG/1
5 TABLET ORAL DAILY
Refills: 0 | Status: DISCONTINUED | OUTPATIENT
Start: 2022-10-17 | End: 2022-10-18

## 2022-10-17 RX ORDER — SODIUM CHLORIDE 9 MG/ML
1000 INJECTION INTRAMUSCULAR; INTRAVENOUS; SUBCUTANEOUS
Refills: 0 | Status: DISCONTINUED | OUTPATIENT
Start: 2022-10-17 | End: 2022-10-19

## 2022-10-17 RX ORDER — OLANZAPINE 15 MG/1
1 TABLET, FILM COATED ORAL
Qty: 0 | Refills: 0 | DISCHARGE

## 2022-10-17 RX ORDER — VANCOMYCIN HCL 1 G
500 VIAL (EA) INTRAVENOUS EVERY 6 HOURS
Refills: 0 | Status: DISCONTINUED | OUTPATIENT
Start: 2022-10-17 | End: 2022-10-17

## 2022-10-17 RX ORDER — FAMOTIDINE 10 MG/ML
20 INJECTION INTRAVENOUS ONCE
Refills: 0 | Status: COMPLETED | OUTPATIENT
Start: 2022-10-17 | End: 2022-10-17

## 2022-10-17 RX ORDER — METRONIDAZOLE 500 MG
500 TABLET ORAL ONCE
Refills: 0 | Status: COMPLETED | OUTPATIENT
Start: 2022-10-17 | End: 2022-10-17

## 2022-10-17 RX ADMIN — SODIUM CHLORIDE 125 MILLILITER(S): 9 INJECTION INTRAMUSCULAR; INTRAVENOUS; SUBCUTANEOUS at 19:48

## 2022-10-17 RX ADMIN — Medication 200 MILLIGRAM(S): at 16:13

## 2022-10-17 RX ADMIN — FAMOTIDINE 20 MILLIGRAM(S): 10 INJECTION INTRAVENOUS at 11:24

## 2022-10-17 RX ADMIN — SODIUM CHLORIDE 500 MILLILITER(S): 9 INJECTION INTRAMUSCULAR; INTRAVENOUS; SUBCUTANEOUS at 14:49

## 2022-10-17 RX ADMIN — SODIUM CHLORIDE 500 MILLILITER(S): 9 INJECTION INTRAMUSCULAR; INTRAVENOUS; SUBCUTANEOUS at 11:24

## 2022-10-17 RX ADMIN — Medication 100 MILLIGRAM(S): at 14:49

## 2022-10-17 RX ADMIN — FAMOTIDINE 20 MILLIGRAM(S): 10 INJECTION INTRAVENOUS at 23:48

## 2022-10-17 RX ADMIN — ONDANSETRON 4 MILLIGRAM(S): 8 TABLET, FILM COATED ORAL at 11:23

## 2022-10-17 NOTE — ED PROVIDER NOTE - SKIN, MLM
Skin normal color for race, warm, dry and intact. No evidence of rash. Old surgical scar to RLQ. Skin normal color for race, warm, dry and intact. No evidence of rash. Old surgical scar to RLQ, + well-healed.

## 2022-10-17 NOTE — H&P ADULT - ASSESSMENT
72M w/PMH HTN, schizophrenia, GERD, recent admit for fall and weakness (9/17-9/20), found to have fever/tachy, treated w/ IV abx although no source found, TODAY presents via EMS c/o 4days of abd pain w/ watery diarrhea.    In ED, CT a/p +pancolitis, wbc 12, Cr 1.4 (prior 0.8), given iv cipro, flagyl, pepcid, ivf.  +guaiac     #Pancolitis, suspect c diff given recent admit and abx use  - will start po vanco  - ID cs  - check gi pcr, c diff  -monitor vitals, am labs  - CLD and ADAT  - pepcid    #ALYSSA, 2/2 GI loss d/t diarrhea  - IVF  - check am labs    #schizophrenia  - stable  - c/w olanzopine     #PPX- SCDs, (reportedly guaiac pos)

## 2022-10-17 NOTE — ED PROVIDER NOTE - CONSTITUTIONAL, MLM
normal... Well appearing, awake, alert, oriented to person, place, time/situation and in no apparent distress. Well appearing elderly HM, awake, alert, oriented to person, place, time/situation and in no apparent distress.

## 2022-10-17 NOTE — PATIENT PROFILE ADULT - FUNCTIONAL ASSESSMENT - BASIC MOBILITY 6.
4-calculated by average/Not able to assess (calculate score using [facility] averaging method)

## 2022-10-17 NOTE — H&P ADULT - HISTORY OF PRESENT ILLNESS
HPI:  72M w/PMH HTN, schizophrenia, GERD, recent admit for fall and weakness (-), found to have fever/tachy, treated w/ IV abx although no source found, TODAY presents via EMS c/o 4days of abd pain w/ watery diarrhea.  Pain is lower abdomen, R>L, constant, 3/10, no e/a factors. Watery diarrhea, worse at night, waking him up from sleep w/ 3-5 episodes per night.  He tried taking pepto bismol w/ only mild relief.  Denies any similar prior episodes.  Denies n/v, hemtochezia, melena, hematemesis,  f/c, cp, sob, dysuria.    In ED, CT a/p +pancolitis, wbc 12, Cr 1.4 (prior 0.8), given iv cipro, flagyl, pepcid, ivf.  +guaiac       PAST MEDICAL & SURGICAL HISTORY:  HTN  GERD  Alcohol abuse  Drug abuse  Schizophrenia  Dementia  No significant past surgical history    Review of Systems:   CONSTITUTIONAL: No fever.  EYES: No eye pain or discharge.  ENMT:  No sinus or throat pain  NECK: No pain or stiffness  RESPIRATORY: No cough, wheezing, chills or hemoptysis; No shortness of breath  CARDIOVASCULAR: No chest pain, palpitations, dizziness, or leg swelling  GASTROINTESTINAL: see hpi   GENITOURINARY: No dysuria or incontinence  NEUROLOGICAL: No headaches, memory loss, loss of strength, numbness, or tremors  SKIN: No rashes.  MUSCULOSKELETAL: No joint pain or swelling; No muscle, back, or extremity pain  PSYCHIATRIC: No depression, anxiety, mood swings, or difficulty sleeping    Social History:   resides w/ friends  denies smoking/etoh/drug use  ind ADLs    FAMILY HISTORY:  unknown to this elderly man    MEDICATIONS  (STANDING):  amLODIPine   Tablet 5 milliGRAM(s) Oral daily  famotidine Injectable 20 milliGRAM(s) IV Push two times a day  fluticasone propionate 50 MICROgram(s)/spray Nasal Spray 2 Spray(s) Both Nostrils two times a day  metoprolol tartrate 12.5 milliGRAM(s) Oral two times a day  OLANZapine 5 milliGRAM(s) Oral at bedtime  sodium chloride 0.9%. 1000 milliLiter(s) (125 mL/Hr) IV Continuous <Continuous>  vancomycin    Solution 500 milliGRAM(s) Oral every 6 hours    MEDICATIONS  (PRN):  acetaminophen     Tablet .. 650 milliGRAM(s) Oral every 6 hours PRN Temp greater or equal to 38C (100.4F), Mild Pain (1 - 3)    PHYSICAL EXAM:  Vital Signs Last 24 Hrs  T(C): 36.4 (17 Oct 2022 09:22), Max: 36.7 (17 Oct 2022 08:15)  T(F): 97.5 (17 Oct 2022 09:22), Max: 98 (17 Oct 2022 08:15)  HR: 59 (17 Oct 2022 09:22) (59 - 81)  BP: 159/79 (17 Oct 2022 09:22) (104/78 - 159/79)  BP(mean): --  RR: 18 (17 Oct 2022 09:22) (18 - 18)  SpO2: 99% (17 Oct 2022 09:22) (97% - 99%)  Parameters below as of 17 Oct 2022 09:22  Patient On (Oxygen Delivery Method): room air  GENERAL: NAD,   HEAD:  Atraumatic, Normocephalic  EYES: EOMI, PERRLA, conjunctiva and sclera clear  ENT: normal hearing, no nasal discharge, throat clear, dentition normal  NECK: Supple, No JVD, no LAD, no thyromegaly   CHEST/LUNG: Clear to auscultation bilaterally; No wheeze, respirations unlabored  HEART: Regular rate and rhythm; No murmurs, rubs, or gallops  ABDOMEN: Soft, MILD TTP RLQ, Nondistended; HYPOACTIVE Bowel sounds, no HSM  EXTREMITIES:  2+ Peripheral Pulses, No clubbing, cyanosis, or edema  PSYCH: AAOx3, normal behavior  NEUROLOGY: non-focal, sensory and cn 2-12 intact, speech/language intact  SKIN: No visible rashes or lesions    LABS:                        11.2   12.82 )-----------( 243      ( 17 Oct 2022 11:25 )             34.2     10-    136  |  101  |  23  ----------------------------<  83  3.7   |  30  |  1.46<H>    Ca    8.4<L>      17 Oct 2022 11:25    TPro  6.6  /  Alb  2.7<L>  /  TBili  0.3  /  DBili  x   /  AST  19  /  ALT  12  /  AlkPhos  89  10-17          Urinalysis Basic - ( 17 Oct 2022 08:31 )    Color: Yellow / Appearance: Clear / S.010 / pH: x  Gluc: x / Ketone: Moderate  / Bili: Negative / Urobili: Negative   Blood: x / Protein: 30 mg/dL / Nitrite: Negative   Leuk Esterase: Trace / RBC: 3-5 /HPF / WBC 0-2   Sq Epi: x / Non Sq Epi: Few / Bacteria: Occasional        RADIOLOGY & ADDITIONAL TESTS:    Imaging Personally Reviewed:  CT A/P- pancolitis    EKG Personally Reviewed:  n/a  Consultant(s) Notes Reviewed:      Care Discussed with Consultants/Other Providers:  ED

## 2022-10-17 NOTE — ED PROVIDER NOTE - CLINICAL SUMMARY MEDICAL DECISION MAKING FREE TEXT BOX
73 y/o  male +HTN ambulatory to ED c/o 3-4 days diffuse abd discomfort, waxing/waning, worse in RLE, assoc diarrhea but no n/v or fever. Decreased appetite but no anorexia +RLQ tenderness, +RLQ old abd scar, nature or surgery unclear. Plan: CXR, CT abd/pelvis, labs including lipase, stool guaiac (pt reports diarrhea is dark), IV Zofran, observe, and reassess. 71 y/o  male +HTN ambulatory to ED c/o 3-4 days diffuse abd discomfort, waxing/waning, worse in RLE, assoc diarrhea but no n/v or fever. Decreased appetite but no anorexia +RLQ tenderness, +RLQ old abd scar, nature of surgery unclear.   Plan: CXR, CT abd/pelvis, labs including lipase, stool guaiac (pt reports diarrhea is dark), IV Zofran, observe, and reassess.

## 2022-10-17 NOTE — ED PROVIDER NOTE - PROGRESS NOTE DETAILS
Retention Suture Text: Retention sutures were placed to support the closure and prevent dehiscence. Bakari Castillo for attending Dr. Khan:  Guaiac test. Lot 231. Exp 06/30/2023. Guaiac positive. QC passed. Not melena ricardo Khan MD:  Dr. Colunga aware of Med admission.

## 2022-10-17 NOTE — PATIENT PROFILE ADULT - FALL HARM RISK - HARM RISK INTERVENTIONS

## 2022-10-17 NOTE — ED ADULT NURSE REASSESSMENT NOTE - NS ED NURSE REASSESS COMMENT FT1
Pt resting comfortably in bed with no acute distress noted. Pt updated on their status, the current plan of care, and available results no needs or requests at this time. Call bell within reach. Will continue to monitor/reassess.   Cdif/precautions maintained

## 2022-10-17 NOTE — ED ADULT NURSE NOTE - OBJECTIVE STATEMENT
p/w diarrhea  x4 days. occasional cramping. c/o some nausea w/o vomiting. no other complaints. pt. does not report eating any new/raw foods. On omeprazole 40mg x 1 year, recent change in BP medications. lisinopril switched to metoprolol. recent Endoscopy and colonoscopy -[facility]- last year. denies sick contact. denies recent abx use.

## 2022-10-17 NOTE — ED PROVIDER NOTE - OBJECTIVE STATEMENT
71 y/o male w/ a PMHx of dementia, schizophrenia, drug abuse, HTN, and EtOH abuse presents to the ED c/o diarrhea x4 days and intermittent abd pain x few weeks. Pt states abd pain is diffuse but worse on right and worse after eating. Pt describes diarrhea as dark stool, unsure of blood, last BM this AM. Denies CP, SOB, sick contacts, or recent travel. Pt also c/o decreased appetite. Pt endorses taking Pepto Bismol w/ slight improvement in bowels. Pt mother had some type of stomach cancer. Pt had an upper endoscopy this July which was normal, also had a normal colonoscopy last year. GI: Rufino Bruce. PCP: Dr. Veliz. 71 y/o male w/ a PMHx of dementia, schizophrenia, drug abuse, HTN, and EtOH abuse BIBA to the ED c/o diarrhea x4 days and intermittent abd pain x few weeks. Pt states abd pain is diffuse but worse on right and worse after eating. Pt describes diarrhea as dark stool, unsure of blood, last BM this AM. Denies CP, SOB, sick contacts, or recent travel. Pt also c/o decreased appetite. Pt endorses taking Pepto Bismol w/ slight improvement in bowels. Pt mother had some type of stomach cancer. Pt had an upper endoscopy this July which was normal, also had a normal colonoscopy last year. GI: Rufino Bruce. PCP: Dr. Veliz. 73 y/o male w/ a PMHx of dementia, schizophrenia, drug abuse, HTN, and EtOH abuse BIBA to the ED c/o diarrhea x4 days and intermittent abd pain x few weeks. Pt states abd pain is diffuse but worse on right and worse after eating. Pt describes diarrhea as very loose dark stools & frequent, unsure of blood, last BM this AM. Denies CP, SOB, sick contacts, or recent travel. Pt also c/o decreased appetite. Pt endorses taking Pepto Bismol w/ slight improvement at best in bowels. Pt mother had some type of stomach cancer. Pt had an upper endoscopy this July which was normal, also had a normal colonoscopy last year.   GI: Rufino Bruce. PCP: Dr. Veliz.

## 2022-10-18 LAB
ANION GAP SERPL CALC-SCNC: 10 MMOL/L — SIGNIFICANT CHANGE UP (ref 5–17)
BASOPHILS # BLD AUTO: 0.07 K/UL — SIGNIFICANT CHANGE UP (ref 0–0.2)
BASOPHILS NFR BLD AUTO: 0.7 % — SIGNIFICANT CHANGE UP (ref 0–2)
BUN SERPL-MCNC: 16 MG/DL — SIGNIFICANT CHANGE UP (ref 7–23)
C DIFF BY PCR RESULT: SIGNIFICANT CHANGE UP
C DIFF TOX GENS STL QL NAA+PROBE: SIGNIFICANT CHANGE UP
CALCIUM SERPL-MCNC: 8.3 MG/DL — LOW (ref 8.5–10.1)
CHLORIDE SERPL-SCNC: 109 MMOL/L — HIGH (ref 96–108)
CO2 SERPL-SCNC: 23 MMOL/L — SIGNIFICANT CHANGE UP (ref 22–31)
CREAT SERPL-MCNC: 1.03 MG/DL — SIGNIFICANT CHANGE UP (ref 0.5–1.3)
CULTURE RESULTS: SIGNIFICANT CHANGE UP
EGFR: 77 ML/MIN/1.73M2 — SIGNIFICANT CHANGE UP
EOSINOPHIL # BLD AUTO: 0.19 K/UL — SIGNIFICANT CHANGE UP (ref 0–0.5)
EOSINOPHIL NFR BLD AUTO: 1.9 % — SIGNIFICANT CHANGE UP (ref 0–6)
GLUCOSE SERPL-MCNC: 86 MG/DL — SIGNIFICANT CHANGE UP (ref 70–99)
HCT VFR BLD CALC: 32.9 % — LOW (ref 39–50)
HGB BLD-MCNC: 10.6 G/DL — LOW (ref 13–17)
IMM GRANULOCYTES NFR BLD AUTO: 0.5 % — SIGNIFICANT CHANGE UP (ref 0–0.9)
LYMPHOCYTES # BLD AUTO: 1.27 K/UL — SIGNIFICANT CHANGE UP (ref 1–3.3)
LYMPHOCYTES # BLD AUTO: 12.8 % — LOW (ref 13–44)
MCHC RBC-ENTMCNC: 28 PG — SIGNIFICANT CHANGE UP (ref 27–34)
MCHC RBC-ENTMCNC: 32.2 GM/DL — SIGNIFICANT CHANGE UP (ref 32–36)
MCV RBC AUTO: 86.8 FL — SIGNIFICANT CHANGE UP (ref 80–100)
MONOCYTES # BLD AUTO: 0.65 K/UL — SIGNIFICANT CHANGE UP (ref 0–0.9)
MONOCYTES NFR BLD AUTO: 6.6 % — SIGNIFICANT CHANGE UP (ref 2–14)
NEUTROPHILS # BLD AUTO: 7.68 K/UL — HIGH (ref 1.8–7.4)
NEUTROPHILS NFR BLD AUTO: 77.5 % — HIGH (ref 43–77)
PLATELET # BLD AUTO: 256 K/UL — SIGNIFICANT CHANGE UP (ref 150–400)
POTASSIUM SERPL-MCNC: 3 MMOL/L — LOW (ref 3.5–5.3)
POTASSIUM SERPL-SCNC: 3 MMOL/L — LOW (ref 3.5–5.3)
RBC # BLD: 3.79 M/UL — LOW (ref 4.2–5.8)
RBC # FLD: 15.3 % — HIGH (ref 10.3–14.5)
SODIUM SERPL-SCNC: 142 MMOL/L — SIGNIFICANT CHANGE UP (ref 135–145)
SPECIMEN SOURCE: SIGNIFICANT CHANGE UP
WBC # BLD: 9.91 K/UL — SIGNIFICANT CHANGE UP (ref 3.8–10.5)
WBC # FLD AUTO: 9.91 K/UL — SIGNIFICANT CHANGE UP (ref 3.8–10.5)

## 2022-10-18 PROCEDURE — 99232 SBSQ HOSP IP/OBS MODERATE 35: CPT

## 2022-10-18 PROCEDURE — 93010 ELECTROCARDIOGRAM REPORT: CPT

## 2022-10-18 RX ORDER — POTASSIUM CHLORIDE 20 MEQ
40 PACKET (EA) ORAL EVERY 4 HOURS
Refills: 0 | Status: COMPLETED | OUTPATIENT
Start: 2022-10-18 | End: 2022-10-18

## 2022-10-18 RX ADMIN — Medication 40 MILLIEQUIVALENT(S): at 17:04

## 2022-10-18 RX ADMIN — Medication 2 SPRAY(S): at 05:44

## 2022-10-18 RX ADMIN — Medication 40 MILLIEQUIVALENT(S): at 13:06

## 2022-10-18 RX ADMIN — FAMOTIDINE 20 MILLIGRAM(S): 10 INJECTION INTRAVENOUS at 21:09

## 2022-10-18 RX ADMIN — FAMOTIDINE 20 MILLIGRAM(S): 10 INJECTION INTRAVENOUS at 09:11

## 2022-10-18 RX ADMIN — SODIUM CHLORIDE 125 MILLILITER(S): 9 INJECTION INTRAMUSCULAR; INTRAVENOUS; SUBCUTANEOUS at 09:12

## 2022-10-18 RX ADMIN — SODIUM CHLORIDE 125 MILLILITER(S): 9 INJECTION INTRAMUSCULAR; INTRAVENOUS; SUBCUTANEOUS at 21:08

## 2022-10-18 RX ADMIN — OLANZAPINE 5 MILLIGRAM(S): 15 TABLET, FILM COATED ORAL at 00:13

## 2022-10-18 RX ADMIN — OLANZAPINE 5 MILLIGRAM(S): 15 TABLET, FILM COATED ORAL at 21:08

## 2022-10-18 RX ADMIN — Medication 2 SPRAY(S): at 17:09

## 2022-10-18 NOTE — CONSULT NOTE ADULT - ASSESSMENT
72M w/PMH HTN, schizophrenia, GERD, recent admit for fall and weakness (9/17-9/20), found to have fever/tachy, treated w/ IV abx although no source found, presents via EMS c/o 4days of abd pain w/ watery diarrhea.  Pain is lower abdomen, R>L, constant, 3/10, no e/a factors. Watery diarrhea, worse at night, waking him up from sleep w/ 3-5 episodes per night.  He tried taking pepto bismol w/ only mild relief.  Denies any similar prior episodes.  Denies n/v, hemtochezia, melena, hematemesis,  f/c, cp, sob, dysuria. In ED, CT a/p +pancolitis, wbc 12, Cr 1.4 (prior 0.8), given iv cipro, flagyl, pepcid, ivf.  +guaiac. Imaging shows pancolitis, GI pcr remarkable for STEC.    1. fever. pancolitis. diarrheal syndrome with STEC  - f/u c diff pcr  - IV hydration, supportive care  - antibiotics are not indicated with STEC as can precipitate HUS  - contact precautions  - monitor temps  - fu cbc    2. other issues - care per medicine

## 2022-10-18 NOTE — CONSULT NOTE ADULT - SUBJECTIVE AND OBJECTIVE BOX
Patient is a 72y old  Male who presents with a chief complaint of abd pain, diarrhea (18 Oct 2022 14:50)    HPI:  72M w/PMH HTN, schizophrenia, GERD, recent admit for fall and weakness (-), found to have fever/tachy, treated w/ IV abx although no source found, presents via EMS c/o 4days of abd pain w/ watery diarrhea.  Pain is lower abdomen, R>L, constant, 3/10, no e/a factors. Watery diarrhea, worse at night, waking him up from sleep w/ 3-5 episodes per night.  He tried taking pepto bismol w/ only mild relief.  Denies any similar prior episodes.  Denies n/v, hemtochezia, melena, hematemesis,  f/c, cp, sob, dysuria. In ED, CT a/p +pancolitis, wbc 12, Cr 1.4 (prior 0.8), given iv cipro, flagyl, pepcid, ivf.  +guaiac. Imaging shows pancolitis, GI pcr remarkable for STEC.      PAST MEDICAL & SURGICAL HISTORY:  HTN  GERD  Alcohol abuse  Drug abuse  Schizophrenia  Dementia  No significant past surgical history    PMH: as above  PSH: as above  Meds: per reconciliation sheet, noted below  MEDICATIONS  (STANDING):  famotidine Injectable 20 milliGRAM(s) IV Push two times a day  fluticasone propionate 50 MICROgram(s)/spray Nasal Spray 2 Spray(s) Both Nostrils two times a day  influenza  Vaccine (HIGH DOSE) 0.7 milliLiter(s) IntraMuscular once  OLANZapine 5 milliGRAM(s) Oral at bedtime  potassium chloride    Tablet ER 40 milliEquivalent(s) Oral every 4 hours  sodium chloride 0.9%. 1000 milliLiter(s) (125 mL/Hr) IV Continuous <Continuous>    Allergies    No Known Allergies    Intolerances      Social: no smoking, no alcohol, no illegal drugs; no recent travel, no exposure to TB  FAMILY HISTORY:     no history of premature cardiovascular disease in first degree relatives    ROS: + fever,  chills, no HA, no dizziness, no sore throat, no blurry vision, no CP, no palpitations, no SOB, no cough, no abdominal pain, + diarrhea, no N/V, no dysuria, no leg pain, no claudication, no rash, no joint aches, no rectal pain or bleeding, no night sweats + bloody diarrhea    All other systems reviewed and are negative    Vital Signs Last 24 Hrs  T(C): 36.8 (18 Oct 2022 09:06), Max: 36.8 (17 Oct 2022 21:33)  T(F): 98.2 (18 Oct 2022 09:06), Max: 98.3 (17 Oct 2022 21:33)  HR: 65 (18 Oct 2022 09:06) (65 - 85)  BP: 104/61 (18 Oct 2022 09:06) (99/42 - 107/61)  BP(mean): 73 (17 Oct 2022 21:33) (73 - 73)  RR: 18 (18 Oct 2022 09:06) (18 - 18)  SpO2: 99% (18 Oct 2022 09:06) (97% - 99%)    Parameters below as of 17 Oct 2022 23:34  Patient On (Oxygen Delivery Method): room air      Daily     Daily     PE:  Constitutional: frail looking  HEENT: NC/AT, EOMI, PERRLA, conjunctivae clear; ears and nose atraumatic; pharynx benign  Neck: supple; thyroid not palpable  Back: no tenderness  Respiratory: respiratory effort normal; clear to auscultation  Cardiovascular: S1S2 regular, no murmurs  Abdomen: soft, not tender, not distended, positive BS; liver and spleen WNL  Genitourinary: no suprapubic tenderness  Lymphatic: no LN palpable  Musculoskeletal: no muscle tenderness, no joint swelling or tenderness  Extremities: no pedal edema  Neurological/ Psychiatric: AxOx3, Judgement and insight normal;  moving all extremities  Skin: no rashes; no palpable lesions    Labs: all available labs reviewed                        10.6   9.91  )-----------( 256      ( 18 Oct 2022 08:11 )             32.9     10-18    142  |  109<H>  |  16  ----------------------------<  86  3.0<L>   |  23  |  1.03    Ca    8.3<L>      18 Oct 2022 08:11    TPro  6.6  /  Alb  2.7<L>  /  TBili  0.3  /  DBili  x   /  AST  19  /  ALT  12  /  AlkPhos  89  10-17     LIVER FUNCTIONS - ( 17 Oct 2022 11:25 )  Alb: 2.7 g/dL / Pro: 6.6 gm/dL / ALK PHOS: 89 U/L / ALT: 12 U/L / AST: 19 U/L / GGT: x           Urinalysis Basic - ( 17 Oct 2022 08:31 )    Color: Yellow / Appearance: Clear / S.010 / pH: x  Gluc: x / Ketone: Moderate  / Bili: Negative / Urobili: Negative   Blood: x / Protein: 30 mg/dL / Nitrite: Negative   Leuk Esterase: Trace / RBC: 3-5 /HPF / WBC 0-2   Sq Epi: x / Non Sq Epi: Few / Bacteria: Occasional          Radiology: all available radiological tests reviewed  < from: CT Abdomen and Pelvis w/ IV Cont (10.17.22 @ 11:03) >  ACC: [id number] EXAM:  CT ABDOMEN AND PELVIS IC                          PROCEDURE DATE:  10/17/2022          INTERPRETATION:  CLINICAL INFORMATION: Acute abdominal pain    COMPARISON: 2022    CONTRAST/COMPLICATIONS:  IV Contrast: Omnipaque [id number] cc administered   10 cc discarded  Oral Contrast: NONE  Complications: None reported at time of study completion    PROCEDURE:  CT of the Abdomen and Pelvis was performed.  Sagittal and coronal reformats were performed.    FINDINGS:  LOWER CHEST: Within normal limits.    LIVER: Within normal limits.  BILE DUCTS: Normal caliber.  GALLBLADDER: Within normal limits.  SPLEEN: Within normal limits.  PANCREAS: Within normal limits.  ADRENALS: Within normal limits.  KIDNEYS/URETERS: Kidneys enhance bilaterally and symmetrically without   hydronephrosis. Subcentimeter hypodensities bilaterally to small to   characterize. Indeterminate posterior right mid to upper pole renal   lesion measuring 1.2 cm, unchanged in size from prior exam. The ureters   are unremarkable.    BLADDER: Within normal limits.  REPRODUCTIVE ORGANS: Trace to small free fluid in the pelvis. Prostate is   within normal limits.    BOWEL: Marked pancolonic thickening particularly the right colon with   pericolonic infiltrative changes and fluid in the right paracolic gutter   compatible with pancolitis differential including infectious versus   inflammatory etiology with consideration for C. Difficile colitis.   Appendix is mildly fluid dilated however this is likely secondary to the   pancolitis. No bowel obstruction.  PERITONEUM: Small ascites.  VESSELS: Atherosclerotic changes.  RETROPERITONEUM/LYMPH NODES: No lymphadenopathy.  ABDOMINAL WALL: Within normal limits.  BONES: Degenerative changes.    IMPRESSION:  Pancolitis more prominent on the right colon differential including   infectious versus inflammatory etiology with consideration for C.   Difficile colitis. No bowel obstruction.    Indeterminate posterior right upper pole 1.2 cm renal lesion.    --- End of Report ---    < end of copied text >    Advanced directives addressed: full resuscitation

## 2022-10-19 LAB
ANION GAP SERPL CALC-SCNC: 6 MMOL/L — SIGNIFICANT CHANGE UP (ref 5–17)
BUN SERPL-MCNC: 10 MG/DL — SIGNIFICANT CHANGE UP (ref 7–23)
CALCIUM SERPL-MCNC: 9.3 MG/DL — SIGNIFICANT CHANGE UP (ref 8.5–10.1)
CHLORIDE SERPL-SCNC: 115 MMOL/L — HIGH (ref 96–108)
CO2 SERPL-SCNC: 23 MMOL/L — SIGNIFICANT CHANGE UP (ref 22–31)
CREAT SERPL-MCNC: 1.23 MG/DL — SIGNIFICANT CHANGE UP (ref 0.5–1.3)
EGFR: 62 ML/MIN/1.73M2 — SIGNIFICANT CHANGE UP
GLUCOSE SERPL-MCNC: 136 MG/DL — HIGH (ref 70–99)
HCT VFR BLD CALC: 34.6 % — LOW (ref 39–50)
HGB BLD-MCNC: 11 G/DL — LOW (ref 13–17)
MCHC RBC-ENTMCNC: 27.5 PG — SIGNIFICANT CHANGE UP (ref 27–34)
MCHC RBC-ENTMCNC: 31.8 GM/DL — LOW (ref 32–36)
MCV RBC AUTO: 86.5 FL — SIGNIFICANT CHANGE UP (ref 80–100)
PLATELET # BLD AUTO: 330 K/UL — SIGNIFICANT CHANGE UP (ref 150–400)
POTASSIUM SERPL-MCNC: 3.5 MMOL/L — SIGNIFICANT CHANGE UP (ref 3.5–5.3)
POTASSIUM SERPL-SCNC: 3.5 MMOL/L — SIGNIFICANT CHANGE UP (ref 3.5–5.3)
RBC # BLD: 4 M/UL — LOW (ref 4.2–5.8)
RBC # FLD: 15.5 % — HIGH (ref 10.3–14.5)
SODIUM SERPL-SCNC: 144 MMOL/L — SIGNIFICANT CHANGE UP (ref 135–145)
WBC # BLD: 10.02 K/UL — SIGNIFICANT CHANGE UP (ref 3.8–10.5)
WBC # FLD AUTO: 10.02 K/UL — SIGNIFICANT CHANGE UP (ref 3.8–10.5)

## 2022-10-19 PROCEDURE — 99232 SBSQ HOSP IP/OBS MODERATE 35: CPT

## 2022-10-19 RX ORDER — SODIUM CHLORIDE 9 MG/ML
1000 INJECTION INTRAMUSCULAR; INTRAVENOUS; SUBCUTANEOUS
Refills: 0 | Status: DISCONTINUED | OUTPATIENT
Start: 2022-10-19 | End: 2022-10-19

## 2022-10-19 RX ADMIN — SODIUM CHLORIDE 75 MILLILITER(S): 9 INJECTION INTRAMUSCULAR; INTRAVENOUS; SUBCUTANEOUS at 11:10

## 2022-10-19 RX ADMIN — Medication 650 MILLIGRAM(S): at 10:33

## 2022-10-19 RX ADMIN — OLANZAPINE 5 MILLIGRAM(S): 15 TABLET, FILM COATED ORAL at 21:27

## 2022-10-19 RX ADMIN — Medication 2 SPRAY(S): at 18:20

## 2022-10-19 RX ADMIN — FAMOTIDINE 20 MILLIGRAM(S): 10 INJECTION INTRAVENOUS at 10:04

## 2022-10-19 RX ADMIN — Medication 2 SPRAY(S): at 05:53

## 2022-10-19 RX ADMIN — FAMOTIDINE 20 MILLIGRAM(S): 10 INJECTION INTRAVENOUS at 21:27

## 2022-10-19 RX ADMIN — Medication 650 MILLIGRAM(S): at 10:03

## 2022-10-19 NOTE — PROVIDER CONTACT NOTE (OTHER) - SITUATION
notified Dr Veliz's office of admission please fax over discharge paperwork once patient is discharged to [phone]

## 2022-10-20 PROCEDURE — 99232 SBSQ HOSP IP/OBS MODERATE 35: CPT

## 2022-10-20 RX ADMIN — Medication 650 MILLIGRAM(S): at 09:37

## 2022-10-20 RX ADMIN — FAMOTIDINE 20 MILLIGRAM(S): 10 INJECTION INTRAVENOUS at 09:36

## 2022-10-20 RX ADMIN — OLANZAPINE 5 MILLIGRAM(S): 15 TABLET, FILM COATED ORAL at 21:16

## 2022-10-20 RX ADMIN — Medication 2 SPRAY(S): at 17:26

## 2022-10-20 RX ADMIN — Medication 2 SPRAY(S): at 05:45

## 2022-10-20 RX ADMIN — Medication 650 MILLIGRAM(S): at 10:07

## 2022-10-20 RX ADMIN — FAMOTIDINE 20 MILLIGRAM(S): 10 INJECTION INTRAVENOUS at 21:16

## 2022-10-20 NOTE — PROGRESS NOTE ADULT - ASSESSMENT
72M w/PMH HTN, schizophrenia, GERD, recent admit for fall and weakness (9/17-9/20), found to have fever/tachy, treated w/ IV abx although no source found, TODAY presents via EMS c/o 4days of abd pain w/ watery diarrhea.    In ED, CT a/p +pancolitis, wbc 12, Cr 1.4 (prior 0.8), given iv cipro, flagyl, pepcid, ivf.  +guaiac     #Pancolitis, due to shiga like toxin producing e-coli   - s/p PO Vanco , s/p IV Cipro and Flagyl   - ID cs  - c.diff negative. + ecoli   - monitor vitals, am labs  - advanced diet as tolerated   - Pepcid daily     # acute hypokalemia   - due to diarrhea   - replete and repeat in AM labs     #ALYSSA, 2/2 GI loss d/t diarrhea   - improving   - off IV fluids encouraged PO hydration   - trend in AM labs     #schizophrenia  - stable  - c/w olanzopine     #PPX- SCDs , OOB 
72M w/PMH HTN, schizophrenia, GERD, recent admit for fall and weakness (9/17-9/20), found to have fever/tachy, treated w/ IV abx although no source found, TODAY presents via EMS c/o 4days of abd pain w/ watery diarrhea.    In ED, CT a/p +pancolitis, wbc 12, Cr 1.4 (prior 0.8), given iv cipro, flagyl, pepcid, ivf.  +guaiac     #Pancolitis, due to shiga like toxin producing e-coli   - s/p PO Vanco , s/p IV Cipro and Flagyl   - ID cs  - c.diff negative. + ecoli   - monitor vitals, am labs  - full liquid diet   - + diarrhea   - Pepcid daily     # acute hypokalemia   - due to diarrhea   - replete and repeat in AM labs     #ALYSSA, 2/2 GI loss d/t diarrhea   - improving   - off IV fluids encouraged PO hydration   - trend in AM labs     #schizophrenia  - stable  - c/w olanzopine     #PPX- SCDs , OOB 
72M w/PMH HTN, schizophrenia, GERD, recent admit for fall and weakness (9/17-9/20), found to have fever/tachy, treated w/ IV abx although no source found, presents via EMS c/o 4days of abd pain w/ watery diarrhea.  Pain is lower abdomen, R>L, constant, 3/10, no e/a factors. Watery diarrhea, worse at night, waking him up from sleep w/ 3-5 episodes per night.  He tried taking pepto bismol w/ only mild relief.  Denies any similar prior episodes.  Denies n/v, hemtochezia, melena, hematemesis,  f/c, cp, sob, dysuria. In ED, CT a/p +pancolitis, wbc 12, Cr 1.4 (prior 0.8), given iv cipro, flagyl, pepcid, ivf.  +guaiac. Imaging shows pancolitis, GI pcr remarkable for STEC.    1. fever. pancolitis. diarrheal syndrome with STEC  - slowly improving  - c diff pcr (-)   - IV hydration, supportive care  - antibiotics are not indicated with STEC as can precipitate HUS  - contact precautions  - monitor temps  - fu cbc    2. other issues - care per medicine 
72M w/PMH HTN, schizophrenia, GERD, recent admit for fall and weakness (9/17-9/20), found to have fever/tachy, treated w/ IV abx although no source found, TODAY presents via EMS c/o 4days of abd pain w/ watery diarrhea.    In ED, CT a/p +pancolitis, wbc 12, Cr 1.4 (prior 0.8), given iv cipro, flagyl, pepcid, ivf.  +guaiac     #Pancolitis, due to shiga like toxin producing e-coli   - s/p PO Vanco , s/p IV Cipro and Flagyl   - ID cs  - check gi pcr, c diff  -monitor vitals, am labs  - advanced diet as tolerated   - pepcid daily     # acute hypokalemia   - due to diarrhea   - replete and repeat in AM labs     #ALYSSA, 2/2 GI loss d/t diarrhea   - improving   - IVF   - trend in AM labs     #schizophrenia  - stable  - c/w olanzopine     #PPX- SCDs , OOB 
72M w/PMH HTN, schizophrenia, GERD, recent admit for fall and weakness (9/17-9/20), found to have fever/tachy, treated w/ IV abx although no source found, presents via EMS c/o 4days of abd pain w/ watery diarrhea.  Pain is lower abdomen, R>L, constant, 3/10, no e/a factors. Watery diarrhea, worse at night, waking him up from sleep w/ 3-5 episodes per night.  He tried taking pepto bismol w/ only mild relief.  Denies any similar prior episodes.  Denies n/v, hemtochezia, melena, hematemesis,  f/c, cp, sob, dysuria. In ED, CT a/p +pancolitis, wbc 12, Cr 1.4 (prior 0.8), given iv cipro, flagyl, pepcid, ivf.  +guaiac. Imaging shows pancolitis, GI pcr remarkable for STEC.    1. fever. pancolitis. diarrheal syndrome with STEC  - slowly improving  - c diff pcr (-)   - IV hydration, supportive care  - antibiotics are not indicated with STEC as can precipitate HUS  - contact precautions  - monitor temps  - fu cbc    2. other issues - care per medicine

## 2022-10-20 NOTE — PROGRESS NOTE ADULT - SUBJECTIVE AND OBJECTIVE BOX
Patient is a 72y old  Male who presents with a chief complaint of abd pain, diarrhea (17 Oct 2022 18:12)      SUBJECTIVE:   HPI:  HPI:  72M w/PMH HTN, schizophrenia, GERD, recent admit for fall and weakness (9/17-9/20), found to have fever/tachy, treated w/ IV abx although no source found, TODAY presents via EMS c/o 4days of abd pain w/ watery diarrhea.  Pain is lower abdomen, R>L, constant, 3/10, no e/a factors. Watery diarrhea, worse at night, waking him up from sleep w/ 3-5 episodes per night.  He tried taking pepto bismol w/ only mild relief.  Denies any similar prior episodes.  Denies n/v, hemtochezia, melena, hematemesis,  f/c, cp, sob, dysuria.    In ED, CT a/p +pancolitis, wbc 12, Cr 1.4 (prior 0.8), given iv cipro, flagyl, pepcid, ivf.  +guaiac       1018: standing up in room s/p BM , states it is more formed. no abd pain or nausea at present           REVIEW OF SYSTEMS:    CONSTITUTIONAL: No weakness, fevers or chills  EYES/ENT: No visual changes;  No vertigo or throat pain   NECK: No pain or stiffness  RESPIRATORY: No cough, wheezing, hemoptysis; No shortness of breath  CARDIOVASCULAR: No chest pain or palpitations  GASTROINTESTINAL: No abdominal or epigastric pain. No nausea, vomiting, or hematemesis; No diarrhea or constipation. No melena or hematochezia.  GENITOURINARY: No dysuria, frequency or hematuria  NEUROLOGICAL: No numbness or weakness  SKIN: No itching, burning, rashes, or lesions   All other review of systems is negative unless indicated above      Weight (kg): 55 (10-17 @ 23:34)    Vital Signs Last 24 Hrs  T(C): 36.8 (18 Oct 2022 09:06), Max: 36.8 (17 Oct 2022 21:33)  T(F): 98.2 (18 Oct 2022 09:06), Max: 98.3 (17 Oct 2022 21:33)  HR: 65 (18 Oct 2022 09:06) (65 - 85)  BP: 104/61 (18 Oct 2022 09:06) (99/42 - 107/61)  BP(mean): 73 (17 Oct 2022 21:33) (73 - 73)  RR: 18 (18 Oct 2022 09:06) (18 - 18)  SpO2: 99% (18 Oct 2022 09:06) (97% - 99%)    Parameters below as of 17 Oct 2022 23:34  Patient On (Oxygen Delivery Method): room air      PHYSICAL EXAM:    Constitutional: NAD, awake and alert, well-developed  HEENT: PERR, EOMI, Normal Hearing, MMM  Neck: Soft and supple, No LAD, No JVD  Respiratory: Breath sounds are clear bilaterally, No wheezing, rales or rhonchi  Cardiovascular: S1 and S2, regular rate and rhythm, no Murmurs, gallops or rubs  Gastrointestinal: Bowel Sounds present, soft, nontender, nondistended, no guarding, no rebound  Extremities: No peripheral edema  Vascular: 2+ peripheral pulses  Neurological: A/O x 3, no focal deficits  Musculoskeletal: 5/5 strength b/l upper and lower extremities  Skin: No rashes    MEDICATIONS:  MEDICATIONS  (STANDING):  famotidine Injectable 20 milliGRAM(s) IV Push two times a day  fluticasone propionate 50 MICROgram(s)/spray Nasal Spray 2 Spray(s) Both Nostrils two times a day  influenza  Vaccine (HIGH DOSE) 0.7 milliLiter(s) IntraMuscular once  OLANZapine 5 milliGRAM(s) Oral at bedtime  potassium chloride    Tablet ER 40 milliEquivalent(s) Oral every 4 hours  sodium chloride 0.9%. 1000 milliLiter(s) (125 mL/Hr) IV Continuous <Continuous>      LABS: All Labs Reviewed:                        10.6   9.91  )-----------( 256      ( 18 Oct 2022 08:11 )             32.9     10-18    142  |  109<H>  |  16  ----------------------------<  86  3.0<L>   |  23  |  1.03    Ca    8.3<L>      18 Oct 2022 08:11    TPro  6.6  /  Alb  2.7<L>  /  TBili  0.3  /  DBili  x   /  AST  19  /  ALT  12  /  AlkPhos  89  10-17              
Date of service: 10-20-22 @ 12:52    pt seen and examined  feels better  stools more formed  denies abd pain  on full liquids     ROS: no fever or chills; denies dizziness, no HA, no SOB or cough, no abdominal pain, no constipation; no dysuria, no urinary frequency, no legs pain, no rashes      MEDICATIONS  (STANDING):  famotidine Injectable 20 milliGRAM(s) IV Push two times a day  fluticasone propionate 50 MICROgram(s)/spray Nasal Spray 2 Spray(s) Both Nostrils two times a day  influenza  Vaccine (HIGH DOSE) 0.7 milliLiter(s) IntraMuscular once  OLANZapine 5 milliGRAM(s) Oral at bedtime    Vital Signs Last 24 Hrs  T(C): 36.9 (20 Oct 2022 08:35), Max: 36.9 (20 Oct 2022 08:35)  T(F): 98.4 (20 Oct 2022 08:35), Max: 98.4 (20 Oct 2022 08:35)  HR: 68 (20 Oct 2022 08:35) (67 - 88)  BP: 132/62 (20 Oct 2022 08:35) (125/78 - 146/78)  BP(mean): --  RR: 18 (20 Oct 2022 08:35) (17 - 18)  SpO2: 100% (20 Oct 2022 08:35) (97% - 100%)    Parameters below as of 20 Oct 2022 08:35  Patient On (Oxygen Delivery Method): room air    PE:  Constitutional: frail looking  HEENT: NC/AT, EOMI, PERRLA, conjunctivae clear; ears and nose atraumatic; pharynx benign  Neck: supple; thyroid not palpable  Back: no tenderness  Respiratory: respiratory effort normal; clear to auscultation  Cardiovascular: S1S2 regular, no murmurs  Abdomen: soft, not tender, not distended, positive BS; liver and spleen WNL  Genitourinary: no suprapubic tenderness  Lymphatic: no LN palpable  Musculoskeletal: no muscle tenderness, no joint swelling or tenderness  Extremities: no pedal edema  Neurological/ Psychiatric: AxOx3, Judgement and insight normal;  moving all extremities  Skin: no rashes; no palpable lesions    Labs: all available labs reviewed                                   11.0   10.02 )-----------( 330      ( 19 Oct 2022 09:33 )             34.6     10    144  |  115<H>  |  10  ----------------------------<  136<H>  3.5   |  23  |  1.23    Ca    9.3      19 Oct 2022 09:33      Urinalysis Basic - ( 17 Oct 2022 08:31 )    Color: Yellow / Appearance: Clear / S.010 / pH: x  Gluc: x / Ketone: Moderate  / Bili: Negative / Urobili: Negative   Blood: x / Protein: 30 mg/dL / Nitrite: Negative   Leuk Esterase: Trace / RBC: 3-5 /HPF / WBC 0-2   Sq Epi: x / Non Sq Epi: Few / Bacteria: Occasional          Radiology: all available radiological tests reviewed  < from: CT Abdomen and Pelvis w/ IV Cont (10.17.22 @ 11:03) >  ACC: [id number] EXAM:  CT ABDOMEN AND PELVIS IC                          PROCEDURE DATE:  10/17/2022          INTERPRETATION:  CLINICAL INFORMATION: Acute abdominal pain    COMPARISON: 2022    CONTRAST/COMPLICATIONS:  IV Contrast: Omnipaque [id number] cc administered   10 cc discarded  Oral Contrast: NONE  Complications: None reported at time of study completion    PROCEDURE:  CT of the Abdomen and Pelvis was performed.  Sagittal and coronal reformats were performed.    FINDINGS:  LOWER CHEST: Within normal limits.    LIVER: Within normal limits.  BILE DUCTS: Normal caliber.  GALLBLADDER: Within normal limits.  SPLEEN: Within normal limits.  PANCREAS: Within normal limits.  ADRENALS: Within normal limits.  KIDNEYS/URETERS: Kidneys enhance bilaterally and symmetrically without   hydronephrosis. Subcentimeter hypodensities bilaterally to small to   characterize. Indeterminate posterior right mid to upper pole renal   lesion measuring 1.2 cm, unchanged in size from prior exam. The ureters   are unremarkable.    BLADDER: Within normal limits.  REPRODUCTIVE ORGANS: Trace to small free fluid in the pelvis. Prostate is   within normal limits.    BOWEL: Marked pancolonic thickening particularly the right colon with   pericolonic infiltrative changes and fluid in the right paracolic gutter   compatible with pancolitis differential including infectious versus   inflammatory etiology with consideration for C. Difficile colitis.   Appendix is mildly fluid dilated however this is likely secondary to the   pancolitis. No bowel obstruction.  PERITONEUM: Small ascites.  VESSELS: Atherosclerotic changes.  RETROPERITONEUM/LYMPH NODES: No lymphadenopathy.  ABDOMINAL WALL: Within normal limits.  BONES: Degenerative changes.    IMPRESSION:  Pancolitis more prominent on the right colon differential including   infectious versus inflammatory etiology with consideration for C.   Difficile colitis. No bowel obstruction.    Indeterminate posterior right upper pole 1.2 cm renal lesion.    --- End of Report ---    < end of copied text >    Advanced directives addressed: full resuscitation
Date of service: 10-19-22 @ 12:37    pt seen and examined  feels better  stools more formed  less frequency diarrhea    ROS: no fever or chills; denies dizziness, no HA, no SOB or cough, no abdominal pain, no  constipation; no dysuria, no urinary frequency, no legs pain, no rashes    MEDICATIONS  (STANDING):  famotidine Injectable 20 milliGRAM(s) IV Push two times a day  fluticasone propionate 50 MICROgram(s)/spray Nasal Spray 2 Spray(s) Both Nostrils two times a day  influenza  Vaccine (HIGH DOSE) 0.7 milliLiter(s) IntraMuscular once  OLANZapine 5 milliGRAM(s) Oral at bedtime      Vital Signs Last 24 Hrs  T(C): 37.4 (19 Oct 2022 07:53), Max: 37.4 (19 Oct 2022 07:53)  T(F): 99.3 (19 Oct 2022 07:53), Max: 99.3 (19 Oct 2022 07:53)  HR: 91 (19 Oct 2022 07:53) (77 - 92)  BP: 117/65 (19 Oct 2022 07:53) (114/59 - 129/69)  BP(mean): --  RR: 18 (19 Oct 2022 07:53) (18 - 18)  SpO2: 98% (19 Oct 2022 07:53) (93% - 99%)    Parameters below as of 19 Oct 2022 07:53  Patient On (Oxygen Delivery Method): room air            PE:  Constitutional: frail looking  HEENT: NC/AT, EOMI, PERRLA, conjunctivae clear; ears and nose atraumatic; pharynx benign  Neck: supple; thyroid not palpable  Back: no tenderness  Respiratory: respiratory effort normal; clear to auscultation  Cardiovascular: S1S2 regular, no murmurs  Abdomen: soft, not tender, not distended, positive BS; liver and spleen WNL  Genitourinary: no suprapubic tenderness  Lymphatic: no LN palpable  Musculoskeletal: no muscle tenderness, no joint swelling or tenderness  Extremities: no pedal edema  Neurological/ Psychiatric: AxOx3, Judgement and insight normal;  moving all extremities  Skin: no rashes; no palpable lesions    Labs: all available labs reviewed                                   11.0   10.02 )-----------( 330      ( 19 Oct 2022 09:33 )             34.6     10-19    144  |  115<H>  |  10  ----------------------------<  136<H>  3.5   |  23  |  1.23    Ca    9.3      19 Oct 2022 09:33          Urinalysis Basic - ( 17 Oct 2022 08:31 )    Color: Yellow / Appearance: Clear / S.010 / pH: x  Gluc: x / Ketone: Moderate  / Bili: Negative / Urobili: Negative   Blood: x / Protein: 30 mg/dL / Nitrite: Negative   Leuk Esterase: Trace / RBC: 3-5 /HPF / WBC 0-2   Sq Epi: x / Non Sq Epi: Few / Bacteria: Occasional          Radiology: all available radiological tests reviewed  < from: CT Abdomen and Pelvis w/ IV Cont (10.17.22 @ 11:03) >  ACC: [id number] EXAM:  CT ABDOMEN AND PELVIS IC                          PROCEDURE DATE:  10/17/2022          INTERPRETATION:  CLINICAL INFORMATION: Acute abdominal pain    COMPARISON: 2022    CONTRAST/COMPLICATIONS:  IV Contrast: Omnipaque [id number] cc administered   10 cc discarded  Oral Contrast: NONE  Complications: None reported at time of study completion    PROCEDURE:  CT of the Abdomen and Pelvis was performed.  Sagittal and coronal reformats were performed.    FINDINGS:  LOWER CHEST: Within normal limits.    LIVER: Within normal limits.  BILE DUCTS: Normal caliber.  GALLBLADDER: Within normal limits.  SPLEEN: Within normal limits.  PANCREAS: Within normal limits.  ADRENALS: Within normal limits.  KIDNEYS/URETERS: Kidneys enhance bilaterally and symmetrically without   hydronephrosis. Subcentimeter hypodensities bilaterally to small to   characterize. Indeterminate posterior right mid to upper pole renal   lesion measuring 1.2 cm, unchanged in size from prior exam. The ureters   are unremarkable.    BLADDER: Within normal limits.  REPRODUCTIVE ORGANS: Trace to small free fluid in the pelvis. Prostate is   within normal limits.    BOWEL: Marked pancolonic thickening particularly the right colon with   pericolonic infiltrative changes and fluid in the right paracolic gutter   compatible with pancolitis differential including infectious versus   inflammatory etiology with consideration for C. Difficile colitis.   Appendix is mildly fluid dilated however this is likely secondary to the   pancolitis. No bowel obstruction.  PERITONEUM: Small ascites.  VESSELS: Atherosclerotic changes.  RETROPERITONEUM/LYMPH NODES: No lymphadenopathy.  ABDOMINAL WALL: Within normal limits.  BONES: Degenerative changes.    IMPRESSION:  Pancolitis more prominent on the right colon differential including   infectious versus inflammatory etiology with consideration for C.   Difficile colitis. No bowel obstruction.    Indeterminate posterior right upper pole 1.2 cm renal lesion.    --- End of Report ---    < end of copied text >    Advanced directives addressed: full resuscitation
Patient is a 72y old  Male who presents with a chief complaint of abd pain, diarrhea (17 Oct 2022 18:12)      SUBJECTIVE:   HPI:  HPI:  72M w/PMH HTN, schizophrenia, GERD, recent admit for fall and weakness (9/17-9/20), found to have fever/tachy, treated w/ IV abx although no source found, TODAY presents via EMS c/o 4days of abd pain w/ watery diarrhea.  Pain is lower abdomen, R>L, constant, 3/10, no e/a factors. Watery diarrhea, worse at night, waking him up from sleep w/ 3-5 episodes per night.  He tried taking pepto bismol w/ only mild relief.  Denies any similar prior episodes.  Denies n/v, hemtochezia, melena, hematemesis,  f/c, cp, sob, dysuria.    In ED, CT a/p +pancolitis, wbc 12, Cr 1.4 (prior 0.8), given iv cipro, flagyl, pepcid, ivf.  +guaiac       1018: standing up in room s/p BM , states it is more formed. no abd pain or nausea at present   10/19: pacing in room, still had diarrhea this morning. pulled out IV         REVIEW OF SYSTEMS:    CONSTITUTIONAL: No weakness, fevers or chills  EYES/ENT: No visual changes;  No vertigo or throat pain   NECK: No pain or stiffness  RESPIRATORY: No cough, wheezing, hemoptysis; No shortness of breath  CARDIOVASCULAR: No chest pain or palpitations  GASTROINTESTINAL: No abdominal or epigastric pain. No nausea, vomiting, or hematemesis; No diarrhea or constipation. No melena or hematochezia.  GENITOURINARY: No dysuria, frequency or hematuria  NEUROLOGICAL: No numbness or weakness  SKIN: No itching, burning, rashes, or lesions   All other review of systems is negative unless indicated above    Vital Signs Last 24 Hrs  T(C): 37.4 (19 Oct 2022 07:53), Max: 37.4 (19 Oct 2022 07:53)  T(F): 99.3 (19 Oct 2022 07:53), Max: 99.3 (19 Oct 2022 07:53)  HR: 91 (19 Oct 2022 07:53) (77 - 92)  BP: 117/65 (19 Oct 2022 07:53) (114/59 - 129/69)  BP(mean): --  RR: 18 (19 Oct 2022 07:53) (18 - 18)  SpO2: 98% (19 Oct 2022 07:53) (93% - 99%)    Parameters below as of 19 Oct 2022 07:53  Patient On (Oxygen Delivery Method): room air          PHYSICAL EXAM:    Constitutional: NAD, awake and alert, well-developed, disheveled appearing   HEENT: PERR, EOMI, Normal Hearing, MMM  Neck: Soft and supple, No LAD, No JVD  Respiratory: Breath sounds are clear bilaterally, No wheezing, rales or rhonchi  Cardiovascular: S1 and S2, regular rate and rhythm, no Murmurs, gallops or rubs  Gastrointestinal: Bowel Sounds present, soft, nontender, nondistended, no guarding, no rebound  Extremities: No peripheral edema  Vascular: 2+ peripheral pulses  Neurological: A/O x 3, no focal deficits  Musculoskeletal: 5/5 strength b/l upper and lower extremities  Skin: No rashes      MEDICATIONS  (STANDING):  famotidine Injectable 20 milliGRAM(s) IV Push two times a day  fluticasone propionate 50 MICROgram(s)/spray Nasal Spray 2 Spray(s) Both Nostrils two times a day  influenza  Vaccine (HIGH DOSE) 0.7 milliLiter(s) IntraMuscular once  OLANZapine 5 milliGRAM(s) Oral at bedtime        LABS: All Labs Reviewed:                                       11.0   10.02 )-----------( 330      ( 19 Oct 2022 09:33 )             34.6     10-19    144  |  115<H>  |  10  ----------------------------<  136<H>  3.5   |  23  |  1.23    Ca    9.3      19 Oct 2022 09:33          
Patient is a 72y old  Male who presents with a chief complaint of abd pain, diarrhea (17 Oct 2022 18:12)      SUBJECTIVE:   HPI:  HPI:  72M w/PMH HTN, schizophrenia, GERD, recent admit for fall and weakness (9/17-9/20), found to have fever/tachy, treated w/ IV abx although no source found, TODAY presents via EMS c/o 4days of abd pain w/ watery diarrhea.  Pain is lower abdomen, R>L, constant, 3/10, no e/a factors. Watery diarrhea, worse at night, waking him up from sleep w/ 3-5 episodes per night.  He tried taking pepto bismol w/ only mild relief.  Denies any similar prior episodes.  Denies n/v, hemtochezia, melena, hematemesis,  f/c, cp, sob, dysuria.    In ED, CT a/p +pancolitis, wbc 12, Cr 1.4 (prior 0.8), given iv cipro, flagyl, pepcid, ivf.  +guaiac       1018: standing up in room s/p BM , states it is more formed. no abd pain or nausea at present   10/19: pacing in room, still had diarrhea this morning. pulled out IV   10/20: had diarrhea x 2 after eating full liquid diet for lunch. + abd cramping         REVIEW OF SYSTEMS:    CONSTITUTIONAL: No weakness, fevers or chills  EYES/ENT: No visual changes;  No vertigo or throat pain   NECK: No pain or stiffness  RESPIRATORY: No cough, wheezing, hemoptysis; No shortness of breath  CARDIOVASCULAR: No chest pain or palpitations  GASTROINTESTINAL: No abdominal or epigastric pain. No nausea, vomiting, or hematemesis; + diarrhea no constipation. No melena or hematochezia.  GENITOURINARY: No dysuria, frequency or hematuria  NEUROLOGICAL: No numbness or weakness  SKIN: No itching, burning, rashes, or lesions   All other review of systems is negative unless indicated above    Vital Signs Last 24 Hrs  T(C): 36.9 (20 Oct 2022 08:35), Max: 36.9 (20 Oct 2022 08:35)  T(F): 98.4 (20 Oct 2022 08:35), Max: 98.4 (20 Oct 2022 08:35)  HR: 68 (20 Oct 2022 08:35) (67 - 88)  BP: 132/62 (20 Oct 2022 08:35) (125/78 - 146/78)  BP(mean): --  RR: 18 (20 Oct 2022 08:35) (17 - 18)  SpO2: 100% (20 Oct 2022 08:35) (97% - 100%)    Parameters below as of 20 Oct 2022 08:35  Patient On (Oxygen Delivery Method): room air          PHYSICAL EXAM:    Constitutional: NAD, awake and alert, frail and disheveled appearing   HEENT: PERR, EOMI, Normal Hearing, MMM  Neck: Soft and supple, No LAD, No JVD  Respiratory: Breath sounds are clear bilaterally, No wheezing, rales or rhonchi  Cardiovascular: S1 and S2, regular rate and rhythm, no Murmurs, gallops or rubs  Gastrointestinal: Bowel Sounds present, soft, nontender, nondistended, no guarding, no rebound  Extremities: No peripheral edema  Vascular: 2+ peripheral pulses  Neurological: A/O x 3, no focal deficits  Musculoskeletal: 5/5 strength b/l upper and lower extremities  Skin: No rashes      MEDICATIONS  (STANDING):  famotidine Injectable 20 milliGRAM(s) IV Push two times a day  fluticasone propionate 50 MICROgram(s)/spray Nasal Spray 2 Spray(s) Both Nostrils two times a day  influenza  Vaccine (HIGH DOSE) 0.7 milliLiter(s) IntraMuscular once  OLANZapine 5 milliGRAM(s) Oral at bedtime        LABS: All Labs Reviewed:                                       11.0   10.02 )-----------( 330      ( 19 Oct 2022 09:33 )             34.6     10-19    144  |  115<H>  |  10  ----------------------------<  136<H>  3.5   |  23  |  1.23    Ca    9.3      19 Oct 2022 09:33

## 2022-10-20 NOTE — ED BEHAVIORAL HEALTH ASSESSMENT NOTE - FAMILY HISTORY OF MEDICAL ILLNESS
[FreeTextEntry1] : Entered by Elise Hawkins, acting as scribe for Dr. Sadiq Block.\par The documentation recorded by the scribe accurately reflects the service I personally performed and the decisions made by me.  None known

## 2022-10-21 ENCOUNTER — TRANSCRIPTION ENCOUNTER (OUTPATIENT)
Age: 72
End: 2022-10-21

## 2022-10-21 VITALS
OXYGEN SATURATION: 98 % | SYSTOLIC BLOOD PRESSURE: 136 MMHG | RESPIRATION RATE: 18 BRPM | DIASTOLIC BLOOD PRESSURE: 51 MMHG | HEART RATE: 71 BPM | TEMPERATURE: 97 F

## 2022-10-21 PROCEDURE — 99239 HOSP IP/OBS DSCHRG MGMT >30: CPT

## 2022-10-21 RX ADMIN — FAMOTIDINE 20 MILLIGRAM(S): 10 INJECTION INTRAVENOUS at 09:49

## 2022-10-21 RX ADMIN — Medication 2 SPRAY(S): at 05:54

## 2022-10-21 NOTE — DISCHARGE NOTE PROVIDER - HOSPITAL COURSE
HPI:  HPI:  72M w/PMH HTN, schizophrenia, GERD, recent admit for fall and weakness (9/17-9/20), found to have fever/tachy, treated w/ IV abx although no source found, TODAY presents via EMS c/o 4days of abd pain w/ watery diarrhea.  Pain is lower abdomen, R>L, constant, 3/10, no e/a factors. Watery diarrhea, worse at night, waking him up from sleep w/ 3-5 episodes per night.  He tried taking pepto bismol w/ only mild relief.  Denies any similar prior episodes.  Denies n/v, hemtochezia, melena, hematemesis,  f/c, cp, sob, dysuria.    In ED, CT a/p +pancolitis, wbc 12, Cr 1.4 (prior 0.8), given iv cipro, flagyl, pepcid, ivf.  +guaiac     pt admitted for acute diarrhea CT scan with pancolitis. GI PCR positive for shiga like toxin e.coli - negative for other pathogens. blood culture and urine culture negative. pt was evaled by ID Dr Santiago - sandhya dc'd due to concerns for possible HUS. pt with alyssa due to prerenal losses. pt with hypokalemia which was corrected. pt diet was advanced from clear liquids to regular diet without increasing bowel movements. pt was provided for supportive care. no other events during hospitalization.    see progress note for PE, vitals, results of lab and imaging     A&P   #Pancolitis, due to shiga like toxin producing e-coli   - s/p PO Vanco , s/p IV Cipro and Flagyl   - ID cs no other interventions   - c.diff negative. + ecoli   - reusme regular diet upon dc   - + diarrhea self limiting   - Pepcid daily     # acute hypokalemia   - due to diarrhea   - repleted and repeat in AM labs     #ALYSSA, 2/2 GI loss d/t diarrhea   - resolved   - off IV fluids encouraged PO hydration     #schizophrenia  - stable  - c/w olanzopine     #PPX- SCDs , OOB     spent ___45_ mins preparing dc  above plan discussed with pt, bedside RN and MD Tong HPI:  HPI:  72M w/PMH HTN, schizophrenia, GERD, recent admit for fall and weakness (9/17-9/20), found to have fever/tachy, treated w/ IV abx although no source found, TODAY presents via EMS c/o 4days of abd pain w/ watery diarrhea.  Pain is lower abdomen, R>L, constant, 3/10, no e/a factors. Watery diarrhea, worse at night, waking him up from sleep w/ 3-5 episodes per night.  He tried taking pepto bismol w/ only mild relief.  Denies any similar prior episodes.  Denies n/v, hemtochezia, melena, hematemesis,  f/c, cp, sob, dysuria.    In ED, CT a/p +pancolitis, wbc 12, Cr 1.4 (prior 0.8), given iv cipro, flagyl, pepcid, ivf.  +guaiac     pt admitted for acute diarrhea CT scan with pancolitis. GI PCR positive for shiga like toxin e.coli - negative for other pathogens. blood culture and urine culture negative. pt was evaled by ID Dr Santiago - sandhya dc'd due to concerns for possible HUS. pt with alyssa due to prerenal losses. pt with hypokalemia which was corrected. pt diet was advanced from clear liquids to regular diet without increasing bowel movements. pt was provided for supportive care. no other events during hospitalization.    see progress note for PE, vitals, results of lab and imaging     A&P   #Pancolitis, due to shiga like toxin producing e-coli   - s/p PO Vanco , s/p IV Cipro and Flagyl   - ID cs no other interventions   - c.diff negative. + ecoli   - reusme regular diet upon dc   - + diarrhea self limiting   - Pepcid daily     # acute hypokalemia   - due to diarrhea   - repleted and repeat in AM labs     #ALYSSA, 2/2 GI loss d/t diarrhea   - resolved   - off IV fluids encouraged PO hydration     #schizophrenia  - stable  - c/w olanzopine     #PPX- SCDs , OOB     spent ___45_ mins preparing dc  above plan discussed with pt, bedside RN and MD Tong     Attending Attestation:  I agree with the assessment and plan of ERIKA Ruiz as stated and discussed.

## 2022-10-21 NOTE — DISCHARGE NOTE NURSING/CASE MANAGEMENT/SOCIAL WORK - NSDCVIVACCINE_GEN_ALL_CORE_FT
influenza, injectable, quadrivalent, preservative free; 04-Sep-2019 12:23; Miryam Ma (RN); [employer]; H47PB (Exp. Date: 30-Jun-2020); IntraMuscular; Deltoid Left.; 0.5 milliLiter(s); VIS (VIS Published: 15-Aug-2019, VIS Presented: 04-Sep-2019);

## 2022-10-21 NOTE — DISCHARGE NOTE PROVIDER - NSDCCPCAREPLAN_GEN_ALL_CORE_FT
PRINCIPAL DISCHARGE DIAGNOSIS  Diagnosis: Pancolitis  Assessment and Plan of Treatment: you were admitted to the hospital due to diarrhea. Continue to drink plenty of fluids. make sure you wash your hands with soap and water for 20 seconds to prevent the spread of germs. Monitor your stool (poop). Contact your primary care provider if these occur or go to the Emergency room if symptoms are severe -  •Pain in your stomach.  •A burning feeling in your stomach.  •Feeling sick to your stomach (nauseous).  •Throwing up (vomiting).  •Feeling too full after you eat.  •Weight loss  •Bad breath.  •Throwing up blood.  •Blood in your poop (stool).  Contact your primary care provider if:   •Your symptoms get worse.  •Your symptoms go away and then come back.  •You throw up blood or something that looks like coffee grounds.  •You have black or dark red poop.  •You throw up any time you try to drink fluids.  •Your stomach pain gets worse.  •You have a fever.  •You do not feel better after one week.      SECONDARY DISCHARGE DIAGNOSES  Diagnosis: Stool guaiac positive  Assessment and Plan of Treatment:

## 2022-10-21 NOTE — DISCHARGE NOTE PROVIDER - NSDCMRMEDTOKEN_GEN_ALL_CORE_FT
amLODIPine 5 mg oral tablet: 1 tab(s) orally once a day  fluticasone 50 mcg/inh nasal spray: 1 spray(s) in each nostril once a day, As Needed  metoprolol tartrate 25 mg oral tablet: 0.5 tab(s) orally 2 times a day   OLANZapine 5 mg oral tablet: 1 tab(s) orally once a day (at bedtime)  omeprazole 40 mg oral delayed release capsule: 1 cap(s) orally once a day  Vitamin D3 25 mcg (1000 intl units) oral tablet: 1 tab(s) orally once a day

## 2022-10-21 NOTE — DISCHARGE NOTE PROVIDER - NSDCACTIVITY_GEN_ALL_CORE
Patient has been notified.    Lab orders have been faxed to [facility] per the patients request.  
Patient is scheduled for Monday.  She said she thought you wanted her to repeat her Thyroid labs before her visit.  (Telephone Visit)  She is aware that you are out of the office until Monday, but she asked that I send you a message.  Please advise. thanks  
She can come in today, Friday for labs and we can discuss results at her visit Monday  
No restrictions

## 2023-01-09 NOTE — ED ADULT NURSE NOTE - CAS DISCH TRANSFER METHOD
LC in SCN to assist with feeding at the breast. Baby unswaddled and began to show feeding cues during RN assessment.     LC assisted mother in placing baby in laid back hold on mother's left breast. Baby latched after 1-2 minutes and was still feeding after 30 min that LC was observing the feeding. Swallows noted.     Education given to parents on feeding plan during this time.     Encouraged to call lactation warmline for follow up as needed.   Private car

## 2023-04-10 NOTE — DISCHARGE NOTE PROVIDER - EXTENDED VTE YES NO FOR MLM ENOXAPARIN
-- DO NOT REPLY / DO NOT REPLY ALL --  -- Message is from Engagement Center Operations (ECO) --    ONLY TO BE USED WITHIN A REFILL MEDICATION ENCOUNTER    Med Refill  Is the patient currently having any symptoms?: No/Non-Emergent symptoms    Name of medication requested: See pended med    Has patient contacted the pharmacy? No, direct patient to contact pharmacy first as they will be able to process the refill request directly    Is this the first request for the medication in the last 48 hours?: Yes      Patient is requesting a medication refill - medication is on active list      Full name of the provider who ordered the medication: Den Pacheco MD    Clinic site name / Account # for provider: [facility]    Preferred Pharmacy: Pharmacy  [id number] [address] [facility]    Patient confirmed the above pharmacy as correct?  Yes      Caller Information       Type Contact Phone/Fax    04/10/2023 04:14 PM CDT Phone (Incoming) TERESE JIMÉNEZ (Mother) [phone]          Alternative phone number: none    Can a detailed message be left?: Yes    Patient is completely out of medication: Verify if patient is currently experiencing symptoms. If patient is symptomatic, proceed with front end triage instead of medication refill. If patient is not symptomatic but is completely out of medication, kiya as High priority when routing. Inform patient: “Please call back with any questions or concerns and if your condition becomes life threatening, you should seek immediate medical assistance by calling 911 or going to the Emergency Department for evaluation.”    Inform all patients: \"If the clinical team needs to contact you regarding this refill, please be aware the return phone call may come from an unidentified or out of state phone number and your refill request will be addressed as soon as the clinical team reviews your message.\"   ,

## 2023-04-19 NOTE — ED ADULT NURSE NOTE - NSSUHOSCREENINGYN_ED_ALL_ED
Physical Therapy Daily Treatment Note    Date: 2023  Patient Name: Wilma Jett  : 1944   MRN: [id number]  DOInjury: -  DOSx: 2023  Referring Provider: Olga Jensen DO  [address] [facility] Diagnosis:      Diagnosis Orders   1. Primary osteoarthritis of left knee                Outcome Measure:  LEFS 72% impairment  end score 2023=35%         X = TO BE PERFORMED NEXT VISIT  > = PROGRESS TO THIS    S: pt reports just general soreness today . ( lying flat on bed really increases her back pain  due to compression fracture in her lower spine getting an MRI next week then possible surgery. O: Discussed anatomy, physiology, body mechanics, principles of loading, and progressive loading/activity. Access Code: [email or web address]  URL: [email or web address]  Date: 2023  Prepared by: Yomi Hickman    Exercises  - Supine Heel Slide  - 1 x daily - 7 x weekly - 3 sets - 10 reps  - Mini Squat with Counter Support  - 1 x daily - 7 x weekly - 3 sets - 10 reps  - Standing March with Counter Support  - 1 x daily - 7 x weekly - 3 sets - 10 reps  - Seated Knee Extension Stretch with Chair  - 2 x daily - 7 x weekly - 5 min hold  - Seated Knee Flexion Stretch  - 2 x daily - 7 x weekly - 1 sets - 3 reps - 1 min hold    Time  0900- 0940am      Visit    visit  Repeat outcome measure at mid point and end. Pain    Pain at rest 3/10     ROM  115 /- -5  2023     Modalities       Compression/ice  MO   Manual      Stretching knee flexion   MT   Stretching       Patella mobs X 30  Re instructed  TE   Prone hangs   TE   Heel props seated  1 x 4 min seated   TE   Knee flex stretch-seated  TE   Prone self flexion stretch   TE   Exercise   Lying supine bothers pt at this time due to compression fx of spine . ok to perform therapy ex's     Nustep  L 5 x  10 min   TE         SAQ x  TE   Heel slides  TE   SLR Causes back pain at this time   TE   LAQ  3#  3 x 15  TE   CR Yes - the patient is able to be screened

## 2023-05-08 NOTE — ED BEHAVIORAL HEALTH ASSESSMENT NOTE - NS ED BHA MED ROS HEMATOLOGIC LYMPHATIC
[FreeTextEntry1] : I saw him initially 6/25/19 with the following history.He has had concentration difficulty since childhood. He was diagnosed with ADD as a child. He was on Adderall up until his 20s. He is not currently on meds except for occasionally taking a friend's Adderall. 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 He is having increasing difficulty concentrating and focusing.\par \par He is on Suboxone for opioid addiction in the past prescribed by his primary care doctor.\par \par Currently on Adderall 20 mg twice a day Which is working well.\par \par Also has some anxiety. Takes 1 mg of Klonopin typically every other day which is prescribed by his primary care doctor\par . No complaints

## 2023-05-14 ENCOUNTER — INPATIENT (INPATIENT)
Facility: HOSPITAL | Age: 73
LOS: 8 days | Discharge: HOME | DRG: 871 | End: 2023-05-23
Attending: INTERNAL MEDICINE | Admitting: INTERNAL MEDICINE
Payer: MEDICARE

## 2023-05-14 VITALS
DIASTOLIC BLOOD PRESSURE: 57 MMHG | RESPIRATION RATE: 26 BRPM | WEIGHT: 145.06 LBS | SYSTOLIC BLOOD PRESSURE: 87 MMHG | HEART RATE: 122 BPM | OXYGEN SATURATION: 94 % | TEMPERATURE: 103 F | HEIGHT: 62 IN

## 2023-05-14 PROCEDURE — 99291 CRITICAL CARE FIRST HOUR: CPT

## 2023-05-15 DIAGNOSIS — A41.9 SEPSIS, UNSPECIFIED ORGANISM: ICD-10-CM

## 2023-05-15 LAB
-  K. PNEUMONIAE GROUP: SIGNIFICANT CHANGE UP
ADD ON TEST-SPECIMEN IN LAB: SIGNIFICANT CHANGE UP
ALBUMIN SERPL ELPH-MCNC: 2.8 G/DL — LOW (ref 3.3–5)
ALBUMIN SERPL ELPH-MCNC: 2.9 G/DL — LOW (ref 3.3–5)
ALBUMIN SERPL ELPH-MCNC: 3 G/DL — LOW (ref 3.3–5)
ALP SERPL-CCNC: 103 U/L — SIGNIFICANT CHANGE UP (ref 40–120)
ALP SERPL-CCNC: 122 U/L — HIGH (ref 40–120)
ALP SERPL-CCNC: 95 U/L — SIGNIFICANT CHANGE UP (ref 40–120)
ALT FLD-CCNC: 234 U/L — HIGH (ref 12–78)
ALT FLD-CCNC: 243 U/L — HIGH (ref 12–78)
ALT FLD-CCNC: 261 U/L — HIGH (ref 12–78)
AMPHET UR-MCNC: NEGATIVE — SIGNIFICANT CHANGE UP
ANION GAP SERPL CALC-SCNC: 10 MMOL/L — SIGNIFICANT CHANGE UP (ref 5–17)
ANION GAP SERPL CALC-SCNC: 19 MMOL/L — HIGH (ref 5–17)
ANION GAP SERPL CALC-SCNC: 6 MMOL/L — SIGNIFICANT CHANGE UP (ref 5–17)
ANION GAP SERPL CALC-SCNC: 8 MMOL/L — SIGNIFICANT CHANGE UP (ref 5–17)
APAP SERPL-MCNC: 7 UG/ML — LOW (ref 10–30)
APPEARANCE UR: CLEAR — SIGNIFICANT CHANGE UP
APTT BLD: 166.2 SEC — CRITICAL HIGH (ref 27.5–35.5)
APTT BLD: 35.6 SEC — HIGH (ref 27.5–35.5)
AST SERPL-CCNC: 339 U/L — HIGH (ref 15–37)
AST SERPL-CCNC: 353 U/L — HIGH (ref 15–37)
AST SERPL-CCNC: 406 U/L — HIGH (ref 15–37)
BACTERIA # UR AUTO: ABNORMAL
BARBITURATES UR SCN-MCNC: NEGATIVE — SIGNIFICANT CHANGE UP
BASOPHILS # BLD AUTO: 0 K/UL — SIGNIFICANT CHANGE UP (ref 0–0.2)
BASOPHILS # BLD AUTO: 0 K/UL — SIGNIFICANT CHANGE UP (ref 0–0.2)
BASOPHILS NFR BLD AUTO: 0 % — SIGNIFICANT CHANGE UP (ref 0–2)
BASOPHILS NFR BLD AUTO: 0 % — SIGNIFICANT CHANGE UP (ref 0–2)
BENZODIAZ UR-MCNC: NEGATIVE — SIGNIFICANT CHANGE UP
BILIRUB SERPL-MCNC: 3.2 MG/DL — HIGH (ref 0.2–1.2)
BILIRUB SERPL-MCNC: 3.6 MG/DL — HIGH (ref 0.2–1.2)
BILIRUB SERPL-MCNC: 3.7 MG/DL — HIGH (ref 0.2–1.2)
BILIRUB UR-MCNC: ABNORMAL
BLD GP AB SCN SERPL QL: SIGNIFICANT CHANGE UP
BUN SERPL-MCNC: 26 MG/DL — HIGH (ref 7–23)
BUN SERPL-MCNC: 26 MG/DL — HIGH (ref 7–23)
BUN SERPL-MCNC: 30 MG/DL — HIGH (ref 7–23)
BUN SERPL-MCNC: 32 MG/DL — HIGH (ref 7–23)
CALCIUM SERPL-MCNC: 7.7 MG/DL — LOW (ref 8.5–10.1)
CALCIUM SERPL-MCNC: 7.8 MG/DL — LOW (ref 8.5–10.1)
CALCIUM SERPL-MCNC: 8 MG/DL — LOW (ref 8.5–10.1)
CALCIUM SERPL-MCNC: 8.4 MG/DL — LOW (ref 8.5–10.1)
CHLORIDE SERPL-SCNC: 102 MMOL/L — SIGNIFICANT CHANGE UP (ref 96–108)
CHLORIDE SERPL-SCNC: 109 MMOL/L — HIGH (ref 96–108)
CHLORIDE SERPL-SCNC: 114 MMOL/L — HIGH (ref 96–108)
CHLORIDE SERPL-SCNC: 116 MMOL/L — HIGH (ref 96–108)
CO2 SERPL-SCNC: 21 MMOL/L — LOW (ref 22–31)
CO2 SERPL-SCNC: 21 MMOL/L — LOW (ref 22–31)
CO2 SERPL-SCNC: 23 MMOL/L — SIGNIFICANT CHANGE UP (ref 22–31)
CO2 SERPL-SCNC: 24 MMOL/L — SIGNIFICANT CHANGE UP (ref 22–31)
COCAINE METAB.OTHER UR-MCNC: NEGATIVE — SIGNIFICANT CHANGE UP
COLOR SPEC: YELLOW — SIGNIFICANT CHANGE UP
CREAT SERPL-MCNC: 1.28 MG/DL — SIGNIFICANT CHANGE UP (ref 0.5–1.3)
CREAT SERPL-MCNC: 1.43 MG/DL — HIGH (ref 0.5–1.3)
CREAT SERPL-MCNC: 1.79 MG/DL — HIGH (ref 0.5–1.3)
CREAT SERPL-MCNC: 2.55 MG/DL — HIGH (ref 0.5–1.3)
DIFF PNL FLD: ABNORMAL
EGFR: 26 ML/MIN/1.73M2 — LOW
EGFR: 40 ML/MIN/1.73M2 — LOW
EGFR: 52 ML/MIN/1.73M2 — LOW
EGFR: 59 ML/MIN/1.73M2 — LOW
EOSINOPHIL # BLD AUTO: 0 K/UL — SIGNIFICANT CHANGE UP (ref 0–0.5)
EOSINOPHIL # BLD AUTO: 0 K/UL — SIGNIFICANT CHANGE UP (ref 0–0.5)
EOSINOPHIL NFR BLD AUTO: 0 % — SIGNIFICANT CHANGE UP (ref 0–6)
EOSINOPHIL NFR BLD AUTO: 0 % — SIGNIFICANT CHANGE UP (ref 0–6)
EPI CELLS # UR: SIGNIFICANT CHANGE UP
GLUCOSE BLDC GLUCOMTR-MCNC: 97 MG/DL — SIGNIFICANT CHANGE UP (ref 70–99)
GLUCOSE SERPL-MCNC: 101 MG/DL — HIGH (ref 70–99)
GLUCOSE SERPL-MCNC: 67 MG/DL — LOW (ref 70–99)
GLUCOSE SERPL-MCNC: 81 MG/DL — SIGNIFICANT CHANGE UP (ref 70–99)
GLUCOSE SERPL-MCNC: 90 MG/DL — SIGNIFICANT CHANGE UP (ref 70–99)
GLUCOSE UR QL: NEGATIVE — SIGNIFICANT CHANGE UP
GRAM STN FLD: SIGNIFICANT CHANGE UP
GRAM STN FLD: SIGNIFICANT CHANGE UP
HCT VFR BLD CALC: 30.6 % — LOW (ref 39–50)
HCT VFR BLD CALC: 34.4 % — LOW (ref 39–50)
HCT VFR BLD CALC: 35 % — LOW (ref 39–50)
HCT VFR BLD CALC: 35.5 % — LOW (ref 39–50)
HGB BLD-MCNC: 10.5 G/DL — LOW (ref 13–17)
HGB BLD-MCNC: 11.7 G/DL — LOW (ref 13–17)
HGB BLD-MCNC: 11.8 G/DL — LOW (ref 13–17)
HGB BLD-MCNC: 12.3 G/DL — LOW (ref 13–17)
INR BLD: 1.78 RATIO — HIGH (ref 0.88–1.16)
KETONES UR-MCNC: ABNORMAL
LACTATE SERPL-SCNC: 1 MMOL/L — SIGNIFICANT CHANGE UP (ref 0.7–2)
LACTATE SERPL-SCNC: 2.9 MMOL/L — HIGH (ref 0.7–2)
LACTATE SERPL-SCNC: 4.4 MMOL/L — CRITICAL HIGH (ref 0.7–2)
LEUKOCYTE ESTERASE UR-ACNC: ABNORMAL
LIDOCAIN IGE QN: 38 U/L — LOW (ref 73–393)
LYMPHOCYTES # BLD AUTO: 0.67 K/UL — LOW (ref 1–3.3)
LYMPHOCYTES # BLD AUTO: 0.94 K/UL — LOW (ref 1–3.3)
LYMPHOCYTES # BLD AUTO: 5 % — LOW (ref 13–44)
LYMPHOCYTES # BLD AUTO: 6 % — LOW (ref 13–44)
MAGNESIUM SERPL-MCNC: 1.9 MG/DL — SIGNIFICANT CHANGE UP (ref 1.6–2.6)
MAGNESIUM SERPL-MCNC: 1.9 MG/DL — SIGNIFICANT CHANGE UP (ref 1.6–2.6)
MAGNESIUM SERPL-MCNC: 2 MG/DL — SIGNIFICANT CHANGE UP (ref 1.6–2.6)
MAGNESIUM SERPL-MCNC: 2.1 MG/DL — SIGNIFICANT CHANGE UP (ref 1.6–2.6)
MANUAL SMEAR VERIFICATION: SIGNIFICANT CHANGE UP
MANUAL SMEAR VERIFICATION: SIGNIFICANT CHANGE UP
MCHC RBC-ENTMCNC: 28.3 PG — SIGNIFICANT CHANGE UP (ref 27–34)
MCHC RBC-ENTMCNC: 28.6 PG — SIGNIFICANT CHANGE UP (ref 27–34)
MCHC RBC-ENTMCNC: 28.8 PG — SIGNIFICANT CHANGE UP (ref 27–34)
MCHC RBC-ENTMCNC: 28.9 PG — SIGNIFICANT CHANGE UP (ref 27–34)
MCHC RBC-ENTMCNC: 33.7 GM/DL — SIGNIFICANT CHANGE UP (ref 32–36)
MCHC RBC-ENTMCNC: 34 GM/DL — SIGNIFICANT CHANGE UP (ref 32–36)
MCHC RBC-ENTMCNC: 34.3 GM/DL — SIGNIFICANT CHANGE UP (ref 32–36)
MCHC RBC-ENTMCNC: 34.6 GM/DL — SIGNIFICANT CHANGE UP (ref 32–36)
MCV RBC AUTO: 83.3 FL — SIGNIFICANT CHANGE UP (ref 80–100)
MCV RBC AUTO: 83.5 FL — SIGNIFICANT CHANGE UP (ref 80–100)
MCV RBC AUTO: 83.8 FL — SIGNIFICANT CHANGE UP (ref 80–100)
MCV RBC AUTO: 85 FL — SIGNIFICANT CHANGE UP (ref 80–100)
METAMYELOCYTES # FLD: 1 % — HIGH (ref 0–0)
METHADONE UR-MCNC: NEGATIVE — SIGNIFICANT CHANGE UP
METHOD TYPE: SIGNIFICANT CHANGE UP
MONOCYTES # BLD AUTO: 0.53 K/UL — SIGNIFICANT CHANGE UP (ref 0–0.9)
MONOCYTES # BLD AUTO: 0.63 K/UL — SIGNIFICANT CHANGE UP (ref 0–0.9)
MONOCYTES NFR BLD AUTO: 4 % — SIGNIFICANT CHANGE UP (ref 2–14)
MONOCYTES NFR BLD AUTO: 4 % — SIGNIFICANT CHANGE UP (ref 2–14)
NEUTROPHILS # BLD AUTO: 12.01 K/UL — HIGH (ref 1.8–7.4)
NEUTROPHILS # BLD AUTO: 14.08 K/UL — HIGH (ref 1.8–7.4)
NEUTROPHILS NFR BLD AUTO: 71 % — SIGNIFICANT CHANGE UP (ref 43–77)
NEUTROPHILS NFR BLD AUTO: 90 % — HIGH (ref 43–77)
NEUTS BAND # BLD: 19 % — HIGH (ref 0–8)
NITRITE UR-MCNC: NEGATIVE — SIGNIFICANT CHANGE UP
NRBC # BLD: 0 /100 — SIGNIFICANT CHANGE UP (ref 0–0)
NRBC # BLD: 0 /100 — SIGNIFICANT CHANGE UP (ref 0–0)
NRBC # BLD: SIGNIFICANT CHANGE UP /100 WBCS (ref 0–0)
NRBC # BLD: SIGNIFICANT CHANGE UP /100 WBCS (ref 0–0)
OPIATES UR-MCNC: NEGATIVE — SIGNIFICANT CHANGE UP
PCP SPEC-MCNC: SIGNIFICANT CHANGE UP
PCP UR-MCNC: NEGATIVE — SIGNIFICANT CHANGE UP
PH UR: 7 — SIGNIFICANT CHANGE UP (ref 5–8)
PHOSPHATE SERPL-MCNC: 1.7 MG/DL — LOW (ref 2.5–4.5)
PHOSPHATE SERPL-MCNC: 2.2 MG/DL — LOW (ref 2.5–4.5)
PHOSPHATE SERPL-MCNC: 2.2 MG/DL — LOW (ref 2.5–4.5)
PLAT MORPH BLD: NORMAL — SIGNIFICANT CHANGE UP
PLAT MORPH BLD: NORMAL — SIGNIFICANT CHANGE UP
PLATELET # BLD AUTO: 127 K/UL — LOW (ref 150–400)
PLATELET # BLD AUTO: 137 K/UL — LOW (ref 150–400)
PLATELET # BLD AUTO: 146 K/UL — LOW (ref 150–400)
PLATELET # BLD AUTO: 152 K/UL — SIGNIFICANT CHANGE UP (ref 150–400)
POTASSIUM SERPL-MCNC: 2.8 MMOL/L — CRITICAL LOW (ref 3.5–5.3)
POTASSIUM SERPL-MCNC: 3.1 MMOL/L — LOW (ref 3.5–5.3)
POTASSIUM SERPL-MCNC: 3.4 MMOL/L — LOW (ref 3.5–5.3)
POTASSIUM SERPL-MCNC: 3.7 MMOL/L — SIGNIFICANT CHANGE UP (ref 3.5–5.3)
POTASSIUM SERPL-SCNC: 2.8 MMOL/L — CRITICAL LOW (ref 3.5–5.3)
POTASSIUM SERPL-SCNC: 3.1 MMOL/L — LOW (ref 3.5–5.3)
POTASSIUM SERPL-SCNC: 3.4 MMOL/L — LOW (ref 3.5–5.3)
POTASSIUM SERPL-SCNC: 3.7 MMOL/L — SIGNIFICANT CHANGE UP (ref 3.5–5.3)
PROCALCITONIN SERPL-MCNC: 35.9 NG/ML — HIGH (ref 0.02–0.1)
PROT SERPL-MCNC: 6.2 GM/DL — SIGNIFICANT CHANGE UP (ref 6–8.3)
PROT SERPL-MCNC: 6.4 GM/DL — SIGNIFICANT CHANGE UP (ref 6–8.3)
PROT SERPL-MCNC: 6.7 GM/DL — SIGNIFICANT CHANGE UP (ref 6–8.3)
PROT UR-MCNC: 15
PROTHROM AB SERPL-ACNC: 20.8 SEC — HIGH (ref 10.5–13.4)
RAPID RVP RESULT: SIGNIFICANT CHANGE UP
RBC # BLD: 3.65 M/UL — LOW (ref 4.2–5.8)
RBC # BLD: 4.12 M/UL — LOW (ref 4.2–5.8)
RBC # BLD: 4.13 M/UL — LOW (ref 4.2–5.8)
RBC # BLD: 4.25 M/UL — SIGNIFICANT CHANGE UP (ref 4.2–5.8)
RBC # FLD: 14.5 % — SIGNIFICANT CHANGE UP (ref 10.3–14.5)
RBC # FLD: 14.6 % — HIGH (ref 10.3–14.5)
RBC # FLD: 14.8 % — HIGH (ref 10.3–14.5)
RBC # FLD: 14.8 % — HIGH (ref 10.3–14.5)
RBC BLD AUTO: NORMAL — SIGNIFICANT CHANGE UP
RBC BLD AUTO: NORMAL — SIGNIFICANT CHANGE UP
RBC CASTS # UR COMP ASSIST: ABNORMAL /HPF (ref 0–4)
SALICYLATES SERPL-MCNC: <1.7 MG/DL — LOW (ref 2.8–20)
SARS-COV-2 RNA SPEC QL NAA+PROBE: SIGNIFICANT CHANGE UP
SODIUM SERPL-SCNC: 142 MMOL/L — SIGNIFICANT CHANGE UP (ref 135–145)
SODIUM SERPL-SCNC: 143 MMOL/L — SIGNIFICANT CHANGE UP (ref 135–145)
SODIUM SERPL-SCNC: 143 MMOL/L — SIGNIFICANT CHANGE UP (ref 135–145)
SODIUM SERPL-SCNC: 145 MMOL/L — SIGNIFICANT CHANGE UP (ref 135–145)
SP GR SPEC: 1 — LOW (ref 1.01–1.02)
SPECIMEN SOURCE: SIGNIFICANT CHANGE UP
SPECIMEN SOURCE: SIGNIFICANT CHANGE UP
THC UR QL: NEGATIVE — SIGNIFICANT CHANGE UP
UROBILINOGEN FLD QL: 4
WBC # BLD: 13.34 K/UL — HIGH (ref 3.8–10.5)
WBC # BLD: 15.64 K/UL — HIGH (ref 3.8–10.5)
WBC # BLD: 20.27 K/UL — HIGH (ref 3.8–10.5)
WBC # BLD: 21.81 K/UL — HIGH (ref 3.8–10.5)
WBC # FLD AUTO: 13.34 K/UL — HIGH (ref 3.8–10.5)
WBC # FLD AUTO: 15.64 K/UL — HIGH (ref 3.8–10.5)
WBC # FLD AUTO: 20.27 K/UL — HIGH (ref 3.8–10.5)
WBC # FLD AUTO: 21.81 K/UL — HIGH (ref 3.8–10.5)
WBC UR QL: ABNORMAL /HPF (ref 0–5)

## 2023-05-15 PROCEDURE — 97161 PT EVAL LOW COMPLEX 20 MIN: CPT | Mod: GP

## 2023-05-15 PROCEDURE — 43264 ERCP REMOVE DUCT CALCULI: CPT | Mod: GC

## 2023-05-15 PROCEDURE — 74181 MRI ABDOMEN W/O CONTRAST: CPT | Mod: 26

## 2023-05-15 PROCEDURE — 71045 X-RAY EXAM CHEST 1 VIEW: CPT | Mod: 26

## 2023-05-15 PROCEDURE — 93975 VASCULAR STUDY: CPT | Mod: 26

## 2023-05-15 PROCEDURE — 93306 TTE W/DOPPLER COMPLETE: CPT

## 2023-05-15 PROCEDURE — 85025 COMPLETE CBC W/AUTO DIFF WBC: CPT

## 2023-05-15 PROCEDURE — 85027 COMPLETE CBC AUTOMATED: CPT

## 2023-05-15 PROCEDURE — 76700 US EXAM ABDOM COMPLETE: CPT

## 2023-05-15 PROCEDURE — 99291 CRITICAL CARE FIRST HOUR: CPT

## 2023-05-15 PROCEDURE — 94760 N-INVAS EAR/PLS OXIMETRY 1: CPT

## 2023-05-15 PROCEDURE — 82272 OCCULT BLD FECES 1-3 TESTS: CPT

## 2023-05-15 PROCEDURE — 83550 IRON BINDING TEST: CPT

## 2023-05-15 PROCEDURE — C1889: CPT

## 2023-05-15 PROCEDURE — 83735 ASSAY OF MAGNESIUM: CPT

## 2023-05-15 PROCEDURE — 99222 1ST HOSP IP/OBS MODERATE 55: CPT

## 2023-05-15 PROCEDURE — 93306 TTE W/DOPPLER COMPLETE: CPT | Mod: 26

## 2023-05-15 PROCEDURE — 84100 ASSAY OF PHOSPHORUS: CPT

## 2023-05-15 PROCEDURE — 74181 MRI ABDOMEN W/O CONTRAST: CPT

## 2023-05-15 PROCEDURE — 80053 COMPREHEN METABOLIC PANEL: CPT

## 2023-05-15 PROCEDURE — 84145 PROCALCITONIN (PCT): CPT

## 2023-05-15 PROCEDURE — 93975 VASCULAR STUDY: CPT

## 2023-05-15 PROCEDURE — 76705 ECHO EXAM OF ABDOMEN: CPT

## 2023-05-15 PROCEDURE — 82728 ASSAY OF FERRITIN: CPT

## 2023-05-15 PROCEDURE — 87086 URINE CULTURE/COLONY COUNT: CPT

## 2023-05-15 PROCEDURE — 76000 FLUOROSCOPY <1 HR PHYS/QHP: CPT

## 2023-05-15 PROCEDURE — 43274 ERCP DUCT STENT PLACEMENT: CPT | Mod: GC

## 2023-05-15 PROCEDURE — C1769: CPT

## 2023-05-15 PROCEDURE — 93010 ELECTROCARDIOGRAM REPORT: CPT

## 2023-05-15 PROCEDURE — 83540 ASSAY OF IRON: CPT

## 2023-05-15 PROCEDURE — 76700 US EXAM ABDOM COMPLETE: CPT | Mod: 26,59

## 2023-05-15 PROCEDURE — 80048 BASIC METABOLIC PNL TOTAL CA: CPT

## 2023-05-15 PROCEDURE — C2625: CPT

## 2023-05-15 PROCEDURE — 80307 DRUG TEST PRSMV CHEM ANLYZR: CPT

## 2023-05-15 PROCEDURE — 87040 BLOOD CULTURE FOR BACTERIA: CPT

## 2023-05-15 PROCEDURE — 74177 CT ABD & PELVIS W/CONTRAST: CPT | Mod: 26,MA

## 2023-05-15 PROCEDURE — 83605 ASSAY OF LACTIC ACID: CPT

## 2023-05-15 PROCEDURE — 99222 1ST HOSP IP/OBS MODERATE 55: CPT | Mod: 25

## 2023-05-15 PROCEDURE — 82962 GLUCOSE BLOOD TEST: CPT

## 2023-05-15 PROCEDURE — 85045 AUTOMATED RETICULOCYTE COUNT: CPT

## 2023-05-15 PROCEDURE — 85730 THROMBOPLASTIN TIME PARTIAL: CPT

## 2023-05-15 PROCEDURE — 81001 URINALYSIS AUTO W/SCOPE: CPT

## 2023-05-15 PROCEDURE — 36415 COLL VENOUS BLD VENIPUNCTURE: CPT

## 2023-05-15 RX ORDER — PIPERACILLIN AND TAZOBACTAM 4; .5 G/20ML; G/20ML
3.38 INJECTION, POWDER, LYOPHILIZED, FOR SOLUTION INTRAVENOUS EVERY 8 HOURS
Refills: 0 | Status: DISCONTINUED | OUTPATIENT
Start: 2023-05-15 | End: 2023-05-22

## 2023-05-15 RX ORDER — HEPARIN SODIUM 5000 [USP'U]/ML
INJECTION INTRAVENOUS; SUBCUTANEOUS
Qty: 25000 | Refills: 0 | Status: DISCONTINUED | OUTPATIENT
Start: 2023-05-15 | End: 2023-05-15

## 2023-05-15 RX ORDER — HEPARIN SODIUM 5000 [USP'U]/ML
2500 INJECTION INTRAVENOUS; SUBCUTANEOUS EVERY 6 HOURS
Refills: 0 | Status: DISCONTINUED | OUTPATIENT
Start: 2023-05-15 | End: 2023-05-15

## 2023-05-15 RX ORDER — SODIUM,POTASSIUM PHOSPHATES 278-250MG
2 POWDER IN PACKET (EA) ORAL ONCE
Refills: 0 | Status: COMPLETED | OUTPATIENT
Start: 2023-05-15 | End: 2023-05-16

## 2023-05-15 RX ORDER — PIPERACILLIN AND TAZOBACTAM 4; .5 G/20ML; G/20ML
3.38 INJECTION, POWDER, LYOPHILIZED, FOR SOLUTION INTRAVENOUS ONCE
Refills: 0 | Status: COMPLETED | OUTPATIENT
Start: 2023-05-15 | End: 2023-05-15

## 2023-05-15 RX ORDER — POTASSIUM CHLORIDE 20 MEQ
40 PACKET (EA) ORAL ONCE
Refills: 0 | Status: COMPLETED | OUTPATIENT
Start: 2023-05-15 | End: 2023-05-15

## 2023-05-15 RX ORDER — PANTOPRAZOLE SODIUM 20 MG/1
40 TABLET, DELAYED RELEASE ORAL
Refills: 0 | Status: DISCONTINUED | OUTPATIENT
Start: 2023-05-15 | End: 2023-05-23

## 2023-05-15 RX ORDER — NOREPINEPHRINE BITARTRATE/D5W 8 MG/250ML
0.05 PLASTIC BAG, INJECTION (ML) INTRAVENOUS
Qty: 8 | Refills: 0 | Status: DISCONTINUED | OUTPATIENT
Start: 2023-05-15 | End: 2023-05-17

## 2023-05-15 RX ORDER — HEPARIN SODIUM 5000 [USP'U]/ML
5500 INJECTION INTRAVENOUS; SUBCUTANEOUS EVERY 6 HOURS
Refills: 0 | Status: DISCONTINUED | OUTPATIENT
Start: 2023-05-15 | End: 2023-05-15

## 2023-05-15 RX ORDER — HEPARIN SODIUM 5000 [USP'U]/ML
5500 INJECTION INTRAVENOUS; SUBCUTANEOUS ONCE
Refills: 0 | Status: DISCONTINUED | OUTPATIENT
Start: 2023-05-15 | End: 2023-05-15

## 2023-05-15 RX ORDER — HEPARIN SODIUM 5000 [USP'U]/ML
5000 INJECTION INTRAVENOUS; SUBCUTANEOUS ONCE
Refills: 0 | Status: COMPLETED | OUTPATIENT
Start: 2023-05-15 | End: 2023-05-15

## 2023-05-15 RX ORDER — POTASSIUM CHLORIDE 20 MEQ
10 PACKET (EA) ORAL
Refills: 0 | Status: COMPLETED | OUTPATIENT
Start: 2023-05-15 | End: 2023-05-15

## 2023-05-15 RX ORDER — ACETAMINOPHEN 500 MG
1000 TABLET ORAL ONCE
Refills: 0 | Status: COMPLETED | OUTPATIENT
Start: 2023-05-15 | End: 2023-05-15

## 2023-05-15 RX ORDER — ATORVASTATIN CALCIUM 80 MG/1
10 TABLET, FILM COATED ORAL AT BEDTIME
Refills: 0 | Status: DISCONTINUED | OUTPATIENT
Start: 2023-05-15 | End: 2023-05-23

## 2023-05-15 RX ORDER — OLANZAPINE 15 MG/1
5 TABLET, FILM COATED ORAL DAILY
Refills: 0 | Status: DISCONTINUED | OUTPATIENT
Start: 2023-05-15 | End: 2023-05-23

## 2023-05-15 RX ORDER — CHLORHEXIDINE GLUCONATE 213 G/1000ML
1 SOLUTION TOPICAL
Refills: 0 | Status: DISCONTINUED | OUTPATIENT
Start: 2023-05-15 | End: 2023-05-17

## 2023-05-15 RX ORDER — SODIUM CHLORIDE 9 MG/ML
1000 INJECTION, SOLUTION INTRAVENOUS
Refills: 0 | Status: DISCONTINUED | OUTPATIENT
Start: 2023-05-15 | End: 2023-05-17

## 2023-05-15 RX ORDER — SODIUM CHLORIDE 9 MG/ML
1000 INJECTION INTRAMUSCULAR; INTRAVENOUS; SUBCUTANEOUS ONCE
Refills: 0 | Status: COMPLETED | OUTPATIENT
Start: 2023-05-15 | End: 2023-05-15

## 2023-05-15 RX ORDER — CHOLECALCIFEROL (VITAMIN D3) 125 MCG
1 CAPSULE ORAL
Qty: 0 | Refills: 0 | DISCHARGE

## 2023-05-15 RX ORDER — POTASSIUM PHOSPHATE, MONOBASIC POTASSIUM PHOSPHATE, DIBASIC 236; 224 MG/ML; MG/ML
30 INJECTION, SOLUTION INTRAVENOUS ONCE
Refills: 0 | Status: COMPLETED | OUTPATIENT
Start: 2023-05-15 | End: 2023-05-15

## 2023-05-15 RX ORDER — HEPARIN SODIUM 5000 [USP'U]/ML
5000 INJECTION INTRAVENOUS; SUBCUTANEOUS EVERY 6 HOURS
Refills: 0 | Status: DISCONTINUED | OUTPATIENT
Start: 2023-05-15 | End: 2023-05-15

## 2023-05-15 RX ORDER — SODIUM CHLORIDE 9 MG/ML
1000 INJECTION, SOLUTION INTRAVENOUS ONCE
Refills: 0 | Status: COMPLETED | OUTPATIENT
Start: 2023-05-15 | End: 2023-05-15

## 2023-05-15 RX ADMIN — Medication 400 MILLIGRAM(S): at 01:03

## 2023-05-15 RX ADMIN — Medication 6.17 MICROGRAM(S)/KG/MIN: at 10:18

## 2023-05-15 RX ADMIN — SODIUM CHLORIDE 1000 MILLILITER(S): 9 INJECTION INTRAMUSCULAR; INTRAVENOUS; SUBCUTANEOUS at 01:03

## 2023-05-15 RX ADMIN — PIPERACILLIN AND TAZOBACTAM 25 GRAM(S): 4; .5 INJECTION, POWDER, LYOPHILIZED, FOR SOLUTION INTRAVENOUS at 08:33

## 2023-05-15 RX ADMIN — PIPERACILLIN AND TAZOBACTAM 25 GRAM(S): 4; .5 INJECTION, POWDER, LYOPHILIZED, FOR SOLUTION INTRAVENOUS at 16:02

## 2023-05-15 RX ADMIN — SODIUM CHLORIDE 1000 MILLILITER(S): 9 INJECTION, SOLUTION INTRAVENOUS at 04:03

## 2023-05-15 RX ADMIN — OLANZAPINE 5 MILLIGRAM(S): 15 TABLET, FILM COATED ORAL at 11:43

## 2023-05-15 RX ADMIN — PIPERACILLIN AND TAZOBACTAM 200 GRAM(S): 4; .5 INJECTION, POWDER, LYOPHILIZED, FOR SOLUTION INTRAVENOUS at 01:40

## 2023-05-15 RX ADMIN — CHLORHEXIDINE GLUCONATE 1 APPLICATION(S): 213 SOLUTION TOPICAL at 07:17

## 2023-05-15 RX ADMIN — SODIUM CHLORIDE 50 MILLILITER(S): 9 INJECTION, SOLUTION INTRAVENOUS at 21:50

## 2023-05-15 RX ADMIN — Medication 100 MILLIEQUIVALENT(S): at 08:33

## 2023-05-15 RX ADMIN — Medication 40 MILLIEQUIVALENT(S): at 06:59

## 2023-05-15 RX ADMIN — POTASSIUM PHOSPHATE, MONOBASIC POTASSIUM PHOSPHATE, DIBASIC 83.33 MILLIMOLE(S): 236; 224 INJECTION, SOLUTION INTRAVENOUS at 21:50

## 2023-05-15 RX ADMIN — Medication 40 MILLIEQUIVALENT(S): at 08:33

## 2023-05-15 RX ADMIN — Medication 6.17 MICROGRAM(S)/KG/MIN: at 02:56

## 2023-05-15 RX ADMIN — Medication 1000 MILLIGRAM(S): at 08:00

## 2023-05-15 RX ADMIN — Medication 100 MILLIEQUIVALENT(S): at 09:55

## 2023-05-15 RX ADMIN — HEPARIN SODIUM 1100 UNIT(S)/HR: 5000 INJECTION INTRAVENOUS; SUBCUTANEOUS at 07:31

## 2023-05-15 RX ADMIN — PIPERACILLIN AND TAZOBACTAM 25 GRAM(S): 4; .5 INJECTION, POWDER, LYOPHILIZED, FOR SOLUTION INTRAVENOUS at 23:35

## 2023-05-15 RX ADMIN — PANTOPRAZOLE SODIUM 40 MILLIGRAM(S): 20 TABLET, DELAYED RELEASE ORAL at 15:57

## 2023-05-15 RX ADMIN — HEPARIN SODIUM 5000 UNIT(S): 5000 INJECTION INTRAVENOUS; SUBCUTANEOUS at 06:59

## 2023-05-15 NOTE — H&P ADULT - NSHPLABSRESULTS_GEN_ALL_CORE
Lab Results:  CBC  CBC Full  -  ( 14 May 2023 23:53 )  WBC Count : 15.64 K/uL  RBC Count : 4.12 M/uL  Hemoglobin : 11.8 g/dL  Hematocrit : 35.0 %  Platelet Count - Automated : 146 K/uL  Mean Cell Volume : 85.0 fl  Mean Cell Hemoglobin : 28.6 pg  Mean Cell Hemoglobin Concentration : 33.7 gm/dL  Auto Neutrophil # : 14.08 K/uL  Auto Lymphocyte # : 0.94 K/uL  Auto Monocyte # : 0.63 K/uL  Auto Eosinophil # : 0.00 K/uL  Auto Basophil # : 0.00 K/uL  Auto Neutrophil % : 71.0 %  Auto Lymphocyte % : 6.0 %  Auto Monocyte % : 4.0 %  Auto Eosinophil % : 0.0 %  Auto Basophil % : 0.0 %    .		Differential:	[] Automated		[] Manual  Chemistry                        11.8   15.64 )-----------( 146      ( 14 May 2023 23:53 )             35.0     05    142  |  102  |  32<H>  ----------------------------<  101<H>  3.1<L>   |  21<L>  |  2.55<H>    Ca    8.4<L>      14 May 2023 23:53  Mg     1.9         TPro  6.7  /  Alb  3.0<L>  /  TBili  3.2<H>  /  DBili  x   /  AST  353<H>  /  ALT  243<H>  /  AlkPhos  122<H>      LIVER FUNCTIONS - ( 14 May 2023 23:53 )  Alb: 3.0 g/dL / Pro: 6.7 gm/dL / ALK PHOS: 122 U/L / ALT: 243 U/L / AST: 353 U/L / GGT: x           PT/INR - ( 14 May 2023 23:53 )   PT: 20.8 sec;   INR: 1.78 ratio         PTT - ( 14 May 2023 23:53 )  PTT:35.6 sec  Urinalysis Basic - ( 15 May 2023 04:14 )    Color: Yellow / Appearance: Clear / S.005 / pH: x  Gluc: x / Ketone: Trace  / Bili: Moderate / Urobili: 4   Blood: x / Protein: 15 / Nitrite: Negative   Leuk Esterase: Trace / RBC: x / WBC x   Sq Epi: x / Non Sq Epi: x / Bacteria: x            MICROBIOLOGY/CULTURES:      RADIOLOGY RESULTS:

## 2023-05-15 NOTE — ED PROVIDER NOTE - OBJECTIVE STATEMENT
pt is a 73 yo hm with hx htn, mental illness, gastritis, who developed abd pain radiating to back on Saturday, associated with vomiting, fever and chills. No cough no dysuria. Nonsmoker. pt is a 71 yo hm with hx htn, mental illness, gastritis, who developed abd pain radiating to back on Saturday, associated with vomiting, fever and chills. No cough no dysuria. Nonsmoker.Pain is better now.

## 2023-05-15 NOTE — PROVIDER CONTACT NOTE (EICU) - RECOMMENDATIONS
-Agree with plan as documented in H&P 5/15/23 authored by LIZETH Rojas.  -teleICU team to continue to monitor and provide support for patient and bedside team as needed

## 2023-05-15 NOTE — ED ADULT NURSE NOTE - OBJECTIVE STATEMENT
Pt presents to ED from home c/o abdominal pain. Pt states he has had abdominal pain w/ n/v since Saturday. Pt also endorsing SOB and fevers. In ED pt o2, 99% on room air, respirations even and unlabored. Pt w/ 103 oral temperature. Bp 87/57. Pt a&ox4. Denies urinary s/s or diarrhea.

## 2023-05-15 NOTE — H&P ADULT - NSHPPHYSICALEXAM_GEN_ALL_CORE
PHYSICAL EXAM:    GENERAL: NAD.  HEAD:  Atraumatic, Normocephalic  EYES: EOMI, PERRLA, conjunctiva and sclera clear  ENMT: No tonsillar erythema, exudates, or enlargement; Moist mucous membranes, Good dentition, No lesions  NECK: Supple, No JVD, Normal thyroid  NERVOUS SYSTEM:  Alert & Oriented X3, Good concentration; Motor Strength 5/5 B/L upper and lower extremities; DTRs 2+ intact and symmetric  CHEST/LUNG: Clear to auscultation bilaterally; No rales, rhonchi, wheezing, or rubs  HEART: Regular rate and rhythm; No murmurs, rubs, or gallops  ABDOMEN: Soft, Nontender, Nondistended; Bowel sounds present  EXTREMITIES:  2+ Peripheral Pulses, No clubbing, cyanosis, or edema

## 2023-05-15 NOTE — H&P ADULT - ASSESSMENT
Assessment/Plan  71 y/o pmhx HTN, gastritis, psych history is accepted to CCU for active treatment of   1. Septic Shock 2/2  2. R/o Acute Cholecystits  3. Hepatic portal vein thrombosis  4. ALYSSA      Neuro: At baseline mental status. U tox sent off given hx of drug/ETOH use. Salicylate acetaminophen level.   Cardio: Septic shock in refractory to 3 L of IV crystalloid, started on levophed actively titrating to maintain MAP > 65. Bedside POCUS showed no gross depression of LV/RV, LV > RV, RV free wall moving appropriately IVC small around 1.2 cm and completely collapsable. 1 L of LR ordered. Formal TTE in AM. Lactate downtrending from 4.4 to 2.9 s/p fluids.   Pulm: Maintaining o2 sat on minimal O2 requirements. No other active issues.  GI: NPO. Abdominal pain on presentation taht has resolved, CT abd/pelvis shows findings of acute cholecystitis with no abdominal tendernes or peritoneal signs. Surgery consulted, no plan for urgent OR intervention. WIll likely need HIDA/ MRCP to r/o cholangitis, GI consult  Renal: ALYSSA with SCr 2.55 (baseline around 1.2) likely 2/2 prerenal shock state. No hydro noted on CT. S/p 3 L IVF, with 1 more ordered. I/O's.  monitor uop > .5 cc/kg/hr. Avoid nephrotoxic agents  Heme: CT abd/pelvis showed r intrahepatic portal vein thrombosis, started on hep gtt for AC. RUQ ultrasound with doppler ordered to further evaluate  ID; Elevated WBC 15 with L shift and 19 % bandemia with source possibly acute cholecystits vs cholangitis vs? UA clean. Started on empiric zosyn. Procal level sent off. F/u Ucx, Bcx.   Endo: No active issues  Dispo: Critically ill patient accepted to ICU for active treatment of S/S 2/2 r/o acute husam with course complicated by ALYSSA and portal hepatic vein thrombosis.       Case Discussed with eICU attending Dr Wood and plan agreed to.    Assessment/Plan  73 y/o pmhx HTN, gastritis, psych history is accepted to CCU for active treatment of   1. Septic Shock 2/2  2. R/o Acute Cholecystits  3. Hepatic portal vein thrombosis  4. ALYSSA  5. UTI      Neuro: At baseline mental status. U tox sent off given hx of drug/ETOH use. Salicylate acetaminophen level.   Cardio: Septic shock in refractory to 3 L of IV crystalloid, started on levophed actively titrating to maintain MAP > 65. Bedside POCUS showed no gross depression of LV/RV, LV > RV, RV free wall moving appropriately IVC small around 1.2 cm and completely collapsable. 1 L of LR ordered. Formal TTE in AM. Lactate downtrending from 4.4 to 2.9 s/p fluids.   Pulm: Maintaining o2 sat on minimal O2 requirements. No other active issues.  GI: NPO. Abdominal pain on presentation taht has resolved, CT abd/pelvis shows findings of acute cholecystitis with no abdominal tendernes or peritoneal signs. Surgery consulted, no plan for urgent OR intervention. WIll likely need HIDA/ MRCP to r/o cholangitis, GI consult  Renal: ALYSSA with SCr 2.55 (baseline around 1.2) likely 2/2 prerenal shock state. No hydro noted on CT. S/p 3 L IVF, with 1 more ordered. I/O's.  monitor uop > .5 cc/kg/hr. Avoid nephrotoxic agents  Heme: CT abd/pelvis showed r intrahepatic portal vein thrombosis, started on hep gtt for AC. RUQ ultrasound with doppler ordered to further evaluate  ID; Elevated WBC 15 with L shift and 19 % bandemia with source possibly acute cholecystits vs cholangitis vs UTI Started on empiric zosyn. Procal level sent off. F/u Ucx, Bcx.   Endo: No active issues  Dispo: Critically ill patient accepted to ICU for active treatment of S/S 2/2 r/o acute husam with course complicated by ALYSSA and portal hepatic vein thrombosis.       Case Discussed with eICU attending Dr Wood and plan agreed to.

## 2023-05-15 NOTE — ED ADULT NURSE NOTE - CHPI ED NUR SYMPTOMS NEG
Fetal nonstress test reactive. SVE 2-3/50/-3. Patient with intermittent contractions. DW them labor precautions and plan to call Monday if not delivered.
no burning urination/no dysuria/no hematuria

## 2023-05-15 NOTE — ED PROVIDER NOTE - PROGRESS NOTE DETAILS
Spoke to Dr. Zaldivar who will inform Dr. Beltre in a few hours. Angelic GUERRA Spoke to Dr. Zaldivar and discussed case. Suspect ascending cholangitis with sepsis. ICU pa is aware and en route to evaluate. Pt has received saline, antibiotics. Dr. Zaldivar  will inform Dr. Beltre in a few hours for possible ercp.. Angelic GUERRA

## 2023-05-15 NOTE — PROVIDER CONTACT NOTE (EICU) - ASSESSMENT
73 yo M with septic shock, portal vein thrombus. Case discussed with LIZETH Rojas. Please refer to H&P 5/15/23 authored by LIZETH Rojas for details of bedside evaluation and exam.

## 2023-05-15 NOTE — H&P ADULT - HISTORY OF PRESENT ILLNESS
HPI:   73 y/o pmhx HTN, gastritis, psych history presented to ED with x 2 days of abdominal pain. Associated vomiting, fever, chills. Hypotensive on admission to 70's systolic. Lab work significant for WBC 15.64 with L shift,, 19 % bandemia, Scr 2.55 ( baseline Scr 1.23 10/22), lactate  4.4, total bilirubin 3.2, alk phos 122, , . CT abd/pelvis significant for cholecystitis with gall bladder wall thickening, as well as R intrahepatic portal thrombosis.  Was given 3 L of IV fluids in ED without improvement in BP. ICU consulted for hypotension refractory IV crystalloid.

## 2023-05-15 NOTE — CONSULT NOTE ADULT - ASSESSMENT
72 year old male with acute abdominal pain, leukocytosis, hypotension, septic shock    Imp: cholecystitis--->very distended thickened gallbladder on CT with normal CBD  Low likelihood choledocholithiasis/cholangitis  +intrahepetic portal vein thrombosis    Waiting on MR to determine if ERCP necessary    Rec:  ::Continue UFH  ::Appreciate surgery eval and recs  ::Continue pressors/fluid support  ::Continue trend lfts  ::Appreciate heme/onc eval and recs 72 year old male with acute abdominal pain, leukocytosis, hypotension, septic shock    Imp: cholecystitis--->very distended thickened gallbladder on CT with normal CBD  r/o choledocholithiasis/cholangitis  +intrahepetic portal vein thrombosis    Waiting on MR to determine if ERCP necessary    Rec:  ::Continue UFH  ::Appreciate surgery eval and recs  ::Continue pressors/fluid support  ::Continue trend lfts  ::Appreciate heme/onc eval and recs

## 2023-05-15 NOTE — CONSULT NOTE ADULT - NS ATTEND AMEND GEN_ALL_CORE FT
per note in consult A/P
72 year old man with biliary sepsis.     MRCP shows obstructing stone.   For urgent ERCP today.   Surgery following.   Continue rescucitation.

## 2023-05-15 NOTE — ED PROVIDER NOTE - CRITICAL CARE ATTENDING CONTRIBUTION TO CARE
Critical care time spent evaluating and reevaluating patient, ordering and interpreting diagnostics, ordering therapeutics, speaking to pt and consulting and admitting MDs, reviewing previous records, and documenting. Angelic GUERRA

## 2023-05-15 NOTE — PATIENT PROFILE ADULT - PATIENT REPRESENTATIVE: ( YOU CAN CHOOSE ANY PERSON THAT CAN ASSIST YOU WITH YOUR HEALTH CARE PREFERENCES, DOES NOT HAVE TO BE A SPOUSE, IMMEDIATE FAMILY OR SIGNIFICANT OTHER/PARTNER)
Pt resting in stretcher. Call bell within reach. Updated on plan of care. Medicated for pain and initiated fluids. Dr. Ceci Kilpatrick at the bedside to update pt on plan of care. No other complaints voiced at this time. declines

## 2023-05-15 NOTE — PROGRESS NOTE ADULT - SUBJECTIVE AND OBJECTIVE BOX
Interval HPI:    Exam:    Radiology:     On Admission  05-15-23  HPI:  HPI:   73 y/o pmhx HTN, gastritis, psych history presented to ED with x 2 days of abdominal pain. Associated vomiting, fever, chills. Hypotensive on admission to 70's systolic. Lab work significant for WBC 15.64 with L shift,, 19 % bandemia, Scr 2.55 ( baseline Scr 1.23 10/22), lactate  4.4, total bilirubin 3.2, alk phos 122, , . CT abd/pelvis significant for cholecystitis with gall bladder wall thickening, as well as R intrahepatic portal thrombosis.  Was given 3 L of IV fluids in ED without improvement in BP. ICU consulted for hypotension refractory IV crystalloid.  (15 May 2023 05:37)    PAST MEDICAL & SURGICAL HISTORY:  No pertinent past medical history      Alcohol abuse      Drug abuse      Schizophrenia      Dementia      No significant past surgical history          Antimicrobial:    Cardiovascular:  norepinephrine Infusion 0.05 MICROgram(s)/kG/Min IV Continuous <Continuous>    Pulmonary:    Hematalogic:  heparin   Injectable 5000 Unit(s) IV Push every 6 hours PRN  heparin   Injectable 2500 Unit(s) IV Push every 6 hours PRN  heparin  Infusion.  Unit(s)/Hr IV Continuous <Continuous>    Other:  atorvastatin 10 milliGRAM(s) Oral at bedtime  chlorhexidine 4% Liquid 1 Application(s) Topical <User Schedule>  OLANZapine 5 milliGRAM(s) Oral daily  pantoprazole    Tablet 40 milliGRAM(s) Oral before breakfast      Drug Dosing Weight  Height (cm): 157.5 (14 May 2023 23:44)  Weight (kg): 60.8 (15 May 2023 05:00)  BMI (kg/m2): 24.5 (15 May 2023 05:00)  BSA (m2): 1.61 (15 May 2023 05:00)    T(C): 36.8 (05-15-23 @ 13:00), Max: 39.4 (23 @ 23:44)  HR: 95 (05-15-23 @ 12:30)  BP: 106/56 (05-15-23 @ 12:30)  BP(mean): 69 (05-15-23 @ 12:30)  ABP: --  ABP(mean): --  RR: 27 (05-15-23 @ 12:30)  SpO2: 97% (05-15-23 @ 12:30)           @ 07:01  -  05-15 @ 07:00  --------------------------------------------------------  IN: 0 mL / OUT: 1375 mL / NET: -1375 mL              LABS:  CBC Full  -  ( 15 May 2023 06:43 )  WBC Count : 21.81 K/uL  RBC Count : 4.13 M/uL  Hemoglobin : 11.7 g/dL  Hematocrit : 34.4 %  Platelet Count - Automated : 152 K/uL  Mean Cell Volume : 83.3 fl  Mean Cell Hemoglobin : 28.3 pg  Mean Cell Hemoglobin Concentration : 34.0 gm/dL  Auto Neutrophil # : x  Auto Lymphocyte # : x  Auto Monocyte # : x  Auto Eosinophil # : x  Auto Basophil # : x  Auto Neutrophil % : x  Auto Lymphocyte % : x  Auto Monocyte % : x  Auto Eosinophil % : x  Auto Basophil % : x    05-15    143  |  109<H>  |  30<H>  ----------------------------<  81  2.8<LL>   |  24  |  1.79<H>    Ca    7.7<L>      15 May 2023 06:43  Phos  2.2     05-15  Mg     2.1     05-15    TPro  6.4  /  Alb  2.9<L>  /  TBili  3.7<H>  /  DBili  x   /  AST  406<H>  /  ALT  261<H>  /  AlkPhos  103  05-15    PT/INR - ( 14 May 2023 23:53 )   PT: 20.8 sec;   INR: 1.78 ratio         PTT - ( 14 May 2023 23:53 )  PTT:35.6 sec  Urinalysis Basic - ( 15 May 2023 04:14 )    Color: Yellow / Appearance: Clear / S.005 / pH: x  Gluc: x / Ketone: Trace  / Bili: Moderate / Urobili: 4   Blood: x / Protein: 15 / Nitrite: Negative   Leuk Esterase: Trace / RBC: 3-5 /HPF / WBC 11-25 /HPF   Sq Epi: x / Non Sq Epi: x / Bacteria: Occasional        ____________________________________________________________________________________________________   Interval HPI:  decreasing pressor requirements  feels better, abd pain resolving   no diarrhea     Exam:  alert and oriented, nad  bilat air entry  reg rhythm  abd soft, +bs, no rebound, no RUQ tenderness  skin well perfused     Radiology: cxr - no large infiltrates or effusions     On Admission  05-15-23  HPI:  HPI:   71 y/o pmhx HTN, gastritis, psych history presented to ED with x 2 days of abdominal pain. Associated vomiting, fever, chills. Hypotensive on admission to 70's systolic. Lab work significant for WBC 15.64 with L shift,, 19 % bandemia, Scr 2.55 ( baseline Scr 1.23 10/22), lactate  4.4, total bilirubin 3.2, alk phos 122, , . CT abd/pelvis significant for cholecystitis with gall bladder wall thickening, as well as R intrahepatic portal thrombosis.  Was given 3 L of IV fluids in ED without improvement in BP. ICU consulted for hypotension refractory IV crystalloid.  (15 May 2023 05:37)    PAST MEDICAL & SURGICAL HISTORY:  No pertinent past medical history      Alcohol abuse      Drug abuse      Schizophrenia      Dementia      No significant past surgical history          Antimicrobial:    Cardiovascular:  norepinephrine Infusion 0.05 MICROgram(s)/kG/Min IV Continuous <Continuous>    Pulmonary:    Hematalogic:  heparin   Injectable 5000 Unit(s) IV Push every 6 hours PRN  heparin   Injectable 2500 Unit(s) IV Push every 6 hours PRN  heparin  Infusion.  Unit(s)/Hr IV Continuous <Continuous>    Other:  atorvastatin 10 milliGRAM(s) Oral at bedtime  chlorhexidine 4% Liquid 1 Application(s) Topical <User Schedule>  OLANZapine 5 milliGRAM(s) Oral daily  pantoprazole    Tablet 40 milliGRAM(s) Oral before breakfast      Drug Dosing Weight  Height (cm): 157.5 (14 May 2023 23:44)  Weight (kg): 60.8 (15 May 2023 05:00)  BMI (kg/m2): 24.5 (15 May 2023 05:00)  BSA (m2): 1.61 (15 May 2023 05:00)    T(C): 36.8 (05-15-23 @ 13:00), Max: 39.4 (23 @ 23:44)  HR: 95 (05-15-23 @ 12:30)  BP: 106/56 (05-15-23 @ 12:30)  BP(mean): 69 (05-15-23 @ 12:30)  ABP: --  ABP(mean): --  RR: 27 (05-15-23 @ 12:30)  SpO2: 97% (05-15-23 @ 12:30)           @ 07:01  -  05-15 @ 07:00  --------------------------------------------------------  IN: 0 mL / OUT: 1375 mL / NET: -1375 mL              LABS:  CBC Full  -  ( 15 May 2023 06:43 )  WBC Count : 21.81 K/uL  RBC Count : 4.13 M/uL  Hemoglobin : 11.7 g/dL  Hematocrit : 34.4 %  Platelet Count - Automated : 152 K/uL  Mean Cell Volume : 83.3 fl  Mean Cell Hemoglobin : 28.3 pg  Mean Cell Hemoglobin Concentration : 34.0 gm/dL  Auto Neutrophil # : x  Auto Lymphocyte # : x  Auto Monocyte # : x  Auto Eosinophil # : x  Auto Basophil # : x  Auto Neutrophil % : x  Auto Lymphocyte % : x  Auto Monocyte % : x  Auto Eosinophil % : x  Auto Basophil % : x    05-15    143  |  109<H>  |  30<H>  ----------------------------<  81  2.8<LL>   |  24  |  1.79<H>    Ca    7.7<L>      15 May 2023 06:43  Phos  2.2     05-15  Mg     2.1     05-15    TPro  6.4  /  Alb  2.9<L>  /  TBili  3.7<H>  /  DBili  x   /  AST  406<H>  /  ALT  261<H>  /  AlkPhos  103  -15    PT/INR - ( 14 May 2023 23:53 )   PT: 20.8 sec;   INR: 1.78 ratio         PTT - ( 14 May 2023 23:53 )  PTT:35.6 sec  Urinalysis Basic - ( 15 May 2023 04:14 )    Color: Yellow / Appearance: Clear / S.005 / pH: x  Gluc: x / Ketone: Trace  / Bili: Moderate / Urobili: 4   Blood: x / Protein: 15 / Nitrite: Negative   Leuk Esterase: Trace / RBC: 3-5 /HPF / WBC 11-25 /HPF   Sq Epi: x / Non Sq Epi: x / Bacteria: Occasional        ____________________________________________________________________________________________________

## 2023-05-15 NOTE — PATIENT PROFILE ADULT - FALL HARM RISK - HARM RISK INTERVENTIONS

## 2023-05-15 NOTE — PATIENT PROFILE ADULT - STATED REASON FOR ADMISSION
abd pain, nausea, vomiting
Detail Level: Detailed
General Sunscreen Counseling: I recommended a broad spectrum sunscreen with a SPF of 30 or higher.  I explained that SPF 30 sunscreens block approximately 97 percent of the sun's harmful rays.  Sunscreens should be applied at least 15 minutes prior to expected sun exposure and then every 2 hours after that as long as sun exposure continues. If swimming or exercising sunscreen should be reapplied every 45 minutes to an hour after getting wet or sweating. I also recommended a lip balm with a sunscreen as well. Sun protective clothing can be used in lieu of sunscreen but must be worn the entire time you are exposed to the sun's rays.

## 2023-05-15 NOTE — ED ADULT NURSE NOTE - NSFALLRISKINTERV_ED_ALL_ED

## 2023-05-15 NOTE — PROGRESS NOTE ADULT - CRITICAL CARE ATTENDING COMMENT
Critical care time spent titrating vasoactive medications,  interpreting chest imaging, managing volume status, treating renal failure and cardiovascular failure.

## 2023-05-15 NOTE — ED PROVIDER NOTE - CARE PLAN
Principal Discharge DX:	Sepsis   1 Principal Discharge DX:	Sepsis  Secondary Diagnosis:	Ascending cholangitis  Secondary Diagnosis:	Elevated liver enzymes  Secondary Diagnosis:	Acute renal failure (ARF)

## 2023-05-15 NOTE — PROVIDER CONTACT NOTE (EICU) - BACKGROUND
Following obtained via chart review and discussion with CC ASNDIP Rojas. 73 y/o pmhx HTN, gastritis, psych history presented to ED with x 2 days of abdominal pain. Associated vomiting, fever, chills. Hypotensive on admission to 70's systolic. Lab work significant for WBC 15.64 with L shift,, 19 % bandemia, Scr 2.55 ( baseline Scr 1.23 10/22), lactate  4.4, total bilirubin 3.2, alk phos 122, , . CT abd/pelvis significant for distended gallbladder with gallbladder wall edema. ? cholecystitis. Right intrahepatic portal vein thrombosis. Partial distention versus colitis of the ascending/transverse colon. Bilateral perinephric stranding without hydroureteronephrosis. Question striated bilateral nephrogram.  Was given 3 L of IV fluids in ED without improvement in BP, however lactate improved to 2.9. ICU consulted for hypotension refractory IV crystalloid. Patient started on norepinephrine infusion. Evaluated by surgery who recommended MRCP and medical optimization prior to cholecystectomy vs. percutaneous cholecystostomy due to concern for cholangitis. Patient started on zosyn and on heparin gtt for portal vein thrombus

## 2023-05-15 NOTE — CONSULT NOTE ADULT - SUBJECTIVE AND OBJECTIVE BOX
Patient is a 72y old  Male who presents with a chief complaint of Sepsis, Suspected Cholangitis / Cholecystitis    HPI:  This is a 72 year old male with significant past medical history of HTN, gastritis presenting to [facility] with two day history of abdominal pain. Per patient endorsement, pain located in upper abdomen radiating across abdomen and described as sharp with vomiting non bloody emesis, nausea, fever, and chills. Presented to ED with hypotension, systolic in 70's and leukocytosis. Denies any ill contacts, recent travel, or consumption of raw/undercooked meats. Denies any similar episodes in the past. Denies new medications, illicit drugs, or etoh. CT with significant gallbladder thickening, no stones, CBD 7mm and intrahepatic portal vein thrombosis.   MR performed this morning with images/report pending.        PAST MEDICAL & SURGICAL HISTORY:  No pertinent past medical history      Alcohol abuse      Drug abuse      Schizophrenia      Dementia      No significant past surgical history    MEDICATIONS  (STANDING):  chlorhexidine 4% Liquid 1 Application(s) Topical <User Schedule>  heparin  Infusion.  Unit(s)/Hr (11 mL/Hr) IV Continuous <Continuous>  norepinephrine Infusion 0.05 MICROgram(s)/kG/Min (6.17 mL/Hr) IV Continuous <Continuous>  OLANZapine 5 milliGRAM(s) Oral daily    MEDICATIONS  (PRN):  heparin   Injectable 5000 Unit(s) IV Push every 6 hours PRN For aPTT less than 40  heparin   Injectable 2500 Unit(s) IV Push every 6 hours PRN For aPTT between 40 - 57      Allergies    No Known Allergies    Intolerances    SOCIAL HISTORY:    FAMILY HISTORY:    REVIEW OF SYSTEMS:    CONSTITUTIONAL: No weakness, fevers or chills  EYES/ENT: No visual changes;  No vertigo or throat pain   NECK: No pain or stiffness  RESPIRATORY: No cough, wheezing, hemoptysis; No shortness of breath  CARDIOVASCULAR: No chest pain or palpitations  GASTROINTESTINAL: See HPI  GENITOURINARY: No dysuria, frequency or hematuria  NEUROLOGICAL: No numbness or weakness  SKIN: No itching, burning, rashes, or lesions   PSYCH: Normal mood and affect  All other review of systems is negative unless indicated above.    Vital Signs Last 24 Hrs  T(C): 36.3 (15 May 2023 09:29), Max: 39.4 (14 May 2023 23:44)  T(F): 97.3 (15 May 2023 09:29), Max: 103 (14 May 2023 23:44)  HR: 102 (15 May 2023 12:00) (78 - 122)  BP: 93/59 (15 May 2023 12:00) (69/50 - 132/89)  BP(mean): 66 (15 May 2023 12:00) (66 - 99)  RR: 15 (15 May 2023 12:00) (15 - 34)  SpO2: 100% (15 May 2023 12:00) (91% - 100%)    Parameters below as of 15 May 2023 09:00  Patient On (Oxygen Delivery Method): room air        PHYSICAL EXAM:    Constitutional: No acute distress, frail, non-toxic appearing  HEENT: masked, good phonation, icteric  Neck: supple, no lymphadenopathy  Respiratory: clear to ascultation bilaterally, no wheezing  Cardiovascular: S1 and S2, regular rate and rhythm, no murmurs rubs or gallops  Gastrointestinal: soft, mod tender diffusely, softly distended, +bowel sounds, no rebound or guarding, no surgical scars, no drains  Extremities: No peripheral edema, no cyanosis or clubbing  Vascular: 2+ peripheral pulses, no venous stasis  Neurological: A/O x 3, no focal deficits, no asterixis  Psychiatric: Normal mood, normal affect  Skin: No rashes, not jaundiced    LABS:                        11.7   21.81 )-----------( 152      ( 15 May 2023 06:43 )             34.4     05-15    143  |  109<H>  |  30<H>  ----------------------------<  81  2.8<LL>   |  24  |  1.79<H>    Ca    7.7<L>      15 May 2023 06:43  Phos  2.2     05-15  Mg     2.1     05-15    TPro  6.4  /  Alb  2.9<L>  /  TBili  3.7<H>  /  DBili  x   /  AST  406<H>  /  ALT  261<H>  /  AlkPhos  103  05-15    PT/INR - ( 14 May 2023 23:53 )   PT: 20.8 sec;   INR: 1.78 ratio         PTT - ( 14 May 2023 23:53 )  PTT:35.6 sec  LIVER FUNCTIONS - ( 15 May 2023 06:43 )  Alb: 2.9 g/dL / Pro: 6.4 gm/dL / ALK PHOS: 103 U/L / ALT: 261 U/L / AST: 406 U/L / GGT: x             RADIOLOGY & ADDITIONAL STUDIES:  IMPRESSION:    Distended gallbladder with gallbladder wall edema. ? cholecystitis.   Recommend correlation and additional imaging.    Right intrahepatic portal vein thrombosis.    Partial distention versus colitis of the ascending/transverse colon.   Recommend clinical correlation.    Bilateral perinephric stranding without hydroureteronephrosis. Question   striated bilateral nephrogram. Recommend clinical correlation to assess   urinary tract infection. Patient is a 72y old  Male who presents with a chief complaint of Sepsis, Suspected Cholangitis / Cholecystitis    72year old man with prior ETOH abuse, schizophrenia, HTN, admitted with biliary sepsis.     Patient states (and story coroborated by son) that two days ago began to have intermittent abdominal pain. pain is sharp, 8/10, epigastric, radiates across abdomen, sudden onset and offset, without known exacerbating or allevating factors. Associated with nausea/vomiting, fevers and chills. Denies any recent travel history or sock contacts. Denies consumption of raw/undercooked meats. Denies any similar episodes in the past. Denies new medications, illicit drugs, or etoh. CT with significant gallbladder thickening, no stones, CBD 7mm and intrahepatic portal vein thrombosis. MR performed this morning.       PAST MEDICAL & SURGICAL HISTORY:    Alcohol abuse      Drug abuse      Schizophrenia      Dementia      No significant past surgical history    MEDICATIONS  (STANDING):  chlorhexidine 4% Liquid 1 Application(s) Topical <User Schedule>  heparin  Infusion.  Unit(s)/Hr (11 mL/Hr) IV Continuous <Continuous>  norepinephrine Infusion 0.05 MICROgram(s)/kG/Min (6.17 mL/Hr) IV Continuous <Continuous>  OLANZapine 5 milliGRAM(s) Oral daily    MEDICATIONS  (PRN):  heparin   Injectable 5000 Unit(s) IV Push every 6 hours PRN For aPTT less than 40  heparin   Injectable 2500 Unit(s) IV Push every 6 hours PRN For aPTT between 40 - 57      Allergies    No Known Allergies    Intolerances    SOCIAL HISTORY:  no current ETOH, smoking or drugs    FAMILY HISTORY:  no colon or stomach cancer    REVIEW OF SYSTEMS:    CONSTITUTIONAL: No weakness, fevers or chills  EYES/ENT: No visual changes;  No vertigo or throat pain   NECK: No pain or stiffness  RESPIRATORY: No cough, wheezing, hemoptysis; No shortness of breath  CARDIOVASCULAR: No chest pain or palpitations  GASTROINTESTINAL: See HPI  GENITOURINARY: No dysuria, frequency or hematuria  NEUROLOGICAL: No numbness or weakness  SKIN: No itching, burning, rashes, or lesions   PSYCH: Normal mood and affect  All other review of systems is negative unless indicated above.    Vital Signs Last 24 Hrs  T(C): 36.3 (15 May 2023 09:29), Max: 39.4 (14 May 2023 23:44)  T(F): 97.3 (15 May 2023 09:29), Max: 103 (14 May 2023 23:44)  HR: 102 (15 May 2023 12:00) (78 - 122)  BP: 93/59 (15 May 2023 12:00) (69/50 - 132/89)  BP(mean): 66 (15 May 2023 12:00) (66 - 99)  RR: 15 (15 May 2023 12:00) (15 - 34)  SpO2: 100% (15 May 2023 12:00) (91% - 100%)    Parameters below as of 15 May 2023 09:00  Patient On (Oxygen Delivery Method): room air        PHYSICAL EXAM:    Constitutional: No acute distress, frail, non-toxic appearing, pleasant  HEENT: unmasked, good phonation, icteric  Neck: supple, no lymphadenopathy  Respiratory: clear to ascultation bilaterally, no wheezing  Cardiovascular: S1 and S2, regular rate and rhythm, no murmurs rubs or gallops  Gastrointestinal: soft, mod tender diffusely, softly distended, +bowel sounds, no rebound or guarding,   Extremities: No peripheral edema, no cyanosis or clubbing  Vascular: 2+ peripheral pulses, no venous stasis  Neurological: A/O x 3, no focal deficits, no asterixis  Psychiatric: Normal mood, normal affect  Skin: No rashes, not jaundiced    LABS:                        11.7   21.81 )-----------( 152      ( 15 May 2023 06:43 )             34.4     05-15    143  |  109<H>  |  30<H>  ----------------------------<  81  2.8<LL>   |  24  |  1.79<H>    Ca    7.7<L>      15 May 2023 06:43  Phos  2.2     05-15  Mg     2.1     05-15    TPro  6.4  /  Alb  2.9<L>  /  TBili  3.7<H>  /  DBili  x   /  AST  406<H>  /  ALT  261<H>  /  AlkPhos  103  05-15    PT/INR - ( 14 May 2023 23:53 )   PT: 20.8 sec;   INR: 1.78 ratio         PTT - ( 14 May 2023 23:53 )  PTT:35.6 sec  LIVER FUNCTIONS - ( 15 May 2023 06:43 )  Alb: 2.9 g/dL / Pro: 6.4 gm/dL / ALK PHOS: 103 U/L / ALT: 261 U/L / AST: 406 U/L / GGT: x             RADIOLOGY & ADDITIONAL STUDIES:  IMPRESSION:    Distended gallbladder with gallbladder wall edema. ? cholecystitis.   Recommend correlation and additional imaging.    Right intrahepatic portal vein thrombosis.    Partial distention versus colitis of the ascending/transverse colon.   Recommend clinical correlation.    Bilateral perinephric stranding without hydroureteronephrosis. Question   striated bilateral nephrogram. Recommend clinical correlation to assess   urinary tract infection. Patient is a 72y old  Male who presents with a chief complaint of Sepsis, Suspected Cholangitis / Cholecystitis    72year old man with prior ETOH abuse, schizophrenia, HTN, admitted with biliary sepsis.     Patient states (and story coroborated by son) that two days ago began to have intermittent abdominal pain. pain is sharp, 8/10, epigastric, radiates across abdomen, sudden onset and offset, without known exacerbating or allevating factors. Associated with nausea/vomiting, fevers and chills. Denies any recent travel history or sock contacts. Denies consumption of raw/undercooked meats. Denies any similar episodes in the past. Denies new medications, illicit drugs, or etoh. CT with significant gallbladder thickening, no stones, CBD 7mm and intrahepatic portal vein thrombosis. MR performed this morning.       PAST MEDICAL & SURGICAL HISTORY:    Alcohol abuse      Drug abuse      Schizophrenia      Dementia      No significant past surgical history    MEDICATIONS  (STANDING):  chlorhexidine 4% Liquid 1 Application(s) Topical <User Schedule>  heparin  Infusion.  Unit(s)/Hr (11 mL/Hr) IV Continuous <Continuous>  norepinephrine Infusion 0.05 MICROgram(s)/kG/Min (6.17 mL/Hr) IV Continuous <Continuous>  OLANZapine 5 milliGRAM(s) Oral daily    MEDICATIONS  (PRN):  heparin   Injectable 5000 Unit(s) IV Push every 6 hours PRN For aPTT less than 40  heparin   Injectable 2500 Unit(s) IV Push every 6 hours PRN For aPTT between 40 - 57      Allergies    No Known Allergies    Intolerances    SOCIAL HISTORY:  no current ETOH, smoking or drugs    FAMILY HISTORY:  no colon or stomach cancer    REVIEW OF SYSTEMS:    CONSTITUTIONAL: +weakness, +fevers + chills  EYES/ENT: No visual changes;  No vertigo or throat pain   NECK: No pain or stiffness  RESPIRATORY: No cough, wheezing, hemoptysis; No shortness of breath  CARDIOVASCULAR: No chest pain or palpitations  GASTROINTESTINAL: See HPI  GENITOURINARY: No dysuria, frequency or hematuria  NEUROLOGICAL: No numbness or weakness  SKIN: No itching, burning, rashes, or lesions   PSYCH: Normal mood and affect  All other review of systems is negative unless indicated above.    Vital Signs Last 24 Hrs  T(C): 36.3 (15 May 2023 09:29), Max: 39.4 (14 May 2023 23:44)  T(F): 97.3 (15 May 2023 09:29), Max: 103 (14 May 2023 23:44)  HR: 102 (15 May 2023 12:00) (78 - 122)  BP: 93/59 (15 May 2023 12:00) (69/50 - 132/89)  BP(mean): 66 (15 May 2023 12:00) (66 - 99)  RR: 15 (15 May 2023 12:00) (15 - 34)  SpO2: 100% (15 May 2023 12:00) (91% - 100%)    Parameters below as of 15 May 2023 09:00  Patient On (Oxygen Delivery Method): room air        PHYSICAL EXAM:    Constitutional: No acute distress, frail, non-toxic appearing, pleasant  HEENT: unmasked, good phonation, icteric  Neck: supple, no lymphadenopathy  Respiratory: clear to ascultation bilaterally, no wheezing  Cardiovascular: S1 and S2, regular rate and rhythm, no murmurs rubs or gallops  Gastrointestinal: soft, mod tender diffusely, softly distended, +bowel sounds, no rebound or guarding,   Extremities: No peripheral edema, no cyanosis or clubbing  Vascular: 2+ peripheral pulses, no venous stasis  Neurological: A/O x 3, no focal deficits, no asterixis  Psychiatric: Normal mood, normal affect  Skin: No rashes, not jaundiced    LABS:                        11.7   21.81 )-----------( 152      ( 15 May 2023 06:43 )             34.4     05-15    143  |  109<H>  |  30<H>  ----------------------------<  81  2.8<LL>   |  24  |  1.79<H>    Ca    7.7<L>      15 May 2023 06:43  Phos  2.2     05-15  Mg     2.1     05-15    TPro  6.4  /  Alb  2.9<L>  /  TBili  3.7<H>  /  DBili  x   /  AST  406<H>  /  ALT  261<H>  /  AlkPhos  103  05-15    PT/INR - ( 14 May 2023 23:53 )   PT: 20.8 sec;   INR: 1.78 ratio         PTT - ( 14 May 2023 23:53 )  PTT:35.6 sec  LIVER FUNCTIONS - ( 15 May 2023 06:43 )  Alb: 2.9 g/dL / Pro: 6.4 gm/dL / ALK PHOS: 103 U/L / ALT: 261 U/L / AST: 406 U/L / GGT: x             RADIOLOGY & ADDITIONAL STUDIES:  IMPRESSION:    Distended gallbladder with gallbladder wall edema. ? cholecystitis.   Recommend correlation and additional imaging.    Right intrahepatic portal vein thrombosis.    Partial distention versus colitis of the ascending/transverse colon.   Recommend clinical correlation.    Bilateral perinephric stranding without hydroureteronephrosis. Question   striated bilateral nephrogram. Recommend clinical correlation to assess   urinary tract infection.

## 2023-05-15 NOTE — CONSULT NOTE ADULT - ASSESSMENT
73 y/o M presented to the ED with ongoing abdominal pain with associated NV & fever/chills found to have hypotension & leukocytosis with concern for acute husam on imaging.    # R Intrahepatic Portal Vein Thrombosis in setting of Sepsis    - CT imaging revealed distended gallbladder with gallbladder wall edema. ? cholecystitis. Right intrahepatic portal vein thrombosis. Partial distention versus colitis of the ascending/transverse colon. Bilateral perinephric stranding without hydroureteronephrosis. Question striated bilateral nephrogram. Recommend clinical correlation to assess urinary tract infection.  - Started on Heparin gtt  - Likely 2/2 acute infectious / inflammatory process; hypercoagulable workup should be deferred at this time as will likely generate false positive results   - Abdominal US pending  - GI consulted  - Surgery following; plan for MRCP, LFT trends, serial abdominal exams ---> anticipated cholecystectomy vs husam tube once medically optimized / stable  - Continue supportive measures by CCU team  - Will discuss further with attending Dr Owen 73 y/o M presented to the ED with ongoing abdominal pain with associated NV & fever/chills found to have hypotension & leukocytosis with concern for acute husam on imaging.    # R Intrahepatic Portal Vein Thrombosis in setting of Sepsis    - CT imaging revealed distended gallbladder with gallbladder wall edema. ? cholecystitis. Right intrahepatic portal vein thrombosis. Partial distention versus colitis of the ascending/transverse colon. Bilateral perinephric stranding without hydroureteronephrosis. Question striated bilateral nephrogram. Recommend clinical correlation to assess urinary tract infection.  - Started on Heparin gtt ---> continue at this time  - Likely 2/2 acute infectious / inflammatory process; hypercoagulable workup should be deferred at this time as will likely generate false positive results   - Abdominal US pending  - GI consulted; await MRCP to determine need for further intervention  - Surgery following; plan for MRCP, LFT trends, serial abdominal exams ---> anticipated cholecystectomy vs husam tube once medically optimized / stable  - Continue management and supportive measures per CCU and Surgical teams  71 y/o M presented to the ED with ongoing abdominal pain with associated NV & fever/chills found to have hypotension & leukocytosis with concern for acute husam on imaging.    # R Intrahepatic Portal Vein Thrombosis in setting of Sepsis    - CT imaging revealed distended gallbladder with gallbladder wall edema. ? cholecystitis. Right intrahepatic portal vein thrombosis. Partial distention versus colitis of the ascending/transverse colon. Bilateral perinephric stranding without hydroureteronephrosis. Question striated bilateral nephrogram. Recommend clinical correlation to assess urinary tract infection.  - Started on Heparin gtt ---> continue at this time  - Likely 2/2 acute infectious / inflammatory process; hypercoagulable workup should be deferred at this time as will likely generate false positive results   - Abdominal US pending  - GI consulted; await MRCP to determine need for further intervention  - Surgery following; plan for MRCP, LFT trends, serial abdominal exams ---> anticipated cholecystectomy vs husam tube once medically optimized / stable  - Continue management and supportive measures per CCU and Surgical teams     Addendum Hem Onc attending.  Patient evaluated.  71 y/o male with PMHx of HTN , psychiatric illness, gastritis admitted with abdominal pain and found to have cholangitis, septic shock. CT scan showed distended gall bladder with gall bladder wall edema and right intrahepatic portal vein thrombosis.   Septic portal vein thrombosis. Anticoagulation x 3-6 months ( if tolerated ) and treatment of underlying medical condition- on iv antibiotics / GI evaluation MRCP planned.   Anticoagulation on iv heparin for now- to be changed to DOAC later when stable and no surgical intervention needed.

## 2023-05-15 NOTE — CONSULT NOTE ADULT - SUBJECTIVE AND OBJECTIVE BOX
HPI:    71 y/o M with a pmhx HTN, gastritis, psych history presented to ED with x 2 days of abdominal pain. Associated vomiting, fever, chills. Hypotensive on admission to 70's systolic. Lab work significant for WBC 15.64 with L shift,, 19 % bandemia, Scr 2.55 ( baseline Scr 1.23 10/22), lactate  4.4, total bilirubin 3.2, alk phos 122, , . CT abd/pelvis significant for cholecystitis with gall bladder wall thickening, as well as R intrahepatic portal thrombosis.  Was given 3 L of IV fluids in ED without improvement in BP. ICU consulted for hypotension refractory IV crystalloid.  (15 May 2023 05:37)    05/15/2023:      PAST MEDICAL & SURGICAL HISTORY:    Alcohol abuse    Drug abuse    Schizophrenia    Dementia        MEDICATIONS  (STANDING):    chlorhexidine 4% Liquid 1 Application(s) Topical <User Schedule>  heparin  Infusion.  Unit(s)/Hr (11 mL/Hr) IV Continuous <Continuous>  norepinephrine Infusion 0.05 MICROgram(s)/kG/Min (6.17 mL/Hr) IV Continuous <Continuous>  OLANZapine 5 milliGRAM(s) Oral daily      MEDICATIONS  (PRN):    heparin   Injectable 5000 Unit(s) IV Push every 6 hours PRN For aPTT less than 40  heparin   Injectable 2500 Unit(s) IV Push every 6 hours PRN For aPTT between 40 - 57      ALLERGIES:    No Known Allergies      REVIEW OF SYSTEMS:    Constitutional, Eyes, ENT, Cardiovascular, Respiratory, Gastrointestinal, Genitourinary, Musculoskeletal, Integumentary, Neurological, Psychiatric, Endocrine, Heme/Lymph and Allergic/Immunologic review of systems are otherwise negative except as noted in HPI.       VITALS:    T(C): 36.3 (15 May 2023 09:29), Max: 39.4 (14 May 2023 23:44)  T(F): 97.3 (15 May 2023 09:29), Max: 103 (14 May 2023 23:44)  HR: 100 (15 May 2023 11:00) (78 - 122)  BP: 119/69 (15 May 2023 11:00) (69/50 - 132/89)  BP(mean): 80 (15 May 2023 11:00) (68 - 99)  RR: 23 (15 May 2023 11:00) (17 - 34)  SpO2: 91% (15 May 2023 11:00) (91% - 99%)    Parameters below as of 15 May 2023 09:00  Patient On (Oxygen Delivery Method): room air        PHYSICAL:        LABS:    CBC Full  -  ( 15 May 2023 06:43 )  WBC Count : 21.81 K/uL  RBC Count : 4.13 M/uL  Hemoglobin : 11.7 g/dL  Hematocrit : 34.4 %  Platelet Count - Automated : 152 K/uL  Mean Cell Volume : 83.3 fl  Mean Cell Hemoglobin : 28.3 pg  Mean Cell Hemoglobin Concentration : 34.0 gm/dL  Auto Neutrophil # : x  Auto Lymphocyte # : x  Auto Monocyte # : x  Auto Eosinophil # : x  Auto Basophil # : x  Auto Neutrophil % : x  Auto Lymphocyte % : x  Auto Monocyte % : x  Auto Eosinophil % : x  Auto Basophil % : x    05-15    143  |  109<H>  |  30<H>  ----------------------------<  81  2.8<LL>   |  24  |  1.79<H>    Ca    7.7<L>      15 May 2023 06:43  Phos  2.2     05-15  Mg     2.1     -15    TPro  6.4  /  Alb  2.9<L>  /  TBili  3.7<H>  /  DBili  x   /  AST  406<H>  /  ALT  261<H>  /  AlkPhos  103  05-15    PT/INR - ( 14 May 2023 23:53 )   PT: 20.8 sec;   INR: 1.78 ratio         PTT - ( 14 May 2023 23:53 )  PTT:35.6 sec  Urinalysis Basic - ( 15 May 2023 04:14 )    Color: Yellow / Appearance: Clear / S.005 / pH: x  Gluc: x / Ketone: Trace  / Bili: Moderate / Urobili: 4   Blood: x / Protein: 15 / Nitrite: Negative   Leuk Esterase: Trace / RBC: 3-5 /HPF / WBC 11-25 /HPF   Sq Epi: x / Non Sq Epi: x / Bacteria: Occasional        RADIOLOGY & ADDITIONAL STUDIES:      CT Abdomen and Pelvis w/ IV Cont (05.15.23 @ 01:16)    FINDINGS:    LOWER CHEST: Atelectasis. Aortic root calcification.    LIVER: No obvious lesion.  BILE DUCTS/GALLBLADDER: No obvious intraparenchymal dilatation. Stable   common bile duct (0.7 cm). Distended gallbladder with gallbladder wall   edema.  PANCREAS: Stable duct.  SPLEEN: Unremarkable.    ADRENALS: Unremarkable.  KIDNEYS/URETERS: Bilateral perinephric stranding without   hydroureteronephrosis. Question striated bilateral nephrogram. Stable   indeterminate right renal lesion and hypodensities.  BLADDER: Partially distended.  REPRODUCTIVE ORGANS: Normal size of the prostate.    BOWEL: No bowel obstruction. Unremarkable appendix. Sigmoid colon   anastomosis. Colon diverticulosis. Wall thickening of the cecum,   ascending and transverse colon with slight stranding.  PERITONEUM: No drainable fluid collection or free air.  VESSELS: Atherosclerosis. Normal caliber of the abdominal aorta.   Hypodensities at the anterior branches of the right intrahepatic portal   vein, likely thrombus. Patent portal vein, hepatic veins, SMV and splenic   vein.  RETROPERITONEUM: Subcentimeter lymph nodes without lymphadenopathy.  ABDOMINAL WALL/SOFT TISSUES: Small fat-containing umbilical hernia.  BONES: Degenerative changes of the spine. Nonspecific small sclerotic   foci at the pelvic bones.    IMPRESSION:    Distended gallbladder with gallbladder wall edema. ? cholecystitis.   Recommend correlation and additional imaging.    Right intrahepatic portal vein thrombosis.    Partial distention versus colitis of the ascending/transverse colon.   Recommend clinical correlation.    Bilateral perinephric stranding without hydroureteronephrosis. Question   striated bilateral nephrogram. Recommend clinical correlation to assess   urinary tract infection.    Additional findings as described.       HPI:    73 y/o M with a pmhx HTN, gastritis, psych history presented to ED with x 2 days of abdominal pain. Associated vomiting, fever, chills. Hypotensive on admission to 70's systolic. Lab work significant for WBC 15.64 with L shift,, 19 % bandemia, Scr 2.55 ( baseline Scr 1.23 10/22), lactate  4.4, total bilirubin 3.2, alk phos 122, , . CT abd/pelvis significant for cholecystitis with gall bladder wall thickening, as well as R intrahepatic portal thrombosis.  Was given 3 L of IV fluids in ED without improvement in BP. ICU consulted for hypotension refractory IV crystalloid.  (15 May 2023 05:37)    05/15/2023: Patient away from bed receiving imaging study, will check back later today      PAST MEDICAL & SURGICAL HISTORY:    Alcohol abuse    Drug abuse    Schizophrenia    Dementia        MEDICATIONS  (STANDING):    chlorhexidine 4% Liquid 1 Application(s) Topical <User Schedule>  heparin  Infusion.  Unit(s)/Hr (11 mL/Hr) IV Continuous <Continuous>  norepinephrine Infusion 0.05 MICROgram(s)/kG/Min (6.17 mL/Hr) IV Continuous <Continuous>  OLANZapine 5 milliGRAM(s) Oral daily      MEDICATIONS  (PRN):    heparin   Injectable 5000 Unit(s) IV Push every 6 hours PRN For aPTT less than 40  heparin   Injectable 2500 Unit(s) IV Push every 6 hours PRN For aPTT between 40 - 57      ALLERGIES:    No Known Allergies      REVIEW OF SYSTEMS:    Constitutional, Eyes, ENT, Cardiovascular, Respiratory, Gastrointestinal, Genitourinary, Musculoskeletal, Integumentary, Neurological, Psychiatric, Endocrine, Heme/Lymph and Allergic/Immunologic review of systems are otherwise negative except as noted in HPI.       VITALS:    T(C): 36.3 (15 May 2023 09:29), Max: 39.4 (14 May 2023 23:44)  T(F): 97.3 (15 May 2023 09:29), Max: 103 (14 May 2023 23:44)  HR: 100 (15 May 2023 11:00) (78 - 122)  BP: 119/69 (15 May 2023 11:00) (69/50 - 132/89)  BP(mean): 80 (15 May 2023 11:00) (68 - 99)  RR: 23 (15 May 2023 11:00) (17 - 34)  SpO2: 91% (15 May 2023 11:00) (91% - 99%)    Parameters below as of 15 May 2023 09:00  Patient On (Oxygen Delivery Method): room air        PHYSICAL:        LABS:    CBC Full  -  ( 15 May 2023 06:43 )  WBC Count : 21.81 K/uL  RBC Count : 4.13 M/uL  Hemoglobin : 11.7 g/dL  Hematocrit : 34.4 %  Platelet Count - Automated : 152 K/uL  Mean Cell Volume : 83.3 fl  Mean Cell Hemoglobin : 28.3 pg  Mean Cell Hemoglobin Concentration : 34.0 gm/dL  Auto Neutrophil # : x  Auto Lymphocyte # : x  Auto Monocyte # : x  Auto Eosinophil # : x  Auto Basophil # : x  Auto Neutrophil % : x  Auto Lymphocyte % : x  Auto Monocyte % : x  Auto Eosinophil % : x  Auto Basophil % : x    05-15    143  |  109<H>  |  30<H>  ----------------------------<  81  2.8<LL>   |  24  |  1.79<H>    Ca    7.7<L>      15 May 2023 06:43  Phos  2.2     05-15  Mg     2.1     05-15    TPro  6.4  /  Alb  2.9<L>  /  TBili  3.7<H>  /  DBili  x   /  AST  406<H>  /  ALT  261<H>  /  AlkPhos  103  05-15    PT/INR - ( 14 May 2023 23:53 )   PT: 20.8 sec;   INR: 1.78 ratio         PTT - ( 14 May 2023 23:53 )  PTT:35.6 sec  Urinalysis Basic - ( 15 May 2023 04:14 )    Color: Yellow / Appearance: Clear / S.005 / pH: x  Gluc: x / Ketone: Trace  / Bili: Moderate / Urobili: 4   Blood: x / Protein: 15 / Nitrite: Negative   Leuk Esterase: Trace / RBC: 3-5 /HPF / WBC 11-25 /HPF   Sq Epi: x / Non Sq Epi: x / Bacteria: Occasional        RADIOLOGY & ADDITIONAL STUDIES:      CT Abdomen and Pelvis w/ IV Cont (05.15.23 @ 01:16)    FINDINGS:    LOWER CHEST: Atelectasis. Aortic root calcification.    LIVER: No obvious lesion.  BILE DUCTS/GALLBLADDER: No obvious intraparenchymal dilatation. Stable   common bile duct (0.7 cm). Distended gallbladder with gallbladder wall   edema.  PANCREAS: Stable duct.  SPLEEN: Unremarkable.    ADRENALS: Unremarkable.  KIDNEYS/URETERS: Bilateral perinephric stranding without   hydroureteronephrosis. Question striated bilateral nephrogram. Stable   indeterminate right renal lesion and hypodensities.  BLADDER: Partially distended.  REPRODUCTIVE ORGANS: Normal size of the prostate.    BOWEL: No bowel obstruction. Unremarkable appendix. Sigmoid colon   anastomosis. Colon diverticulosis. Wall thickening of the cecum,   ascending and transverse colon with slight stranding.  PERITONEUM: No drainable fluid collection or free air.  VESSELS: Atherosclerosis. Normal caliber of the abdominal aorta.   Hypodensities at the anterior branches of the right intrahepatic portal   vein, likely thrombus. Patent portal vein, hepatic veins, SMV and splenic   vein.  RETROPERITONEUM: Subcentimeter lymph nodes without lymphadenopathy.  ABDOMINAL WALL/SOFT TISSUES: Small fat-containing umbilical hernia.  BONES: Degenerative changes of the spine. Nonspecific small sclerotic   foci at the pelvic bones.    IMPRESSION:    Distended gallbladder with gallbladder wall edema. ? cholecystitis.   Recommend correlation and additional imaging.    Right intrahepatic portal vein thrombosis.    Partial distention versus colitis of the ascending/transverse colon.   Recommend clinical correlation.    Bilateral perinephric stranding without hydroureteronephrosis. Question   striated bilateral nephrogram. Recommend clinical correlation to assess   urinary tract infection.    Additional findings as described.       HPI:    71 y/o M with a pmhx HTN, gastritis, psych history presented to ED with x 2 days of abdominal pain. Associated vomiting, fever, chills. Hypotensive on admission to 70's systolic. Lab work significant for WBC 15.64 with L shift,, 19 % bandemia, Scr 2.55 ( baseline Scr 1.23 10/22), lactate  4.4, total bilirubin 3.2, alk phos 122, , . CT abd/pelvis significant for cholecystitis with gall bladder wall thickening, as well as R intrahepatic portal thrombosis.  Was given 3 L of IV fluids in ED without improvement in BP. ICU consulted for hypotension refractory IV crystalloid.  (15 May 2023 05:37)    05/15/2023: Seen at bedside, no acute distress      PAST MEDICAL & SURGICAL HISTORY:    Alcohol abuse    Drug abuse    Schizophrenia    Dementia        MEDICATIONS  (STANDING):    chlorhexidine 4% Liquid 1 Application(s) Topical <User Schedule>  heparin  Infusion.  Unit(s)/Hr (11 mL/Hr) IV Continuous <Continuous>  norepinephrine Infusion 0.05 MICROgram(s)/kG/Min (6.17 mL/Hr) IV Continuous <Continuous>  OLANZapine 5 milliGRAM(s) Oral daily      MEDICATIONS  (PRN):    heparin   Injectable 5000 Unit(s) IV Push every 6 hours PRN For aPTT less than 40  heparin   Injectable 2500 Unit(s) IV Push every 6 hours PRN For aPTT between 40 - 57      ALLERGIES:    No Known Allergies      REVIEW OF SYSTEMS:    Constitutional, Eyes, ENT, Cardiovascular, Respiratory, Gastrointestinal, Genitourinary, Musculoskeletal, Integumentary, Neurological, Psychiatric, Endocrine, Heme/Lymph and Allergic/Immunologic review of systems are otherwise negative except as noted in HPI.       VITALS:    T(C): 36.3 (15 May 2023 09:29), Max: 39.4 (14 May 2023 23:44)  T(F): 97.3 (15 May 2023 09:29), Max: 103 (14 May 2023 23:44)  HR: 100 (15 May 2023 11:00) (78 - 122)  BP: 119/69 (15 May 2023 11:00) (69/50 - 132/89)  BP(mean): 80 (15 May 2023 11:00) (68 - 99)  RR: 23 (15 May 2023 11:00) (17 - 34)  SpO2: 91% (15 May 2023 11:00) (91% - 99%)    Parameters below as of 15 May 2023 09:00  Patient On (Oxygen Delivery Method): room air        PHYSICAL:    Constitutional: no acute distress  Eyes: PERRL, EOMI  ENT: pharynx is unremarkable  Neck: supple without JVD  Pulmonary: clear to auscultation bilaterally, no dullness, no wheezing  Cardiac: RRR, normal S1S2  Vascular: no calf tenderness, venous stasis changes, varices   Abdomen: normoactive bowel sounds, soft and nontender, no hepatosplenomegaly or masses appreciated   Lymphatic: no peripheral adenopathy appreciated  Musculoskeletal: full range of motion and no deformities appreciated  Skin: normal appearance, no rash/erythema  Neurology: grossly intact  Psychiatric: affect appropriate      LABS:    CBC Full  -  ( 15 May 2023 06:43 )  WBC Count : 21.81 K/uL  RBC Count : 4.13 M/uL  Hemoglobin : 11.7 g/dL  Hematocrit : 34.4 %  Platelet Count - Automated : 152 K/uL  Mean Cell Volume : 83.3 fl  Mean Cell Hemoglobin : 28.3 pg  Mean Cell Hemoglobin Concentration : 34.0 gm/dL  Auto Neutrophil # : x  Auto Lymphocyte # : x  Auto Monocyte # : x  Auto Eosinophil # : x  Auto Basophil # : x  Auto Neutrophil % : x  Auto Lymphocyte % : x  Auto Monocyte % : x  Auto Eosinophil % : x  Auto Basophil % : x    05-15    143  |  109<H>  |  30<H>  ----------------------------<  81  2.8<LL>   |  24  |  1.79<H>    Ca    7.7<L>      15 May 2023 06:43  Phos  2.2     05-15  Mg     2.1     05-15    TPro  6.4  /  Alb  2.9<L>  /  TBili  3.7<H>  /  DBili  x   /  AST  406<H>  /  ALT  261<H>  /  AlkPhos  103  05-15    PT/INR - ( 14 May 2023 23:53 )   PT: 20.8 sec;   INR: 1.78 ratio         PTT - ( 14 May 2023 23:53 )  PTT:35.6 sec  Urinalysis Basic - ( 15 May 2023 04:14 )    Color: Yellow / Appearance: Clear / S.005 / pH: x  Gluc: x / Ketone: Trace  / Bili: Moderate / Urobili: 4   Blood: x / Protein: 15 / Nitrite: Negative   Leuk Esterase: Trace / RBC: 3-5 /HPF / WBC 11-25 /HPF   Sq Epi: x / Non Sq Epi: x / Bacteria: Occasional        RADIOLOGY & ADDITIONAL STUDIES:      CT Abdomen and Pelvis w/ IV Cont (05.15.23 @ 01:16)    FINDINGS:    LOWER CHEST: Atelectasis. Aortic root calcification.    LIVER: No obvious lesion.  BILE DUCTS/GALLBLADDER: No obvious intraparenchymal dilatation. Stable   common bile duct (0.7 cm). Distended gallbladder with gallbladder wall   edema.  PANCREAS: Stable duct.  SPLEEN: Unremarkable.    ADRENALS: Unremarkable.  KIDNEYS/URETERS: Bilateral perinephric stranding without   hydroureteronephrosis. Question striated bilateral nephrogram. Stable   indeterminate right renal lesion and hypodensities.  BLADDER: Partially distended.  REPRODUCTIVE ORGANS: Normal size of the prostate.    BOWEL: No bowel obstruction. Unremarkable appendix. Sigmoid colon   anastomosis. Colon diverticulosis. Wall thickening of the cecum,   ascending and transverse colon with slight stranding.  PERITONEUM: No drainable fluid collection or free air.  VESSELS: Atherosclerosis. Normal caliber of the abdominal aorta.   Hypodensities at the anterior branches of the right intrahepatic portal   vein, likely thrombus. Patent portal vein, hepatic veins, SMV and splenic   vein.  RETROPERITONEUM: Subcentimeter lymph nodes without lymphadenopathy.  ABDOMINAL WALL/SOFT TISSUES: Small fat-containing umbilical hernia.  BONES: Degenerative changes of the spine. Nonspecific small sclerotic   foci at the pelvic bones.    IMPRESSION:    Distended gallbladder with gallbladder wall edema. ? cholecystitis.   Recommend correlation and additional imaging.    Right intrahepatic portal vein thrombosis.    Partial distention versus colitis of the ascending/transverse colon.   Recommend clinical correlation.    Bilateral perinephric stranding without hydroureteronephrosis. Question   striated bilateral nephrogram. Recommend clinical correlation to assess   urinary tract infection.    Additional findings as described.

## 2023-05-15 NOTE — PATIENT PROFILE ADULT - HAVE YOU RECEIVED AT LEAST TWO PFIZER AND/OR MODERNA VACCINATIONS (IN ANY COMBINATION) AND/OR ONE JOHNSON & JOHNSON VACCINATION?
Outpatient Care Management   - Care Plan Follow Up    Patient: Elmer Silva  MRN:  [id number]  Date of Service:  3/28/2022  Completed by:  Lona Looney LCSW  Referral Date: 02/18/2022  Program:     Reason for Visit   Patient presents with    OPCM Chart Review    Update Plan Of Care       Brief Summary: OPCM  completed follow up call with pt who reports that he was able to complete his section 8 housing recertification application with the help of people in the office at his [facility] complex.  Also, states he was able to speak with a rep with [employer] and was told that a  would be calling him back.  Encouraged pt to contact [employer] office tomorrow if he did not hear back from anyone.  Pt has obtained the print out from [employer] that was needed for his [employer] application.  Will follow up in 2 weeks.     Complex Care Plan     Care plan was discussed and completed today with input from patient and/or caregiver.    Patient Instructions     Instructions were provided via the [facility] patient resources and are available for the patient to view on the patient portal.    No follow-ups on file.    Todays OPC Self-Management Care Plan was developed with the patients/caregivers input and was based on identified barriers from todays assessment.  Goals were written today with the patient/caregiver and the patient has agreed to work towards these goals to improve his/her overall well-being. Patient verbalized understanding of the care plan, goals, and all of today's instructions. Encouraged patient/caregiver to communicate with his/her physician and health care team about health conditions and the treatment plan.  Provided my contact information today and encouraged patient/caregiver to call me with any questions as needed.     Yes

## 2023-05-15 NOTE — PATIENT PROFILE ADULT - LIVING ENVIRONMENT
If you receive a survey via e-mail or mail, please take the time to complete and return as your feedback is very helpful to our practice.     If your neurological testing is not going to be scheduled today our Pre-Service Department will contact you to schedule within one to two days. If you would like to call and schedule when it is convenient for you or if you need to reschedule your appointment please call the Pre-Service Department at [phone].    Your tests will be reviewed by the Benefits Department and if there are any concerns regarding prior authorization or financial obligation they will contact you. We recommend you still contact your insurance company to see if the test, provider, and location of service are covered, and if so, will there be any out of pocket expenses.     If you should have any concerns regarding the financial obligation of the tests please contact our Financial Advocates at [phone] and they will be happy to assist you.                 Thank you for letting us care for you today.     In order to make sure we are meeting our patients' needs, we require a 36 hour notice from you prior to picking up your medications. Attached is a table that will help you determine when your prescriptions will be ready for pick-up.                      If the refill is requested between when the clinic opens and 12 pm on  You can  your prescription at the pharmacy after 6 PM on   Monday Tuesday Tuesday Wednesday Wednesday Thursday Thursday Friday Friday Monday     If the refill is requested between 12 pm and after the clinic closes on You can  your prescription at the pharmacy after 12 PM on   Monday Wednesday Tuesday Thursday Wednesday Friday Thursday Monday Friday Tuesday     The components of a brain-healthy lifestyle:     * Regular Exercise.Either walking or a stationary recumbent bike(30 minutes daily).   * Maintain a normal BMI(Body Mass Index).  * Healthy  diet, including 4-5 servings of fruits and vegetables daily.  * Only 1 serving of red meat daily.  * Mental Stimulation.  * Active treatment for any type of sleep disorder.  * Stress Management and/or treatment for anxiety/depression.   * An active social life.   * Vitamin D 5000 units daily.  * Drink coffee.  * Fresh Berries.  * Magnesium 400 mg  Daily   * Vit B-2 200 mg daily   * Migravent- over the counter   +++++++++++++++++++++++    * stop atenolol and indocin     * Will place Botox prior authorization. Will call in regards to approval or denial.        no

## 2023-05-15 NOTE — ED PROVIDER NOTE - DISPOSITION TYPE
Problem: Skin Injury Risk Increased  Goal: Skin Health and Integrity  Intervention: Promote and Optimize Oral Intake  Recent Flowsheet Documentation  Taken 4/12/2023 1400 by Asia Jensen RN  Oral Nutrition Promotion: rest periods promoted  Taken 4/12/2023 1107 by Asia Jensen RN  Oral Nutrition Promotion: rest periods promoted  Intervention: Optimize Skin Protection  Recent Flowsheet Documentation  Taken 4/12/2023 1600 by Asia Jensen RN  Pressure Reduction Techniques:   frequent weight shift encouraged   weight shift assistance provided  Pressure Reduction Devices:   heel offloading device utilized   positioning supports utilized   pressure-redistributing mattress utilized   specialty bed utilized  Skin Protection:   adhesive use limited   skin sealant/moisture barrier applied  Taken 4/12/2023 1400 by Asia Jensen RN  Pressure Reduction Techniques:   frequent weight shift encouraged   weight shift assistance provided  Pressure Reduction Devices: specialty bed utilized  Skin Protection:   adhesive use limited   skin sealant/moisture barrier applied  Taken 4/12/2023 1107 by Asia Jensen RN  Pressure Reduction Techniques:   frequent weight shift encouraged   weight shift assistance provided  Pressure Reduction Devices: specialty bed utilized  Skin Protection:   adhesive use limited   skin sealant/moisture barrier applied     Problem: Fall Injury Risk  Goal: Absence of Fall and Fall-Related Injury  Intervention: Identify and Manage Contributors  Recent Flowsheet Documentation  Taken 4/12/2023 1600 by Asia Jensen RN  Medication Review/Management: medications reviewed  Taken 4/12/2023 1400 by Asia Jensen RN  Medication Review/Management: medications reviewed  Taken 4/12/2023 1107 by Asia Jensen RN  Medication Review/Management: medications reviewed  Intervention: Promote Injury-Free Environment  Recent Flowsheet Documentation  Taken 4/12/2023 1600 by Asia Jensen RN  Safety Promotion/Fall  Prevention:   activity supervised   assistive device/personal items within reach   clutter free environment maintained   nonskid shoes/slippers when out of bed   room organization consistent   safety round/check completed  Taken 4/12/2023 1400 by Asia Jensen RN  Safety Promotion/Fall Prevention:   activity supervised   assistive device/personal items within reach   clutter free environment maintained   nonskid shoes/slippers when out of bed   safety round/check completed   toileting scheduled  Taken 4/12/2023 1107 by Asia Jensen RN  Safety Promotion/Fall Prevention:   activity supervised   assistive device/personal items within reach   clutter free environment maintained   room organization consistent   safety round/check completed   nonskid shoes/slippers when out of bed  Taken 4/12/2023 0720 by Asia Jensen RN  Safety Promotion/Fall Prevention: patient off unit     Problem: Device-Related Complication Risk (Hemodialysis)  Goal: Safe, Effective Therapy Delivery  Intervention: Optimize Device Care and Function  Recent Flowsheet Documentation  Taken 4/12/2023 1600 by Asia Jensen RN  Medication Review/Management: medications reviewed  Taken 4/12/2023 1400 by Asia Jensen RN  Medication Review/Management: medications reviewed  Taken 4/12/2023 1107 by Asia Jensen RN  Medication Review/Management: medications reviewed     Problem: Pain Acute  Goal: Acceptable Pain Control and Functional Ability  Intervention: Prevent or Manage Pain  Recent Flowsheet Documentation  Taken 4/12/2023 1600 by Asia Jensen RN  Sensory Stimulation Regulation:   care clustered   quiet environment promoted  Sleep/Rest Enhancement: awakenings minimized  Medication Review/Management: medications reviewed  Taken 4/12/2023 1400 by Asia Jensen RN  Sensory Stimulation Regulation:   care clustered   lighting decreased   quiet environment promoted  Sleep/Rest Enhancement:   awakenings minimized   noise level reduced   natural light  exposure provided   regular sleep/rest pattern promoted   relaxation techniques promoted   room darkened  Medication Review/Management: medications reviewed  Taken 4/12/2023 1107 by Asia Jensen RN  Sensory Stimulation Regulation:   care clustered   lighting decreased   quiet environment promoted  Sleep/Rest Enhancement: awakenings minimized  Medication Review/Management: medications reviewed  Intervention: Develop Pain Management Plan  Recent Flowsheet Documentation  Taken 4/12/2023 1600 by Asia Jensen RN  Pain Management Interventions:   care clustered   quiet environment facilitated   relaxation techniques promoted  Taken 4/12/2023 1400 by Asia Jensen RN  Pain Management Interventions:   care clustered   quiet environment facilitated  Taken 4/12/2023 1107 by Asia Jensen RN  Pain Management Interventions:   care clustered   quiet environment facilitated   relaxation techniques promoted  Intervention: Optimize Psychosocial Wellbeing  Recent Flowsheet Documentation  Taken 4/12/2023 1600 by Asia Jensen RN  Supportive Measures: active listening utilized  Taken 4/12/2023 1107 by Asia Jensen RN  Supportive Measures:   active listening utilized   relaxation techniques promoted     Problem: Impaired Wound Healing  Goal: Optimal Wound Healing  Intervention: Promote Wound Healing  Recent Flowsheet Documentation  Taken 4/12/2023 1600 by Asia Jensen RN  Pain Management Interventions:   care clustered   quiet environment facilitated   relaxation techniques promoted  Sleep/Rest Enhancement: awakenings minimized  Taken 4/12/2023 1400 by Asia Jensen RN  Activity Management: activity encouraged  Oral Nutrition Promotion: rest periods promoted  Pain Management Interventions:   care clustered   quiet environment facilitated  Sleep/Rest Enhancement:   awakenings minimized   noise level reduced   natural light exposure provided   regular sleep/rest pattern promoted   relaxation techniques promoted   room  darkened  Taken 4/12/2023 1107 by Asia Jensen RN  Activity Management: activity encouraged  Oral Nutrition Promotion: rest periods promoted  Pain Management Interventions:   care clustered   quiet environment facilitated   relaxation techniques promoted  Sleep/Rest Enhancement: awakenings minimized     Problem: Activity Intolerance  Goal: Enhanced Capacity and Energy  Intervention: Optimize Activity Tolerance  Recent Flowsheet Documentation  Taken 4/12/2023 1600 by Asia Jensen RN  Environmental Support: calm environment promoted  Taken 4/12/2023 1400 by Asia Jensen RN  Activity Management: activity encouraged  Taken 4/12/2023 1107 by Asia Jensen RN  Activity Management: activity encouraged  Environmental Support: calm environment promoted     Problem: Fluid and Electrolyte Imbalance (Acute Kidney Injury/Impairment)  Goal: Fluid and Electrolyte Balance  Intervention: Monitor and Manage Fluid and Electrolyte Balance  Recent Flowsheet Documentation  Taken 4/12/2023 1600 by Asia Jensen RN  Fluid/Electrolyte Management: fluids provided  Taken 4/12/2023 1400 by Asia Jensen RN  Fluid/Electrolyte Management: fluids provided  Taken 4/12/2023 1107 by Asia Jensen RN  Fluid/Electrolyte Management: fluids provided     Problem: Oral Intake Inadequate (Acute Kidney Injury/Impairment)  Goal: Optimal Nutrition Intake  Intervention: Promote and Optimize Nutrition  Recent Flowsheet Documentation  Taken 4/12/2023 1400 by Asia Jensen RN  Oral Nutrition Promotion: rest periods promoted  Taken 4/12/2023 1107 by Asia Jensen RN  Oral Nutrition Promotion: rest periods promoted     Problem: Renal Function Impairment (Acute Kidney Injury/Impairment)  Goal: Effective Renal Function  Intervention: Monitor and Support Renal Function  Recent Flowsheet Documentation  Taken 4/12/2023 1600 by Asia Jensen RN  Medication Review/Management: medications reviewed  Taken 4/12/2023 1400 by Asia Jensen RN  Medication  Review/Management: medications reviewed  Taken 4/12/2023 1107 by Asai Jensen RN  Medication Review/Management: medications reviewed     Problem: Fluid Volume Excess  Goal: Fluid Balance  Intervention: Monitor and Manage Hypervolemia  Recent Flowsheet Documentation  Taken 4/12/2023 1600 by Asia Jensen RN  Fluid/Electrolyte Management: fluids provided  Skin Protection:   adhesive use limited   skin sealant/moisture barrier applied  Taken 4/12/2023 1400 by Asia Jensen RN  Fluid/Electrolyte Management: fluids provided  Skin Protection:   adhesive use limited   skin sealant/moisture barrier applied  Taken 4/12/2023 1107 by Asia Jensen RN  Fluid/Electrolyte Management: fluids provided  Skin Protection:   adhesive use limited   skin sealant/moisture barrier applied     Problem: Adult Inpatient Plan of Care  Goal: Absence of Hospital-Acquired Illness or Injury  Intervention: Identify and Manage Fall Risk  Recent Flowsheet Documentation  Taken 4/12/2023 1600 by Asia Jensen RN  Safety Promotion/Fall Prevention:   activity supervised   assistive device/personal items within reach   clutter free environment maintained   nonskid shoes/slippers when out of bed   room organization consistent   safety round/check completed  Taken 4/12/2023 1400 by Asia Jensen RN  Safety Promotion/Fall Prevention:   activity supervised   assistive device/personal items within reach   clutter free environment maintained   nonskid shoes/slippers when out of bed   safety round/check completed   toileting scheduled  Taken 4/12/2023 1107 by Asia Jensen RN  Safety Promotion/Fall Prevention:   activity supervised   assistive device/personal items within reach   clutter free environment maintained   room organization consistent   safety round/check completed   nonskid shoes/slippers when out of bed  Taken 4/12/2023 0720 by Asia Jensen RN  Safety Promotion/Fall Prevention: patient off unit  Intervention: Prevent Skin Injury  Recent  Flowsheet Documentation  Taken 4/12/2023 1600 by Asia Jensen RN  Skin Protection:   adhesive use limited   skin sealant/moisture barrier applied  Taken 4/12/2023 1400 by Asia Jensen RN  Body Position: (up on chair) other (see comments)  Skin Protection:   adhesive use limited   skin sealant/moisture barrier applied  Taken 4/12/2023 1107 by Asia Jensen RN  Body Position: turned  Skin Protection:   adhesive use limited   skin sealant/moisture barrier applied  Intervention: Prevent and Manage VTE (Venous Thromboembolism) Risk  Recent Flowsheet Documentation  Taken 4/12/2023 1400 by Asia Jensen RN  Activity Management: activity encouraged  Taken 4/12/2023 1107 by Asia Jensen RN  Activity Management: activity encouraged  VTE Prevention/Management: (eliquis)   patient refused intervention   other (see comments)  Range of Motion: ROM (range of motion) performed  Intervention: Prevent Infection  Recent Flowsheet Documentation  Taken 4/12/2023 1600 by Asia Jensen RN  Infection Prevention:   environmental surveillance performed   hand hygiene promoted   single patient room provided  Taken 4/12/2023 1400 by Asia Jensen RN  Infection Prevention:   environmental surveillance performed   hand hygiene promoted   single patient room provided  Taken 4/12/2023 1107 by Asia Jensen RN  Infection Prevention:   environmental surveillance performed   hand hygiene promoted   rest/sleep promoted   single patient room provided  Goal: Optimal Comfort and Wellbeing  Intervention: Monitor Pain and Promote Comfort  Recent Flowsheet Documentation  Taken 4/12/2023 1600 by Asia Jensen RN  Pain Management Interventions:   care clustered   quiet environment facilitated   relaxation techniques promoted  Taken 4/12/2023 1400 by Asia Jensen RN  Pain Management Interventions:   care clustered   quiet environment facilitated  Taken 4/12/2023 1107 by Asia Jensen RN  Pain Management Interventions:   care clustered   quiet  environment facilitated   relaxation techniques promoted  Intervention: Provide Person-Centered Care  Recent Flowsheet Documentation  Taken 4/12/2023 1600 by Asia Jensen RN  Trust Relationship/Rapport:   care explained   choices provided   reassurance provided  Taken 4/12/2023 1400 by sAia Jensen RN  Trust Relationship/Rapport:   care explained   questions answered   reassurance provided  Taken 4/12/2023 1107 by Asia Jensen RN  Trust Relationship/Rapport:   care explained   questions answered   reassurance provided     Problem: Palliative Care  Goal: Enhanced Quality of Life  Intervention: Maximize Comfort  Recent Flowsheet Documentation  Taken 4/12/2023 1600 by Asia Jensen RN  Pain Management Interventions:   care clustered   quiet environment facilitated   relaxation techniques promoted  Taken 4/12/2023 1400 by Asia Jensen RN  Pain Management Interventions:   care clustered   quiet environment facilitated  Taken 4/12/2023 1107 by Asia Jensen RN  Pain Management Interventions:   care clustered   quiet environment facilitated   relaxation techniques promoted  Intervention: Optimize Function  Recent Flowsheet Documentation  Taken 4/12/2023 1600 by Asia Jensen RN  Sensory Stimulation Regulation:   care clustered   quiet environment promoted  Fatigue Management: activity schedule adjusted  Sleep/Rest Enhancement: awakenings minimized  Taken 4/12/2023 1400 by Asia Jensen RN  Sensory Stimulation Regulation:   care clustered   lighting decreased   quiet environment promoted  Fatigue Management: activity schedule adjusted  Sleep/Rest Enhancement:   awakenings minimized   noise level reduced   natural light exposure provided   regular sleep/rest pattern promoted   relaxation techniques promoted   room darkened  Taken 4/12/2023 1107 by Asia Jensen RN  Sensory Stimulation Regulation:   care clustered   lighting decreased   quiet environment promoted  Fatigue Management: activity schedule  adjusted  Sleep/Rest Enhancement: awakenings minimized  Intervention: Optimize Psychosocial Wellbeing  Recent Flowsheet Documentation  Taken 4/12/2023 1600 by Asia Jensen RN  Supportive Measures: active listening utilized  Taken 4/12/2023 1107 by Asia Jensen RN  Supportive Measures:   active listening utilized   relaxation techniques promoted     Problem: Skin Injury Risk Increased  Goal: Skin Health and Integrity  Intervention: Promote and Optimize Oral Intake  Recent Flowsheet Documentation  Taken 4/12/2023 1400 by Asia Jensen RN  Oral Nutrition Promotion: rest periods promoted  Taken 4/12/2023 1107 by Asia Jensen RN  Oral Nutrition Promotion: rest periods promoted  Intervention: Optimize Skin Protection  Recent Flowsheet Documentation  Taken 4/12/2023 1600 by Asia Jensen RN  Pressure Reduction Techniques:   frequent weight shift encouraged   weight shift assistance provided  Pressure Reduction Devices:   heel offloading device utilized   positioning supports utilized   pressure-redistributing mattress utilized   specialty bed utilized  Skin Protection:   adhesive use limited   skin sealant/moisture barrier applied  Taken 4/12/2023 1400 by Asia Jensen RN  Pressure Reduction Techniques:   frequent weight shift encouraged   weight shift assistance provided  Pressure Reduction Devices: specialty bed utilized  Skin Protection:   adhesive use limited   skin sealant/moisture barrier applied  Taken 4/12/2023 1107 by Asia Jensen RN  Pressure Reduction Techniques:   frequent weight shift encouraged   weight shift assistance provided  Pressure Reduction Devices: specialty bed utilized  Skin Protection:   adhesive use limited   skin sealant/moisture barrier applied     Problem: Fall Injury Risk  Goal: Absence of Fall and Fall-Related Injury  Intervention: Identify and Manage Contributors  Recent Flowsheet Documentation  Taken 4/12/2023 1600 by Asia Jensen RN  Medication Review/Management: medications  reviewed  Taken 4/12/2023 1400 by Asia Jensen RN  Medication Review/Management: medications reviewed  Taken 4/12/2023 1107 by Asia Jensen RN  Medication Review/Management: medications reviewed  Intervention: Promote Injury-Free Environment  Recent Flowsheet Documentation  Taken 4/12/2023 1600 by Asia Jensen RN  Safety Promotion/Fall Prevention:   activity supervised   assistive device/personal items within reach   clutter free environment maintained   nonskid shoes/slippers when out of bed   room organization consistent   safety round/check completed  Taken 4/12/2023 1400 by Asia Jensen RN  Safety Promotion/Fall Prevention:   activity supervised   assistive device/personal items within reach   clutter free environment maintained   nonskid shoes/slippers when out of bed   safety round/check completed   toileting scheduled  Taken 4/12/2023 1107 by Asia Jensen RN  Safety Promotion/Fall Prevention:   activity supervised   assistive device/personal items within reach   clutter free environment maintained   room organization consistent   safety round/check completed   nonskid shoes/slippers when out of bed  Taken 4/12/2023 0720 by Asia Jensen RN  Safety Promotion/Fall Prevention: patient off unit     Problem: Adult Inpatient Plan of Care  Goal: Absence of Hospital-Acquired Illness or Injury  Intervention: Identify and Manage Fall Risk  Recent Flowsheet Documentation  Taken 4/12/2023 1600 by Asia Jensen RN  Safety Promotion/Fall Prevention:   activity supervised   assistive device/personal items within reach   clutter free environment maintained   nonskid shoes/slippers when out of bed   room organization consistent   safety round/check completed  Taken 4/12/2023 1400 by Asia Jensen RN  Safety Promotion/Fall Prevention:   activity supervised   assistive device/personal items within reach   clutter free environment maintained   nonskid shoes/slippers when out of bed   safety round/check completed    toileting scheduled  Taken 4/12/2023 1107 by Asia Jensen RN  Safety Promotion/Fall Prevention:   activity supervised   assistive device/personal items within reach   clutter free environment maintained   room organization consistent   safety round/check completed   nonskid shoes/slippers when out of bed  Taken 4/12/2023 0720 by Asia Jensen RN  Safety Promotion/Fall Prevention: patient off unit  Intervention: Prevent Skin Injury  Recent Flowsheet Documentation  Taken 4/12/2023 1600 by Asia Jensen RN  Skin Protection:   adhesive use limited   skin sealant/moisture barrier applied  Taken 4/12/2023 1400 by Asia Jensen RN  Body Position: (up on chair) other (see comments)  Skin Protection:   adhesive use limited   skin sealant/moisture barrier applied  Taken 4/12/2023 1107 by Asia Jensen RN  Body Position: turned  Skin Protection:   adhesive use limited   skin sealant/moisture barrier applied  Intervention: Prevent and Manage VTE (Venous Thromboembolism) Risk  Recent Flowsheet Documentation  Taken 4/12/2023 1400 by Asia Jensen RN  Activity Management: activity encouraged  Taken 4/12/2023 1107 by Asia Jensen RN  Activity Management: activity encouraged  VTE Prevention/Management: (eliquis)   patient refused intervention   other (see comments)  Range of Motion: ROM (range of motion) performed  Intervention: Prevent Infection  Recent Flowsheet Documentation  Taken 4/12/2023 1600 by Asia Jensen RN  Infection Prevention:   environmental surveillance performed   hand hygiene promoted   single patient room provided  Taken 4/12/2023 1400 by Asia Jensen RN  Infection Prevention:   environmental surveillance performed   hand hygiene promoted   single patient room provided  Taken 4/12/2023 1107 by Asia Jensen RN  Infection Prevention:   environmental surveillance performed   hand hygiene promoted   rest/sleep promoted   single patient room provided  Goal: Optimal Comfort and Wellbeing  Intervention:  Monitor Pain and Promote Comfort  Recent Flowsheet Documentation  Taken 4/12/2023 1600 by Asia Jensen RN  Pain Management Interventions:   care clustered   quiet environment facilitated   relaxation techniques promoted  Taken 4/12/2023 1400 by Asia Jensen RN  Pain Management Interventions:   care clustered   quiet environment facilitated  Taken 4/12/2023 1107 by Asia Jensen RN  Pain Management Interventions:   care clustered   quiet environment facilitated   relaxation techniques promoted  Intervention: Provide Person-Centered Care  Recent Flowsheet Documentation  Taken 4/12/2023 1600 by Asia Jensen RN  Trust Relationship/Rapport:   care explained   choices provided   reassurance provided  Taken 4/12/2023 1400 by Asia Jensen RN  Trust Relationship/Rapport:   care explained   questions answered   reassurance provided  Taken 4/12/2023 1107 by Asia Jensen RN  Trust Relationship/Rapport:   care explained   questions answered   reassurance provided     Problem: Device-Related Complication Risk (Hemodialysis)  Goal: Safe, Effective Therapy Delivery  Intervention: Optimize Device Care and Function  Recent Flowsheet Documentation  Taken 4/12/2023 1600 by Asia Jensen RN  Medication Review/Management: medications reviewed  Taken 4/12/2023 1400 by Asia Jensen RN  Medication Review/Management: medications reviewed  Taken 4/12/2023 1107 by Asia Jensen RN  Medication Review/Management: medications reviewed   Goal Outcome Evaluation:                  ADMIT

## 2023-05-15 NOTE — CONSULT NOTE ADULT - SUBJECTIVE AND OBJECTIVE BOX
73 y/o pmhx HTN, gastritis, multiple episodes of pancolitis, psych history presented to ED with x 2 days of abdominal pain. Associated vomiting, fever, chills. Hypotensive on admission to 70's systolic. Lab work significant for WBC 15.64 with L shift,, 19 % bandemia, Scr 2.55 ( baseline Scr 1.23 10/22), lactate  4.4, total bilirubin 3.2, alk phos 122, , . CT abd/pelvis significant for potential cholecystitis with gall bladder wall thickening, as well as R intrahepatic portal thrombosis.  Was given 3 L of IV fluids in ED without improvement in BP.     Pt seen and examined at bedside in the ED. Pt had no complaints at this time. Denied fever, chills, nausea, vomiting, diarrhea, constipation or SOB.     Vital Signs Last 24 Hrs  T(C): 36.1 (15 May 2023 05:43), Max: 39.4 (14 May 2023 23:44)  T(F): 97 (15 May 2023 05:43), Max: 103 (14 May 2023 23:44)  HR: 92 (15 May 2023 05:00) (78 - 122)  BP: 92/66 (15 May 2023 05:00) (69/50 - 132/89)  BP(mean): 72 (15 May 2023 05:00) (72 - 99)  RR: 22 (15 May 2023 05:00) (18 - 27)  SpO2: 96% (15 May 2023 05:00) (93% - 99%)    Parameters below as of 15 May 2023 04:45  Patient On (Oxygen Delivery Method): room air                            11.8   15.64 )-----------( 146      ( 14 May 2023 23:53 )             35.0     05-14    142  |  102  |  32<H>  ----------------------------<  101<H>  3.1<L>   |  21<L>  |  2.55<H>    Ca    8.4<L>      14 May 2023 23:53  Mg     1.9     05-14    TPro  6.7  /  Alb  3.0<L>  /  TBili  3.2<H>  /  DBili  x   /  AST  353<H>  /  ALT  243<H>  /  AlkPhos  122<H>  05-14    I&O's Summary    14 May 2023 07:01  -  15 May 2023 06:11  --------------------------------------------------------  IN: 0 mL / OUT: 1375 mL / NET: -1375 mL          Physical Exam:  Pt is AAOx3  General: Well developed, in no acute distress.   Chest: Lungs clear, no rales, no rhonchi, no wheezes.   Heart: RR, no murmurs, no rubs, no gallops.   Abdomen: Soft, mild diffuse tenderness, mildly distended, no masses, BS normal.    Back: Normal curvature, no tenderness.   Neuro: Physiological, no localizing findings.   Skin: Normal, no rashes, no lesions noted.   Extremities: Warm, well perfused, no edema, Pulses intact    ACC: [id number] EXAM: CT ABDOMEN AND PELVIS IC ORDERED BY: ROSS BRODY    PROCEDURE DATE: 05/15/2023        INTERPRETATION: CLINICAL INDICATION: abd pain fever    PROCEDURE:  Helical axial images were obtained from the domes of the diaphragm through the pubic symphysis following the administration of intravenous contrast. Coronal and sagittal reformats were also obtained.    CONTRAST/COMPLICATIONS:  IV Contrast: Omnipaque 350 90 cc administered 10 cc discarded  Oral Contrast: NONE  Complications: None reported at time of study completion    COMPARISON: 10/17/2022.    FINDINGS:    LOWER CHEST: Atelectasis. Aortic root calcification.    LIVER: No obvious lesion.  BILE DUCTS/GALLBLADDER: No obvious intraparenchymal dilatation. Stable common bile duct (0.7 cm). Distended gallbladder with gallbladder wall edema.  PANCREAS: Stable duct.  SPLEEN: Unremarkable.    ADRENALS: Unremarkable.  KIDNEYS/URETERS: Bilateral perinephric stranding without hydroureteronephrosis. Question striated bilateral nephrogram. Stable indeterminate right renal lesion and hypodensities.  BLADDER: Partially distended.  REPRODUCTIVE ORGANS: Normal size of the prostate.    BOWEL: No bowel obstruction. Unremarkable appendix. Sigmoid colon anastomosis. Colon diverticulosis. Wall thickening of the cecum, ascending and transverse colon with slight stranding.  PERITONEUM: No drainable fluid collection or free air.  VESSELS: Atherosclerosis. Normal caliber of the abdominal aorta. Hypodensities at the anterior branches of the right intrahepatic portal vein, likely thrombus. Patent portal vein, hepatic veins, SMV and splenic vein.  RETROPERITONEUM: Subcentimeter lymph nodes without lymphadenopathy.  ABDOMINAL WALL/SOFT TISSUES: Small fat-containing umbilical hernia.  BONES: Degenerative changes of the spine. Nonspecific small sclerotic foci at the pelvic bones.    IMPRESSION:    Distended gallbladder with gallbladder wall edema. ? cholecystitis. Recommend correlation and additional imaging.    Right intrahepatic portal vein thrombosis.    Partial distention versus colitis of the ascending/transverse colon. Recommend clinical correlation.    Bilateral perinephric stranding without hydroureteronephrosis. Question striated bilateral nephrogram. Recommend clinical correlation to assess urinary tract infection.    Additional findings as described.

## 2023-05-15 NOTE — CONSULT NOTE ADULT - ASSESSMENT
72M with abdominal pain found to have elevated LFTS, hypotension, and imaging suggestive of cholecystitis    recommend patient be admitted for medical mgmt of cholangitis  IVF resuscitation  GI consult  MRCP  trend LFTs  serial abdominal exams  once medically optimized patient should undergo a cholecystectomy vs husam tube    Plan discussed with Dr. Lindsey

## 2023-05-15 NOTE — PATIENT PROFILE ADULT - FUNCTIONAL ASSESSMENT - BASIC MOBILITY 2.
Conscious sedation is planned for this procedure.    Provider immediate assessment completed.  4 = No assist / stand by assistance

## 2023-05-15 NOTE — PROGRESS NOTE ADULT - ASSESSMENT
73 yo male with HTN, gastritis, psychiatric illness on zyprexa, presented with abdominal pain and found to have ascending cholangitis, distended gallbladder, transaminitis.    Plan:    Cholangitis, septic shock  - empiric zosyn pending culture results  - fluid resuscitated, now weaning down pressor  - awaiting MRCP results - will likely need ERCP is biliary obstruction is present - if no obstruction then will need perc cholecystostomy tube  discussed with surgery and GI    portal vein thrombosis  - heparin drip  - hematology following     ALYSSA  - resolving    Hypokalemia  - repleted this AM, repeat labs pending     Restarted on home meds

## 2023-05-16 LAB
ALBUMIN SERPL ELPH-MCNC: 2.5 G/DL — LOW (ref 3.3–5)
ALP SERPL-CCNC: 120 U/L — SIGNIFICANT CHANGE UP (ref 40–120)
ALT FLD-CCNC: 166 U/L — HIGH (ref 12–78)
ANION GAP SERPL CALC-SCNC: 8 MMOL/L — SIGNIFICANT CHANGE UP (ref 5–17)
APTT BLD: 58.6 SEC — HIGH (ref 27.5–35.5)
AST SERPL-CCNC: 184 U/L — HIGH (ref 15–37)
BILIRUB SERPL-MCNC: 3 MG/DL — HIGH (ref 0.2–1.2)
BUN SERPL-MCNC: 30 MG/DL — HIGH (ref 7–23)
CALCIUM SERPL-MCNC: 7.9 MG/DL — LOW (ref 8.5–10.1)
CHLORIDE SERPL-SCNC: 114 MMOL/L — HIGH (ref 96–108)
CO2 SERPL-SCNC: 20 MMOL/L — LOW (ref 22–31)
CREAT SERPL-MCNC: 1.31 MG/DL — HIGH (ref 0.5–1.3)
CULTURE RESULTS: NO GROWTH — SIGNIFICANT CHANGE UP
EGFR: 57 ML/MIN/1.73M2 — LOW
GLUCOSE BLDC GLUCOMTR-MCNC: 135 MG/DL — HIGH (ref 70–99)
GLUCOSE SERPL-MCNC: 125 MG/DL — HIGH (ref 70–99)
GRAM STN FLD: SIGNIFICANT CHANGE UP
GRAM STN FLD: SIGNIFICANT CHANGE UP
HCT VFR BLD CALC: 31.3 % — LOW (ref 39–50)
HCT VFR BLD CALC: 31.9 % — LOW (ref 39–50)
HGB BLD-MCNC: 10.7 G/DL — LOW (ref 13–17)
HGB BLD-MCNC: 10.7 G/DL — LOW (ref 13–17)
MAGNESIUM SERPL-MCNC: 2.2 MG/DL — SIGNIFICANT CHANGE UP (ref 1.6–2.6)
MCHC RBC-ENTMCNC: 28.6 PG — SIGNIFICANT CHANGE UP (ref 27–34)
MCHC RBC-ENTMCNC: 28.8 PG — SIGNIFICANT CHANGE UP (ref 27–34)
MCHC RBC-ENTMCNC: 33.5 GM/DL — SIGNIFICANT CHANGE UP (ref 32–36)
MCHC RBC-ENTMCNC: 34.2 GM/DL — SIGNIFICANT CHANGE UP (ref 32–36)
MCV RBC AUTO: 84.4 FL — SIGNIFICANT CHANGE UP (ref 80–100)
MCV RBC AUTO: 85.3 FL — SIGNIFICANT CHANGE UP (ref 80–100)
PHOSPHATE SERPL-MCNC: 3.1 MG/DL — SIGNIFICANT CHANGE UP (ref 2.5–4.5)
PLATELET # BLD AUTO: 129 K/UL — LOW (ref 150–400)
PLATELET # BLD AUTO: 145 K/UL — LOW (ref 150–400)
POTASSIUM SERPL-MCNC: 3.2 MMOL/L — LOW (ref 3.5–5.3)
POTASSIUM SERPL-SCNC: 3.2 MMOL/L — LOW (ref 3.5–5.3)
PROT SERPL-MCNC: 5.8 GM/DL — LOW (ref 6–8.3)
RBC # BLD: 3.71 M/UL — LOW (ref 4.2–5.8)
RBC # BLD: 3.74 M/UL — LOW (ref 4.2–5.8)
RBC # FLD: 14.9 % — HIGH (ref 10.3–14.5)
RBC # FLD: 15.1 % — HIGH (ref 10.3–14.5)
SODIUM SERPL-SCNC: 142 MMOL/L — SIGNIFICANT CHANGE UP (ref 135–145)
SPECIMEN SOURCE: SIGNIFICANT CHANGE UP
WBC # BLD: 11.65 K/UL — HIGH (ref 3.8–10.5)
WBC # BLD: 13.6 K/UL — HIGH (ref 3.8–10.5)
WBC # FLD AUTO: 11.65 K/UL — HIGH (ref 3.8–10.5)
WBC # FLD AUTO: 13.6 K/UL — HIGH (ref 3.8–10.5)

## 2023-05-16 PROCEDURE — 76705 ECHO EXAM OF ABDOMEN: CPT | Mod: 26

## 2023-05-16 PROCEDURE — 99233 SBSQ HOSP IP/OBS HIGH 50: CPT

## 2023-05-16 RX ORDER — HEPARIN SODIUM 5000 [USP'U]/ML
INJECTION INTRAVENOUS; SUBCUTANEOUS
Qty: 25000 | Refills: 0 | Status: DISCONTINUED | OUTPATIENT
Start: 2023-05-16 | End: 2023-05-23

## 2023-05-16 RX ORDER — HEPARIN SODIUM 5000 [USP'U]/ML
5000 INJECTION INTRAVENOUS; SUBCUTANEOUS EVERY 6 HOURS
Refills: 0 | Status: DISCONTINUED | OUTPATIENT
Start: 2023-05-16 | End: 2023-05-16

## 2023-05-16 RX ORDER — HEPARIN SODIUM 5000 [USP'U]/ML
5000 INJECTION INTRAVENOUS; SUBCUTANEOUS ONCE
Refills: 0 | Status: DISCONTINUED | OUTPATIENT
Start: 2023-05-16 | End: 2023-05-16

## 2023-05-16 RX ORDER — POTASSIUM CHLORIDE 20 MEQ
10 PACKET (EA) ORAL ONCE
Refills: 0 | Status: COMPLETED | OUTPATIENT
Start: 2023-05-16 | End: 2023-05-16

## 2023-05-16 RX ORDER — POTASSIUM CHLORIDE 20 MEQ
40 PACKET (EA) ORAL ONCE
Refills: 0 | Status: COMPLETED | OUTPATIENT
Start: 2023-05-16 | End: 2023-05-16

## 2023-05-16 RX ORDER — HEPARIN SODIUM 5000 [USP'U]/ML
2500 INJECTION INTRAVENOUS; SUBCUTANEOUS EVERY 6 HOURS
Refills: 0 | Status: DISCONTINUED | OUTPATIENT
Start: 2023-05-16 | End: 2023-05-16

## 2023-05-16 RX ADMIN — OLANZAPINE 5 MILLIGRAM(S): 15 TABLET, FILM COATED ORAL at 11:04

## 2023-05-16 RX ADMIN — Medication 2 PACKET(S): at 11:22

## 2023-05-16 RX ADMIN — PIPERACILLIN AND TAZOBACTAM 25 GRAM(S): 4; .5 INJECTION, POWDER, LYOPHILIZED, FOR SOLUTION INTRAVENOUS at 06:30

## 2023-05-16 RX ADMIN — CHLORHEXIDINE GLUCONATE 1 APPLICATION(S): 213 SOLUTION TOPICAL at 06:30

## 2023-05-16 RX ADMIN — ATORVASTATIN CALCIUM 10 MILLIGRAM(S): 80 TABLET, FILM COATED ORAL at 21:29

## 2023-05-16 RX ADMIN — PIPERACILLIN AND TAZOBACTAM 25 GRAM(S): 4; .5 INJECTION, POWDER, LYOPHILIZED, FOR SOLUTION INTRAVENOUS at 14:50

## 2023-05-16 RX ADMIN — HEPARIN SODIUM 1100 UNIT(S)/HR: 5000 INJECTION INTRAVENOUS; SUBCUTANEOUS at 17:11

## 2023-05-16 RX ADMIN — HEPARIN SODIUM 1100 UNIT(S)/HR: 5000 INJECTION INTRAVENOUS; SUBCUTANEOUS at 19:53

## 2023-05-16 RX ADMIN — Medication 40 MILLIEQUIVALENT(S): at 11:04

## 2023-05-16 RX ADMIN — SODIUM CHLORIDE 50 MILLILITER(S): 9 INJECTION, SOLUTION INTRAVENOUS at 11:22

## 2023-05-16 RX ADMIN — PANTOPRAZOLE SODIUM 40 MILLIGRAM(S): 20 TABLET, DELAYED RELEASE ORAL at 11:04

## 2023-05-16 RX ADMIN — Medication 100 MILLIEQUIVALENT(S): at 11:04

## 2023-05-16 RX ADMIN — Medication 6.17 MICROGRAM(S)/KG/MIN: at 11:30

## 2023-05-16 RX ADMIN — HEPARIN SODIUM 1100 UNIT(S)/HR: 5000 INJECTION INTRAVENOUS; SUBCUTANEOUS at 23:51

## 2023-05-16 RX ADMIN — PIPERACILLIN AND TAZOBACTAM 25 GRAM(S): 4; .5 INJECTION, POWDER, LYOPHILIZED, FOR SOLUTION INTRAVENOUS at 21:29

## 2023-05-16 NOTE — PROGRESS NOTE ADULT - ASSESSMENT
Patient presented in septic shock secondary to ascending cholangitis.     ERCP was performed, stone was removed, stent was placed and sphincterotomy was performed. Plan is to place a perc husam drain today.     Shock state persists and patient remains requiring norepinephrine drip which is being titrated to MAPs > 65    Zosyn is being given 3.375 q6h renally dosed for creatine cleared of 40     D5LR running at 50/hr for hypoglycemia and hypovolemia - keeping fluid rate mild due to 3+ TR and mod-severe pulmonary hypertension     Acute kidney injury is improving; BUN and Crt are being trended, IVF for hydration, patient making adequate dark concentrated urine     Hypokalemia and hypophosphatemia treated with 30mm KPO4 IVPB     Finger sticks q6h while NPO     There was a portal vein thrombosis noted on CTA previously in which heparin drip was initiated. Abdominal ultrasound with portal vein visualization as well as MRCP are NOT showing thrombosis and flow appears normal. Patient may not require AC pending further conversations.     UA positive; Urine Cx pending     Blood cultures growing gram negative rods; awaiting further speciation     Lactate cleared     I have spent a total of 100 mins of nonconsecutive critical care time managing this patient with ascending cholangitis, gram negative bacteremia, septic shock, UTI, .  This included review of relevant history, clinical examination, review of data and images, discussion of treatment with the multidisciplinary team and any consultants involved in this patient’s care as well as family discussion.     I affirm that this patient is critically ill and at high risk for sudden, fatal deterioration due to one or more of the above stated active issues. I managed/supervised life or organ support interventions that required frequent assessment. This time does not include bedside procedures that are documented separately.

## 2023-05-16 NOTE — CHART NOTE - NSCHARTNOTEFT_GEN_A_CORE
IR consulted for cholecystostomy tube for acute cholecystitis.     Imaging and chart reviewed. Patient seen at bedside.    He is currently admitted to the hospital for sepsis.  Discussed hospital course with him in [place].  He understands what has occurred thus far and reports feeling better after ERCP yesterday. No further abdominal pain.     On physical exam, no right upper quadrant tenderness. Negative sonographic Ji's sign.     CT demonstrates dilated biliary ducts with small stone on MRCP which was removed during ERCP/sphincterotomy.   WBC count 21->13. He is on minimal pressor support with BP >120, MAP>80.     Favor ascending cholangitis over acute cholecystitis. He has resolving leukocytosis, and improved LFTs. No right upper quadrant pain.   He would not meet criteria for cholecystostomy tube placement at this time.   Can continue to monitor clinically. Should he worsen, will reassess for cholecystostomy tube placement.      Right portal vein thrombosis on CT was not replicated on hepatic duplex.   Anticoagulation per primary team. IR consulted for cholecystostomy tube for acute cholecystitis.     Imaging and chart reviewed. Patient seen at bedside.    He is currently admitted to the hospital for sepsis.  Discussed hospital course with him in [place].  He understands what has occurred thus far and reports feeling better after ERCP yesterday. No further abdominal pain.     On physical exam, no right upper quadrant tenderness. Negative sonographic Ji's sign.     CT demonstrates dilated biliary ducts with small stone on MRCP which was removed during ERCP/sphincterotomy.   WBC count 21->13. He is on minimal pressor support with BP >120, MAP>80.     Favor ascending cholangitis over acute cholecystitis. He has resolving leukocytosis, and improved LFTs. No right upper quadrant pain.   He would not meet criteria for cholecystostomy tube placement at this time.   Can continue to monitor clinically. Should he worsen, will reassess for cholecystostomy tube placement.      Thrombosis of segment 8 branch for anterior division of right portal vein on CT was not replicated on hepatic duplex. Remainder of right portal vein is patent. This was not replicated on hepatic duplex.   Repeat CT abd/pelvis can be performed later this week or next week to evaluate for residual thrombosis and guide anticoagulation.   Anticoagulation per primary team. IR consulted for cholecystostomy tube for acute cholecystitis.     Imaging and chart reviewed. Patient seen at bedside.    He is currently admitted to the hospital for sepsis.  Discussed hospital course with him in [place].  He understands what has occurred thus far and reports feeling better after ERCP yesterday. No further abdominal pain.     On physical exam, no right upper quadrant tenderness. Negative sonographic Ji's sign.     CT demonstrates dilated biliary ducts with small stone on MRCP which was removed during ERCP/sphincterotomy.   WBC count 21->13. He is on minimal pressor support with BP >120, MAP>80.     Favor ascending cholangitis over acute cholecystitis. He has resolving leukocytosis, and improved LFTs. No right upper quadrant pain.   He would not meet criteria for cholecystostomy tube placement at this time.   Can continue to monitor clinically. Should he worsen, will reassess for cholecystostomy tube placement.      Thrombosis of branches of anterior division of right portal vein on CT was not replicated on hepatic duplex. Remainder of right portal vein is patent. This was not replicated on hepatic duplex.   Repeat CT abd/pelvis can be performed later this week or next week to evaluate for residual thrombosis and guide anticoagulation.   Anticoagulation per primary team.

## 2023-05-16 NOTE — PROGRESS NOTE ADULT - ASSESSMENT
71 yo male with HTN, gastritis, psychiatric illness on Zyprexa, presented with abdominal pain and found to have ascending cholangitis, biliary obstruction due to stone.    Plan:    Cholangitis, gram negative bacteremia, septic shock  - empiric zosyn pending culture results  - weaning down pressor  - s/p ERCP  - spoke with IR and surgery, no immediate plan for per husam    portal vein thrombosis - not seen on follow up US or MRCP  - heparin drip for now  - hematology following   - may need to repeat CT with contrast after ALYSSA resolves    ALYSSA  - gentle hydration     Sedation/Analgesia: none  Vasoactive medications: norepi  DVT prophylaxis: heparin  GI prophylaxis: protonix  Nutrition: diet ordered  Central line: none  [place]: none  Mobility: oob

## 2023-05-16 NOTE — PROGRESS NOTE ADULT - ATTENDING COMMENTS
Patient lying in bed without any complaints, requiring levophed, LFTS/bilirubin & WBCs trending down. PV thrombosis not appreciated on MRCP. Stabilize from cholangitis standpoint and will plan for interval cholecystectomy outpatient basis if medically optimized and cleared.

## 2023-05-16 NOTE — PROGRESS NOTE ADULT - SUBJECTIVE AND OBJECTIVE BOX
Pt seen and examined at bedside in the ED. Pt had no complaints at this time. Tolerating clear liquids after ERCP. NPO since midnight for possible cholecystostomy tube insertion today. Denied fever, chills, nausea, vomiting, diarrhea, constipation or SOB.       Vitals:  T(C): 37.6 (15 @ 22:00), Max: 37.9 (15 @ 19:45)  HR: 66 ( @ 05:00) (60 - 123)  BP: 113/60 ( @ 05:00) (86/52 - 130/65)  RR: 21 ( @ 05:00) (13 - 34)  SpO2: 98% ( @ 05:00) (91% - 100%)     @ 07:01  -  05-15 @ 07:00  --------------------------------------------------------  IN:  Total IN: 0 mL    OUT:    Indwelling Catheter - Urethral (mL): 1375 mL  Total OUT: 1375 mL    Total NET: -1375 mL      05-15 @ 07:01  -  16 @ 06:03  --------------------------------------------------------  IN:    Heparin Infusion: 66 mL    IV PiggyBack: 400 mL    Norepinephrine: 33 mL    Oral Fluid: 300 mL  Total IN: 799 mL    OUT:    Indwelling Catheter - Urethral (mL): 1950 mL  Total OUT: 1950 mL    Total NET: -1151 mL        Physical Exam:  Pt is AAOx3  General: Well developed, in no acute distress.   Chest: Lungs clear, no rales, no rhonchi, no wheezes.   Heart: RR, no murmurs, no rubs, no gallops.   Abdomen: Soft, mild diffuse tenderness, mildly distended, no masses, BS normal.    Back: Normal curvature, no tenderness.   Neuro: Physiological, no localizing findings.   Skin: Normal, no rashes, no lesions noted.   Extremities: Warm, well perfused, no edema, Pulses intact        16 @ 05:29                    10.7  CBC: 13.60>)-------(<145                     31.9                 - | - | -    CMP:  ----------------------< -               - | - | -                      Ca:-  Phos:-  Mg:-               -|      |-        LFTs:  ------|-|-----             -|      |-  05-15 @ 20:38                    10.5  CBC: 13.34>)-------(<127                     30.6                 145 | 116 | 26    CMP:  ----------------------< 90               3.4 | 23 | 1.28                      Ca:7.8  Phos:2.2  M.0               -|      |-        LFTs:  ------|-|-----             -|      |-  05-15 @ 14:18                    -  CBC: ->)-------(<-                     -                 143 | 114 | 26    CMP:  ----------------------< 67               3.7 | 21 | 1.43                      Ca:8.0  Phos:1.7  M.9               3.6|      |339        LFTs:  ------|95|-----             -|      |-  05-15 @ 13:43                    12.3  CBC: 20.27>)-------(<137                     35.5                 - | - | -    CMP:  ----------------------< -               - | - | -                      Ca:-  Phos:-  Mg:-               -|      |-        LFTs:  ------|-|-----             -|      |-  05-15 @ 06:43                    11.7  CBC: 21.81>)-------(<152                     34.4                 143 | 109 | 30    CMP:  ----------------------< 81               2.8 | 24 | 1.79                      Ca:7.7  Phos:2.2  M.1               3.7|      |406        LFTs:  ------|103|-----             -|      |-   23:53                    11.8  CBC: 15.64>)-------(<146                     35.0                 142 | 102 | 32    CMP:  ----------------------< 101               3.1 | 21 | 2.55                      Ca:8.4  Phos:-  M.9               3.2|      |353        LFTs:  ------|122|-----             -|      |-      Culture - Blood (collected 05-15-23 @ 01:37)  Source: .Blood Blood-Peripheral  Gram Stain (23 @ 03:59):    Growth in aerobic bottle: Gram Negative Rods    Growth in anaerobic bottle: Gram Negative Rods  Preliminary Report (23 @ 03:59):    Growth in aerobic bottle: Gram Negative Rods    Growth in anaerobic bottle: Gram Negative Rods    ***Blood Panel PCR results on this specimen are available    approximately 3 hours after the Gram stain result.***    Gram stain, PCR, and/or culture results may not always    correspond due to difference in methodologies.    ************************************************************    This PCR assay was performed by multiplex PCR. This    Assay tests for 66 bacterial and resistance gene targets.    Please refer to the [facility] test directory    at [email or web address] for details.  Organism: Blood Culture PCR (05-15-23 @ 19:24)  Organism: Blood Culture PCR (05-15-23 @ 19:24)      Method Type: PCR      -  K. pneumoniae group: Detec (K. pneumoniae, K. quasipneumoniae, K. variicola)    Culture - Blood (collected 23 @ 23:53)  Source: .Blood Blood-Peripheral  Gram Stain (05-15-23 @ 21:10):    Growth in anaerobic bottle: Gram Negative Rods  Preliminary Report (05-15-23 @ 21:10):    Growth in anaerobic bottle: Gram Negative Rods      Current Inpatient Medications:  atorvastatin 10 milliGRAM(s) Oral at bedtime  chlorhexidine 4% Liquid 1 Application(s) Topical <User Schedule>  dextrose 5% + lactated ringers. 1000 milliLiter(s) (50 mL/Hr) IV Continuous <Continuous>  norepinephrine Infusion 0.05 MICROgram(s)/kG/Min (6.17 mL/Hr) IV Continuous <Continuous>  OLANZapine 5 milliGRAM(s) Oral daily  pantoprazole    Tablet 40 milliGRAM(s) Oral before breakfast  piperacillin/tazobactam IVPB.. 3.375 Gram(s) IV Intermittent every 8 hours  potassium phosphate / sodium phosphate Powder (PHOS-NaK) 2 Packet(s) Oral once            ACC: [id number] EXAM: CT ABDOMEN AND PELVIS IC ORDERED BY: ROSS BRODY    PROCEDURE DATE: 05/15/2023        INTERPRETATION: CLINICAL INDICATION: abd pain fever    PROCEDURE:  Helical axial images were obtained from the domes of the diaphragm through the pubic symphysis following the administration of intravenous contrast. Coronal and sagittal reformats were also obtained.    CONTRAST/COMPLICATIONS:  IV Contrast: Omnipaque 350 90 cc administered 10 cc discarded  Oral Contrast: NONE  Complications: None reported at time of study completion    COMPARISON: 10/17/2022.    FINDINGS:    LOWER CHEST: Atelectasis. Aortic root calcification.    LIVER: No obvious lesion.  BILE DUCTS/GALLBLADDER: No obvious intraparenchymal dilatation. Stable common bile duct (0.7 cm). Distended gallbladder with gallbladder wall edema.  PANCREAS: Stable duct.  SPLEEN: Unremarkable.    ADRENALS: Unremarkable.  KIDNEYS/URETERS: Bilateral perinephric stranding without hydroureteronephrosis. Question striated bilateral nephrogram. Stable indeterminate right renal lesion and hypodensities.  BLADDER: Partially distended.  REPRODUCTIVE ORGANS: Normal size of the prostate.    BOWEL: No bowel obstruction. Unremarkable appendix. Sigmoid colon anastomosis. Colon diverticulosis. Wall thickening of the cecum, ascending and transverse colon with slight stranding.  PERITONEUM: No drainable fluid collection or free air.  VESSELS: Atherosclerosis. Normal caliber of the abdominal aorta. Hypodensities at the anterior branches of the right intrahepatic portal vein, likely thrombus. Patent portal vein, hepatic veins, SMV and splenic vein.  RETROPERITONEUM: Subcentimeter lymph nodes without lymphadenopathy.  ABDOMINAL WALL/SOFT TISSUES: Small fat-containing umbilical hernia.  BONES: Degenerative changes of the spine. Nonspecific small sclerotic foci at the pelvic bones.    IMPRESSION:    Distended gallbladder with gallbladder wall edema. ? cholecystitis. Recommend correlation and additional imaging.    Right intrahepatic portal vein thrombosis.    Partial distention versus colitis of the ascending/transverse colon. Recommend clinical correlation.    Bilateral perinephric stranding without hydroureteronephrosis. Question striated bilateral nephrogram. Recommend clinical correlation to assess urinary tract infection.    Additional findings as described.

## 2023-05-16 NOTE — CHART NOTE - NSCHARTNOTEFT_GEN_A_CORE
Spoke with IR regarding per cholecystostomy tube.  They are requesting repeat US prior to proceeding. Urgent US ordered.

## 2023-05-16 NOTE — PROGRESS NOTE ADULT - ASSESSMENT
72M with abdominal pain found to have elevated LFTS, hypotension, and imaging suggestive of cholecystitis    Plan:  MRCP showed 2mm filling defect with CBD dilation  S/p ERCP with sphincterotomy and stent placement  Patient will need cholecystostomy tube for cholecystitis  Consult IR  Continue IVF resuscitation  Trend LFTs  Serial abdominal exams  Continue medical management by critical care team    Plan discussed with Dr. Lindsey

## 2023-05-16 NOTE — PROGRESS NOTE ADULT - SUBJECTIVE AND OBJECTIVE BOX
HPI:  73 y/o pmhx HTN, gastritis, psych history presented to ED with x 2 days of abdominal pain. Associated vomiting, fever, chills. Hypotensive on admission to 70's systolic. Lab work significant for WBC 15.64 with L shift,, 19 % bandemia, Scr 2.55 ( baseline Scr 1.23 10/22), lactate  4.4, total bilirubin 3.2, alk phos 122, , . CT abd/pelvis significant for cholecystitis with gall bladder wall thickening, as well as R intrahepatic portal thrombosis.  Was given 3 L of IV fluids in ED without improvement in BP. ICU consulted for hypotension refractory IV crystalloid.  (15 May 2023 05:37)      Past Medical History, Family History, Social History:  PAST MEDICAL & SURGICAL HISTORY:  Alcohol abuse      Drug abuse      Schizophrenia      Dementia      No significant past surgical history        Interval History: ERCP  with sphincterotomy, stent placement and stone removal            Review of Systems:  Constitutional:  +intermittent fevers,  denies chills, or new weakness  Eyes: No double vision, no change in visual acuity, no blurry vision, no occular discharge  Neurologic: No new weakness, no headache, no vertigo   Ears, nose, mouth throat:  No ottorrhea. No change in hearing, No anosmia, No oral lesions, No sore throat  Cardiovascular: No palpitations, no chest pain, no nocturnal or positional dyspnea.  Respiratory: No shortness of breath, No Cough  Gastrointestinal: No change in bowel habits, no change in appetite  Genitourinary: No Frequency, No Dysuria, no Hematuria  Musculoskeletal: No muscle wasting, No new weakness  Endocrine: Denies history of DM, denies history of thyroid disease, No heat or cold intolerance  Allergic / Immune: No active allergies unless otherwise specified in medical chart  All other systems reviewed and are negative      Physical Exam:  Constitutional: NAD resting comfortably in CCU bed   Neuro: Awake, alert, answering questions, non focal   Cardiovascular:  RRR  Eyes: PERRL EOMI   ENT: No JVD, Trachea Midline.  Respiratory:  symmetric chest rise, non labored breathing   Gastrointestinal: Soft, nontender, nondistended   Genitourinary: cristobal catheter in place   Musculoskeletal: No muscle wasting noted, No pretibial edema appreciated, no appreciable calf tenderness.  Skin:  CDI   Hematologic / Lymph / Immunologic: No bleeding from IV sites or wounds, No Hives or allergic type skin lesions      Vitals:  Vital Signs Last 24 Hrs  T(C): 37.6 (15 May 2023 22:00), Max: 37.9 (15 May 2023 19:45)  T(F): 99.7 (15 May 2023 22:00), Max: 100.2 (15 May 2023 19:45)  HR: 66 (16 May 2023 05:00) (60 - 123)  BP: 113/60 (16 May 2023 05:00) (86/52 - 130/65)  BP(mean): 73 (16 May 2023 05:00) (56 - 84)  RR: 21 (16 May 2023 05:00) (13 - 34)  SpO2: 98% (16 May 2023 05:00) (91% - 100%)    I&O:  I&O's Summary    14 May 2023 07:01  -  15 May 2023 07:00  --------------------------------------------------------  IN: 0 mL / OUT: 1375 mL / NET: -1375 mL    15 May 2023 07:01  -  16 May 2023 05:49  --------------------------------------------------------  IN: 799 mL / OUT: 1950 mL / NET: -1151 mL        Labs & Radiology:                        10.5   13.34 )-----------( 127      ( 15 May 2023 20:38 )             30.6       05-15    145  |  116<H>  |  26<H>  ----------------------------<  90  3.4<L>   |  23  |  1.28    Ca    7.8<L>      15 May 2023 20:38  Phos  2.2     05-15  Mg     2.0     05-15    TPro  6.2  /  Alb  2.8<L>  /  TBili  3.6<H>  /  DBili  x   /  AST  339<H>  /  ALT  234<H>  /  AlkPhos  95  05-15      LIVER FUNCTIONS - ( 15 May 2023 14:18 )  Alb: 2.8 g/dL / Pro: 6.2 gm/dL / ALK PHOS: 95 U/L / ALT: 234 U/L / AST: 339 U/L / GGT: x             PT/INR - ( 14 May 2023 23:53 )   PT: 20.8 sec;   INR: 1.78 ratio    PTT - ( 15 May 2023 13:43 )  PTT:166.2 sec      Culture - Blood (collected 15 May 2023 01:37)  Source: .Blood Blood-Peripheral  Gram Stain (16 May 2023 03:59):    Growth in aerobic bottle: Gram Negative Rods    Growth in anaerobic bottle: Gram Negative Rods  Preliminary Report (16 May 2023 03:59):    Growth in aerobic bottle: Gram Negative Rods    Growth in anaerobic bottle: Gram Negative Rods    ***Blood Panel PCR results on this specimen are available    approximately 3 hours after the Gram stain result.***    Gram stain, PCR, and/or culture results may not always    correspond due to difference in methodologies.    ************************************************************    This PCR assay was performed by multiplex PCR. This    Assay tests for 66 bacterial and resistance gene targets.    Please refer to the [facility] test directory    at [email or web address] for details.  Organism: Blood Culture PCR (15 May 2023 19:24)  Organism: Blood Culture PCR (15 May 2023 19:24)    Culture - Blood (collected 14 May 2023 23:53)  Source: .Blood Blood-Peripheral  Gram Stain (15 May 2023 21:10):    Growth in anaerobic bottle: Gram Negative Rods  Preliminary Report (15 May 2023 21:10):    Growth in anaerobic bottle: Gram Negative Rods        CAPILLARY BLOOD GLUCOSE  POCT Blood Glucose.: 97 mg/dL (15 May 2023 23:39)    < from: MR ALCAZAR No Cont (05.15.23 @ 15:11) >  ACC: [id number] EXAM:  MR ALCAZAR   ORDERED BY: DANIEL KATIE     PROCEDURE DATE:  05/15/2023          INTERPRETATION:  CLINICAL INFORMATION: Hepatitis with generalized   abdominal pain and elevated LFTs, sepsis    COMPARISON: Same day ultrasound and CT    CONTRAST/COMPLICATIONS:  IV Contrast: NONE  Oral Contrast: NONE  Complications: None reported at time of study completion    PROCEDURE:  MRI/MRCP was performed. Radial and 3D MRCP sequences were obtained.    FINDINGS:  LOWER CHEST: Trace bilateral effusions.    LIVER: Normal size and morphology. Diffusely restricted diffusion   compatible with hepatitis.  BILE DUCTS: 2 mm stone in the distal CBD with 7 mm CBD dilatation. No   intrahepatic dilatation.  GALLBLADDER: Gallbladder is  mildly distended without stones. Diffuse   nonspecific wall edema.  SPLEEN: Normal.  PANCREAS: Normal.  ADRENALS: Normal.  KIDNEYS/URETERS: No hydronephrosis.    VISUALIZED PORTIONS:  BOWEL: No bowel dilatation. Normal appendix.  PERITONEUM: Trace pelvic free fluid.  VESSELS: Aortic atherosclerosis without aneurysm.  RETROPERITONEUM/LYMPH NODES: No adenopathy.  ABDOMINAL WALL: Normal.  BONES: No aggressive lesion.    IMPRESSION:  *  Portal vein thrombosis on same-day CT is not visualized on noncontrast   MRI.  * Restricted diffusion throughout the liver parenchyma compatible with   acute hepatitis.  *  Diffuse nonspecific gallbladder wall edema without stones is likely   reactive to hepatitis.  *  2 mm distal choledocholithiasis causing 7 mm CBD dilatation.      --- End of Report ---            ELBA VILLANUEVA MD; Attending Radiologist  This document has been electronically signed. May 15 2023  4:05PM    < end of copied text >      Medications:  MEDICATIONS  (STANDING):  atorvastatin 10 milliGRAM(s) Oral at bedtime  chlorhexidine 4% Liquid 1 Application(s) Topical <User Schedule>  dextrose 5% + lactated ringers. 1000 milliLiter(s) (50 mL/Hr) IV Continuous <Continuous>  norepinephrine Infusion 0.05 MICROgram(s)/kG/Min (6.17 mL/Hr) IV Continuous <Continuous>  OLANZapine 5 milliGRAM(s) Oral daily  pantoprazole    Tablet 40 milliGRAM(s) Oral before breakfast  piperacillin/tazobactam IVPB.. 3.375 Gram(s) IV Intermittent every 8 hours  potassium phosphate / sodium phosphate Powder (PHOS-NaK) 2 Packet(s) Oral once

## 2023-05-16 NOTE — PROGRESS NOTE ADULT - SUBJECTIVE AND OBJECTIVE BOX
Interval HPI:  feels better today    Exam:  alert and oriented  on minimal pressor  abd soft nontender  reg rhythm  lungs clear    On Admission  05-15-23 (1d)  HPI:  HPI:   73 y/o pmhx HTN, gastritis, psych history presented to ED with x 2 days of abdominal pain. Associated vomiting, fever, chills. Hypotensive on admission to 70's systolic. Lab work significant for WBC 15.64 with L shift,, 19 % bandemia, Scr 2.55 ( baseline Scr 1.23 10/22), lactate  4.4, total bilirubin 3.2, alk phos 122, , . CT abd/pelvis significant for cholecystitis with gall bladder wall thickening, as well as R intrahepatic portal thrombosis.  Was given 3 L of IV fluids in ED without improvement in BP. ICU consulted for hypotension refractory IV crystalloid.  (15 May 2023 05:37)    PAST MEDICAL & SURGICAL HISTORY:  Alcohol abuse      Drug abuse      Schizophrenia      Dementia      No significant past surgical history          Antimicrobial:  piperacillin/tazobactam IVPB.. 3.375 Gram(s) IV Intermittent every 8 hours    Cardiovascular:  norepinephrine Infusion 0.05 MICROgram(s)/kG/Min IV Continuous <Continuous>    Pulmonary:    Hematalogic:  heparin   Injectable 5000 Unit(s) IV Push once  heparin   Injectable 2500 Unit(s) IV Push every 6 hours PRN  heparin   Injectable 5000 Unit(s) IV Push every 6 hours PRN  heparin  Infusion.  Unit(s)/Hr IV Continuous <Continuous>    Other:  atorvastatin 10 milliGRAM(s) Oral at bedtime  chlorhexidine 4% Liquid 1 Application(s) Topical <User Schedule>  dextrose 5% + lactated ringers. 1000 milliLiter(s) IV Continuous <Continuous>  OLANZapine 5 milliGRAM(s) Oral daily  pantoprazole    Tablet 40 milliGRAM(s) Oral before breakfast      Drug Dosing Weight  Height (cm): 157.5 (14 May 2023 23:44)  Weight (kg): 60.8 (15 May 2023 05:00)  BMI (kg/m2): 24.5 (15 May 2023 05:00)  BSA (m2): 1.61 (15 May 2023 05:00)    T(C): 36.6 (23 @ 09:11), Max: 37.9 (05-15-23 @ 19:45)  HR: 69 (23 @ 14:00)  BP: 112/68 (23 @ 14:00)  BP(mean): 79 (23 @ 14:00)  ABP: --  ABP(mean): --  RR: 20 (23 @ 14:00)  SpO2: 98% (23 @ 14:00)          05-15 @ 07:01  -   @ 07:00  --------------------------------------------------------  IN: 1999 mL / OUT: 2500 mL / NET: -501 mL              LABS:  CBC Full  -  ( 16 May 2023 05:29 )  WBC Count : 13.60 K/uL  RBC Count : 3.74 M/uL  Hemoglobin : 10.7 g/dL  Hematocrit : 31.9 %  Platelet Count - Automated : 145 K/uL  Mean Cell Volume : 85.3 fl  Mean Cell Hemoglobin : 28.6 pg  Mean Cell Hemoglobin Concentration : 33.5 gm/dL  Auto Neutrophil # : x  Auto Lymphocyte # : x  Auto Monocyte # : x  Auto Eosinophil # : x  Auto Basophil # : x  Auto Neutrophil % : x  Auto Lymphocyte % : x  Auto Monocyte % : x  Auto Eosinophil % : x  Auto Basophil % : x        142  |  114<H>  |  30<H>  ----------------------------<  125<H>  3.2<L>   |  20<L>  |  1.31<H>    Ca    7.9<L>      16 May 2023 05:29  Phos  3.1       Mg     2.2         TPro  5.8<L>  /  Alb  2.5<L>  /  TBili  3.0<H>  /  DBili  x   /  AST  184<H>  /  ALT  166<H>  /  AlkPhos  120      PT/INR - ( 14 May 2023 23:53 )   PT: 20.8 sec;   INR: 1.78 ratio         PTT - ( 15 May 2023 13:43 )  PTT:166.2 sec  Urinalysis Basic - ( 15 May 2023 04:14 )    Color: Yellow / Appearance: Clear / S.005 / pH: x  Gluc: x / Ketone: Trace  / Bili: Moderate / Urobili: 4   Blood: x / Protein: 15 / Nitrite: Negative   Leuk Esterase: Trace / RBC: 3-5 /HPF / WBC 11-25 /HPF   Sq Epi: x / Non Sq Epi: x / Bacteria: Occasional      Culture Results:   No growth (05-15 @ 04:14)  Culture Results:   Growth in aerobic bottle: Klebsiella pneumoniae  Growth in anaerobic bottle: Gram Negative Rods  ***Blood Panel PCR results on this specimen are available  approximately 3 hours after the Gram stain result.***  Gram stain, PCR, and/or culture results may not always  correspond due to difference in methodologies.  ************************************************************  This PCR assay was performed by multiplex PCR. This  Assay tests for 66 bacterial and resistance gene targets.  Please refer to the [facility] test directory  at [email or web address] for details. (05-15 @ 01:37)  Culture Results:   Growth in anaerobic bottle: Gram Negative Rods  Growth in aerobic bottle: Gram Negative Rods ( @ 23:53)    ____________________________________________________________________________________________________   Was paged by Nyasia Oliva from [facility] at 1:35 pm Friday, called back, no answer, left a message for her to call back or contact DR Amanda Sotomayor on Monday morning since I am not sure if Dr OMALLEY is in office this afternoon or not.

## 2023-05-17 LAB
-  AMIKACIN: SIGNIFICANT CHANGE UP
-  AMPICILLIN/SULBACTAM: SIGNIFICANT CHANGE UP
-  AMPICILLIN: SIGNIFICANT CHANGE UP
-  AZTREONAM: SIGNIFICANT CHANGE UP
-  CEFAZOLIN: SIGNIFICANT CHANGE UP
-  CEFEPIME: SIGNIFICANT CHANGE UP
-  CEFOXITIN: SIGNIFICANT CHANGE UP
-  CEFTRIAXONE: SIGNIFICANT CHANGE UP
-  CIPROFLOXACIN: SIGNIFICANT CHANGE UP
-  ERTAPENEM: SIGNIFICANT CHANGE UP
-  GENTAMICIN: SIGNIFICANT CHANGE UP
-  IMIPENEM: SIGNIFICANT CHANGE UP
-  LEVOFLOXACIN: SIGNIFICANT CHANGE UP
-  MEROPENEM: SIGNIFICANT CHANGE UP
-  PIPERACILLIN/TAZOBACTAM: SIGNIFICANT CHANGE UP
-  TOBRAMYCIN: SIGNIFICANT CHANGE UP
-  TRIMETHOPRIM/SULFAMETHOXAZOLE: SIGNIFICANT CHANGE UP
ALBUMIN SERPL ELPH-MCNC: 2.3 G/DL — LOW (ref 3.3–5)
ALP SERPL-CCNC: 207 U/L — HIGH (ref 40–120)
ALT FLD-CCNC: 120 U/L — HIGH (ref 12–78)
ANION GAP SERPL CALC-SCNC: 7 MMOL/L — SIGNIFICANT CHANGE UP (ref 5–17)
APTT BLD: 58.9 SEC — HIGH (ref 27.5–35.5)
AST SERPL-CCNC: 88 U/L — HIGH (ref 15–37)
BILIRUB SERPL-MCNC: 2.5 MG/DL — HIGH (ref 0.2–1.2)
BUN SERPL-MCNC: 16 MG/DL — SIGNIFICANT CHANGE UP (ref 7–23)
CALCIUM SERPL-MCNC: 8 MG/DL — LOW (ref 8.5–10.1)
CHLORIDE SERPL-SCNC: 116 MMOL/L — HIGH (ref 96–108)
CO2 SERPL-SCNC: 20 MMOL/L — LOW (ref 22–31)
CREAT SERPL-MCNC: 0.95 MG/DL — SIGNIFICANT CHANGE UP (ref 0.5–1.3)
CULTURE RESULTS: SIGNIFICANT CHANGE UP
CULTURE RESULTS: SIGNIFICANT CHANGE UP
EGFR: 85 ML/MIN/1.73M2 — SIGNIFICANT CHANGE UP
GLUCOSE SERPL-MCNC: 99 MG/DL — SIGNIFICANT CHANGE UP (ref 70–99)
HCT VFR BLD CALC: 32 % — LOW (ref 39–50)
HGB BLD-MCNC: 10.9 G/DL — LOW (ref 13–17)
MAGNESIUM SERPL-MCNC: 1.7 MG/DL — SIGNIFICANT CHANGE UP (ref 1.6–2.6)
MCHC RBC-ENTMCNC: 28.2 PG — SIGNIFICANT CHANGE UP (ref 27–34)
MCHC RBC-ENTMCNC: 34.1 GM/DL — SIGNIFICANT CHANGE UP (ref 32–36)
MCV RBC AUTO: 82.9 FL — SIGNIFICANT CHANGE UP (ref 80–100)
METHOD TYPE: SIGNIFICANT CHANGE UP
ORGANISM # SPEC MICROSCOPIC CNT: SIGNIFICANT CHANGE UP
PHOSPHATE SERPL-MCNC: 1.8 MG/DL — LOW (ref 2.5–4.5)
PLATELET # BLD AUTO: 138 K/UL — LOW (ref 150–400)
POTASSIUM SERPL-MCNC: 3.2 MMOL/L — LOW (ref 3.5–5.3)
POTASSIUM SERPL-SCNC: 3.2 MMOL/L — LOW (ref 3.5–5.3)
PROT SERPL-MCNC: 5.9 GM/DL — LOW (ref 6–8.3)
RBC # BLD: 3.86 M/UL — LOW (ref 4.2–5.8)
RBC # FLD: 15 % — HIGH (ref 10.3–14.5)
SODIUM SERPL-SCNC: 143 MMOL/L — SIGNIFICANT CHANGE UP (ref 135–145)
SPECIMEN SOURCE: SIGNIFICANT CHANGE UP
SPECIMEN SOURCE: SIGNIFICANT CHANGE UP
WBC # BLD: 10.9 K/UL — HIGH (ref 3.8–10.5)
WBC # FLD AUTO: 10.9 K/UL — HIGH (ref 3.8–10.5)

## 2023-05-17 PROCEDURE — 99233 SBSQ HOSP IP/OBS HIGH 50: CPT

## 2023-05-17 RX ORDER — MAGNESIUM SULFATE 500 MG/ML
2 VIAL (ML) INJECTION ONCE
Refills: 0 | Status: COMPLETED | OUTPATIENT
Start: 2023-05-17 | End: 2023-05-17

## 2023-05-17 RX ORDER — ONDANSETRON 8 MG/1
4 TABLET, FILM COATED ORAL EVERY 8 HOURS
Refills: 0 | Status: DISCONTINUED | OUTPATIENT
Start: 2023-05-17 | End: 2023-05-23

## 2023-05-17 RX ORDER — POTASSIUM CHLORIDE 20 MEQ
40 PACKET (EA) ORAL ONCE
Refills: 0 | Status: COMPLETED | OUTPATIENT
Start: 2023-05-17 | End: 2023-05-17

## 2023-05-17 RX ORDER — METOPROLOL TARTRATE 50 MG
12.5 TABLET ORAL EVERY 12 HOURS
Refills: 0 | Status: DISCONTINUED | OUTPATIENT
Start: 2023-05-17 | End: 2023-05-23

## 2023-05-17 RX ORDER — POTASSIUM PHOSPHATE, MONOBASIC POTASSIUM PHOSPHATE, DIBASIC 236; 224 MG/ML; MG/ML
15 INJECTION, SOLUTION INTRAVENOUS ONCE
Refills: 0 | Status: COMPLETED | OUTPATIENT
Start: 2023-05-17 | End: 2023-05-17

## 2023-05-17 RX ORDER — ACETAMINOPHEN 500 MG
650 TABLET ORAL EVERY 6 HOURS
Refills: 0 | Status: DISCONTINUED | OUTPATIENT
Start: 2023-05-17 | End: 2023-05-23

## 2023-05-17 RX ADMIN — Medication 12.5 MILLIGRAM(S): at 21:16

## 2023-05-17 RX ADMIN — PANTOPRAZOLE SODIUM 40 MILLIGRAM(S): 20 TABLET, DELAYED RELEASE ORAL at 05:30

## 2023-05-17 RX ADMIN — PIPERACILLIN AND TAZOBACTAM 25 GRAM(S): 4; .5 INJECTION, POWDER, LYOPHILIZED, FOR SOLUTION INTRAVENOUS at 21:16

## 2023-05-17 RX ADMIN — ATORVASTATIN CALCIUM 10 MILLIGRAM(S): 80 TABLET, FILM COATED ORAL at 21:17

## 2023-05-17 RX ADMIN — CHLORHEXIDINE GLUCONATE 1 APPLICATION(S): 213 SOLUTION TOPICAL at 09:05

## 2023-05-17 RX ADMIN — HEPARIN SODIUM 1100 UNIT(S)/HR: 5000 INJECTION INTRAVENOUS; SUBCUTANEOUS at 07:32

## 2023-05-17 RX ADMIN — Medication 25 GRAM(S): at 09:05

## 2023-05-17 RX ADMIN — PIPERACILLIN AND TAZOBACTAM 25 GRAM(S): 4; .5 INJECTION, POWDER, LYOPHILIZED, FOR SOLUTION INTRAVENOUS at 14:00

## 2023-05-17 RX ADMIN — POTASSIUM PHOSPHATE, MONOBASIC POTASSIUM PHOSPHATE, DIBASIC 62.5 MILLIMOLE(S): 236; 224 INJECTION, SOLUTION INTRAVENOUS at 09:05

## 2023-05-17 RX ADMIN — HEPARIN SODIUM 1100 UNIT(S)/HR: 5000 INJECTION INTRAVENOUS; SUBCUTANEOUS at 06:23

## 2023-05-17 RX ADMIN — PIPERACILLIN AND TAZOBACTAM 25 GRAM(S): 4; .5 INJECTION, POWDER, LYOPHILIZED, FOR SOLUTION INTRAVENOUS at 05:30

## 2023-05-17 RX ADMIN — OLANZAPINE 5 MILLIGRAM(S): 15 TABLET, FILM COATED ORAL at 09:04

## 2023-05-17 RX ADMIN — Medication 40 MILLIEQUIVALENT(S): at 09:04

## 2023-05-17 NOTE — CONSULT NOTE ADULT - SUBJECTIVE AND OBJECTIVE BOX
Patient is a 72y old  Male who presents with a chief complaint of CC: Septic Shock     HPI:  71 y/o h/o HTN, gastritis, psych history was admitted on 5/15 for abdominal pain x 2 days associated vomiting, fever, chills. In ER she was noted hypotensive to 70's systolic. She was given 3 L of IV fluids in ED without improvement in BP. ICU consulted for hypotension refractory IV crystalloid. She received zosyn. She was reported with bacteremia.     PMH: as above  PSH: as above  Meds: per reconciliation sheet, noted below  MEDICATIONS  (STANDING):  atorvastatin 10 milliGRAM(s) Oral at bedtime  heparin  Infusion.  Unit(s)/Hr (11 mL/Hr) IV Continuous <Continuous>  OLANZapine 5 milliGRAM(s) Oral daily  pantoprazole    Tablet 40 milliGRAM(s) Oral before breakfast  piperacillin/tazobactam IVPB.. 3.375 Gram(s) IV Intermittent every 8 hours    MEDICATIONS  (PRN):  acetaminophen     Tablet .. 650 milliGRAM(s) Oral every 6 hours PRN Temp greater or equal to 38C (100.4F), Mild Pain (1 - 3)  aluminum hydroxide/magnesium hydroxide/simethicone Suspension 30 milliLiter(s) Oral every 4 hours PRN Dyspepsia  ondansetron Injectable 4 milliGRAM(s) IV Push every 8 hours PRN Nausea and/or Vomiting    Allergies    No Known Allergies    Intolerances      Social: no smoking, no alcohol, no illegal drugs; no recent travel, no exposure to TB  FAMILY HISTORY:    no history of premature cardiovascular disease in first degree relatives    ROS: the patient denies HA, no seizures, no dizziness, no sore throat, no nasal congestion, no blurry vision, no CP, no palpitations, no SOB, no cough, has abdominal pain, no diarrhea, no N/V, no dysuria, no leg pain, no claudication, no rash, no joint aches, no rectal pain or bleeding, no night sweats  All other systems reviewed and are negative    Vital Signs Last 24 Hrs  T(C): 36.4 (17 May 2023 10:11), Max: 37.1 (17 May 2023 04:00)  T(F): 97.6 (17 May 2023 10:11), Max: 98.7 (17 May 2023 04:00)  HR: 88 (17 May 2023 11:00) (65 - 103)  BP: 129/75 (17 May 2023 07:00) (92/62 - 132/65)  BP(mean): 88 (17 May 2023 07:00) (67 - 113)  RR: 16 (17 May 2023 11:00) (14 - 31)  SpO2: 92% (17 May 2023 07:00) (92% - 100%)    Parameters below as of 17 May 2023 05:00  Patient On (Oxygen Delivery Method): room air    PE:    Constitutional:  No acute distress  HEENT: NC/AT, EOMI, PERRLA, conjunctivae clear; ears and nose atraumatic; pharynx benign  Neck: supple; thyroid not palpable  Back: no tenderness  Respiratory: respiratory effort normal; clear to auscultation  Cardiovascular: S1S2 regular, no murmurs  Abdomen: soft, mid abdomen tender, not distended, positive BS; no liver or spleen organomegaly  Genitourinary: no suprapubic tenderness  Lymphatic: no LN palpable  Musculoskeletal: no muscle tenderness, no joint swelling or tenderness  Extremities: no pedal edema  Neurological/ Psychiatric: AxOx3, judgement and insight normal; moving all extremities  Skin: no rashes; no palpable lesions    Labs: all available labs reviewed                        10.9   10.90 )-----------( 138      ( 17 May 2023 05:34 )             32.0     05-17    143  |  116<H>  |  16  ----------------------------<  99  3.2<L>   |  20<L>  |  0.95    Ca    8.0<L>      17 May 2023 05:34  Phos  1.8     05-17  Mg     1.7     05-17    TPro  5.9<L>  /  Alb  2.3<L>  /  TBili  2.5<H>  /  DBili  x   /  AST  88<H>  /  ALT  120<H>  /  AlkPhos  207<H>  05-17     LIVER FUNCTIONS - ( 17 May 2023 05:34 )  Alb: 2.3 g/dL / Pro: 5.9 gm/dL / ALK PHOS: 207 U/L / ALT: 120 U/L / AST: 88 U/L / GGT: x           (05-14 @ 23:53)  NotDetec      Culture - Blood (collected 16 May 2023 05:29)  Source: .Blood None  Preliminary Report (17 May 2023 09:02):    No growth to date.    Culture - Blood (collected 16 May 2023 05:26)  Source: .Blood None  Preliminary Report (17 May 2023 09:02):    No growth to date.    Culture - Urine (collected 15 May 2023 04:14)  Source: Clean Catch Clean Catch (Midstream)  Final Report (16 May 2023 07:11):    No growth    Culture - Blood (collected 15 May 2023 01:37)  Source: .Blood Blood-Peripheral  Gram Stain (16 May 2023 03:59):    Growth in aerobic bottle: Gram Negative Rods    Growth in anaerobic bottle: Gram Negative Rods  Final Report (17 May 2023 06:51):    Growth in aerobic and anaerobic bottles: Klebsiella pneumoniae  Organism: Blood Culture PCR  Klebsiella pneumoniae (17 May 2023 06:51)  Organism: Klebsiella pneumoniae (17 May 2023 06:51)      Method Type: KATYA      -  Amikacin: S <=16      -  Ampicillin: R >16 These ampicillin results predict results for amoxicillin      -  Ampicillin/Sulbactam: S <=4/2 Enterobacter, Klebsiella aerogenes, Citrobacter, and Serratia may develop resistance during prolonged therapy (3-4 days)      -  Aztreonam: S <=4      -  Cefazolin: S <=2 Enterobacter, Klebsiella aerogenes, Citrobacter, and Serratia may develop resistance during prolonged therapy (3-4 days)      -  Cefepime: S <=2      -  Cefoxitin: S <=8      -  Ceftriaxone: S <=1 Enterobacter, Klebsiella aerogenes, Citrobacter, and Serratia may develop resistance during prolonged therapy      -  Ciprofloxacin: S <=0.25      -  Ertapenem: S <=0.5      -  Gentamicin: S <=2      -  Imipenem: S <=1      -  Levofloxacin: S <=0.5      -  Meropenem: S <=1      -  Piperacillin/Tazobactam: S <=8      -  Tobramycin: S <=2      -  Trimethoprim/Sulfamethoxazole: S <=0.5/9.5  Organism: Blood Culture PCR (17 May 2023 06:51)      Method Type: PCR      -  K. pneumoniae group: Detec (K. pneumoniae, K. quasipneumoniae, K. variicola)    Culture - Blood (collected 14 May 2023 23:53)  Source: .Blood Blood-Peripheral  Gram Stain (16 May 2023 07:20):    Growth in anaerobic bottle: Gram Negative Rods    Growth in aerobic bottle: Gram Negative Rods  Final Report (17 May 2023 06:52):    Growth in aerobic and anaerobic bottles: Klebsiella pneumoniae    See previous culture [id number]    Radiology: all available radiological tests reviewed    < from: US Abdomen Upper Quadrant Right (05.16.23 @ 12:30) >  Common bile duct stent noted within the gallbladder.  Gallbladder wall thickening.  < end of copied text >    < from: CT Abdomen and Pelvis w/ IV Cont (05.15.23 @ 01:16) >  Distended gallbladder with gallbladder wall edema. ? cholecystitis.   Recommend correlation and additional imaging.  Right intrahepatic portal vein thrombosis.  Partial distention versus colitis of the ascending/transverse colon.   Bilateral perinephric stranding without hydroureteronephrosis. Question   striated bilateral nephrogram. Recommend clinical correlation to assess urinary tract infection.  < end of copied text >      Advanced directives addressed: full resuscitation

## 2023-05-17 NOTE — PROGRESS NOTE ADULT - ASSESSMENT
71 y/o male with PMH HTN, HLD, gastritis,     Admitted for septic shock from acute cholangitis from an obstructing CBD stone   Klebsiella bacteremia   PV thrombosis     S/p ERCP with biliary stent placement on 5/15     Now clinically stable       Plan:     Continue Zosyn   Consult ID   Repeat BCx NGTD   No [place], lines  Continue iv heparin   Prerenal ALYSSA - improving, d/c IVF   Replete lytes   Surgery, GI, hematology f/u   TTE with diastolic dysfn, pulm HTN, will resume low dose BB   OOB     Stable for floor 71 y/o male with PMH HTN, HLD, gastritis,     Admitted for septic shock from acute cholangitis from an obstructing CBD stone   Klebsiella bacteremia   PV thrombosis     S/p ERCP with biliary stent placement on 5/15     Now clinically stable       Plan:     Continue Zosyn   Consult ID   Repeat BCx NGTD   No [place], lines  Continue iv heparin   Prerenal ALYSSA - improving, d/c IVF   Replete lytes   Surgery, GI, hematology f/u   TTE with diastolic dysfn, pulm HTN, will resume low dose BB   OOB     Stable for floor    Addendum: called hospitalist phone twice ([phone]), left a VM with callback number  71 y/o male with PMH HTN, HLD, gastritis,     Admitted for septic shock from acute cholangitis from an obstructing CBD stone   Klebsiella bacteremia   PV thrombosis     S/p ERCP with biliary stent placement on 5/15     Now clinically stable       Plan:     Continue Zosyn   Consult ID   Repeat BCx NGTD   No [place], lines  Continue iv heparin   Prerenal ALYSSA - improving, d/c IVF   Replete lytes   Surgery, GI, hematology f/u   TTE with diastolic dysfn, pulm HTN, will resume low dose BB   OOB     Stable for floor    Addendum: called hospitalist phone twice ([phone]), left a VM with callback number     Addendum: sign out given to Dr Mckay [id number]

## 2023-05-17 NOTE — PROGRESS NOTE ADULT - SUBJECTIVE AND OBJECTIVE BOX
Patient is a 72y old  Male who presents with a chief complaint of CC: Septic Shock (17 May 2023 11:32)    24 hour events: off levo since 12 pm on 5/16     Allergies    No Known Allergies    Intolerances      REVIEW OF SYSTEMS: SEE BELOW       ICU Vital Signs Last 24 Hrs  T(C): 36.4 (17 May 2023 10:11), Max: 37.1 (17 May 2023 04:00)  T(F): 97.6 (17 May 2023 10:11), Max: 98.7 (17 May 2023 04:00)  HR: 88 (17 May 2023 11:00) (69 - 103)  BP: 124/73 (17 May 2023 11:00) (92/62 - 132/65)  BP(mean): 85 (17 May 2023 11:00) (67 - 113)  ABP: --  ABP(mean): --  RR: 16 (17 May 2023 11:00) (16 - 31)  SpO2: 92% (17 May 2023 07:00) (92% - 100%)    O2 Parameters below as of 17 May 2023 05:00  Patient On (Oxygen Delivery Method): room air            CAPILLARY BLOOD GLUCOSE      POCT Blood Glucose.: 135 mg/dL (16 May 2023 18:31)      I&O's Summary    16 May 2023 07:01  -  17 May 2023 07:00  --------------------------------------------------------  IN: 1961 mL / OUT: 920 mL / NET: 1041 mL            MEDICATIONS  (STANDING):  atorvastatin 10 milliGRAM(s) Oral at bedtime  heparin  Infusion.  Unit(s)/Hr (11 mL/Hr) IV Continuous <Continuous>  OLANZapine 5 milliGRAM(s) Oral daily  pantoprazole    Tablet 40 milliGRAM(s) Oral before breakfast  piperacillin/tazobactam IVPB.. 3.375 Gram(s) IV Intermittent every 8 hours      MEDICATIONS  (PRN):  acetaminophen     Tablet .. 650 milliGRAM(s) Oral every 6 hours PRN Temp greater or equal to 38C (100.4F), Mild Pain (1 - 3)  aluminum hydroxide/magnesium hydroxide/simethicone Suspension 30 milliLiter(s) Oral every 4 hours PRN Dyspepsia  ondansetron Injectable 4 milliGRAM(s) IV Push every 8 hours PRN Nausea and/or Vomiting      PHYSICAL EXAM: SEE BELOW                          10.9   10.90 )-----------( 138      ( 17 May 2023 05:34 )             32.0       05-17    143  |  116<H>  |  16  ----------------------------<  99  3.2<L>   |  20<L>  |  0.95    Ca    8.0<L>      17 May 2023 05:34  Phos  1.8     05-17  Mg     1.7     05-17    TPro  5.9<L>  /  Alb  2.3<L>  /  TBili  2.5<H>  /  DBili  x   /  AST  88<H>  /  ALT  120<H>  /  AlkPhos  207<H>  05-17          PTT - ( 17 May 2023 05:32 )  PTT:58.9 sec    .Blood None   No growth to date. -- 05-16 @ 05:29  .Blood None   No growth to date. -- 05-16 @ 05:26  Clean Catch Clean Catch (Midstream)   No growth -- 05-15 @ 04:14  .Blood Blood-Peripheral   Growth in aerobic and anaerobic bottles: Klebsiella pneumoniae  ***Blood Panel PCR results on this specimen are available  approximately 3 hours after the Gram stain result.***  Gram stain, PCR, and/or culture results may not always  correspond dueto difference in methodologies.  ************************************************************  This PCR assay was performed by multiplex PCR. This  Assay tests for 66 bacterial and resistance gene targets.  Please refer to the [facility] test directory  at [email or web address] for details.   Growth in aerobic bottle: Gram Negative Rods  Growth in anaerobic bottle: Gram Negative Rods 05-15 @ 01:37  .Blood Blood-Peripheral   Growth in aerobic and anaerobic bottles: Klebsiella pneumoniae  See previous culture [id number]   Growth in anaerobic bottle: Gram Negative Rods  Growth in aerobic bottle: Gram Negative Rods 05-14 @ 23:53      Rapid RVP Result: NotDetec (05-14 @ 23:53)

## 2023-05-17 NOTE — PROGRESS NOTE ADULT - TIME BILLING
coordinating care with surgery, GI, IR, CCU RN, reviewing labs and prior records, personally reviewing and interpreting imaging, documenting findings and assessment in the medical record.
.

## 2023-05-18 LAB
ANION GAP SERPL CALC-SCNC: 7 MMOL/L — SIGNIFICANT CHANGE UP (ref 5–17)
APTT BLD: 74.2 SEC — HIGH (ref 27.5–35.5)
BUN SERPL-MCNC: 13 MG/DL — SIGNIFICANT CHANGE UP (ref 7–23)
CALCIUM SERPL-MCNC: 8.8 MG/DL — SIGNIFICANT CHANGE UP (ref 8.5–10.1)
CHLORIDE SERPL-SCNC: 113 MMOL/L — HIGH (ref 96–108)
CO2 SERPL-SCNC: 20 MMOL/L — LOW (ref 22–31)
CREAT SERPL-MCNC: 0.87 MG/DL — SIGNIFICANT CHANGE UP (ref 0.5–1.3)
EGFR: 91 ML/MIN/1.73M2 — SIGNIFICANT CHANGE UP
GLUCOSE SERPL-MCNC: 93 MG/DL — SIGNIFICANT CHANGE UP (ref 70–99)
GRAM STN FLD: SIGNIFICANT CHANGE UP
HCT VFR BLD CALC: 35.7 % — LOW (ref 39–50)
HGB BLD-MCNC: 12.3 G/DL — LOW (ref 13–17)
MAGNESIUM SERPL-MCNC: 2.1 MG/DL — SIGNIFICANT CHANGE UP (ref 1.6–2.6)
MCHC RBC-ENTMCNC: 28.3 PG — SIGNIFICANT CHANGE UP (ref 27–34)
MCHC RBC-ENTMCNC: 34.5 GM/DL — SIGNIFICANT CHANGE UP (ref 32–36)
MCV RBC AUTO: 82.3 FL — SIGNIFICANT CHANGE UP (ref 80–100)
PHOSPHATE SERPL-MCNC: 1.7 MG/DL — LOW (ref 2.5–4.5)
PLATELET # BLD AUTO: 171 K/UL — SIGNIFICANT CHANGE UP (ref 150–400)
POTASSIUM SERPL-MCNC: 3.7 MMOL/L — SIGNIFICANT CHANGE UP (ref 3.5–5.3)
POTASSIUM SERPL-SCNC: 3.7 MMOL/L — SIGNIFICANT CHANGE UP (ref 3.5–5.3)
RBC # BLD: 4.34 M/UL — SIGNIFICANT CHANGE UP (ref 4.2–5.8)
RBC # FLD: 15 % — HIGH (ref 10.3–14.5)
SODIUM SERPL-SCNC: 140 MMOL/L — SIGNIFICANT CHANGE UP (ref 135–145)
WBC # BLD: 11.14 K/UL — HIGH (ref 3.8–10.5)
WBC # FLD AUTO: 11.14 K/UL — HIGH (ref 3.8–10.5)

## 2023-05-18 PROCEDURE — 99232 SBSQ HOSP IP/OBS MODERATE 35: CPT

## 2023-05-18 RX ORDER — POTASSIUM PHOSPHATE, MONOBASIC POTASSIUM PHOSPHATE, DIBASIC 236; 224 MG/ML; MG/ML
15 INJECTION, SOLUTION INTRAVENOUS ONCE
Refills: 0 | Status: COMPLETED | OUTPATIENT
Start: 2023-05-18 | End: 2023-05-18

## 2023-05-18 RX ADMIN — OLANZAPINE 5 MILLIGRAM(S): 15 TABLET, FILM COATED ORAL at 10:05

## 2023-05-18 RX ADMIN — PIPERACILLIN AND TAZOBACTAM 25 GRAM(S): 4; .5 INJECTION, POWDER, LYOPHILIZED, FOR SOLUTION INTRAVENOUS at 13:18

## 2023-05-18 RX ADMIN — PANTOPRAZOLE SODIUM 40 MILLIGRAM(S): 20 TABLET, DELAYED RELEASE ORAL at 08:24

## 2023-05-18 RX ADMIN — HEPARIN SODIUM 1100 UNIT(S)/HR: 5000 INJECTION INTRAVENOUS; SUBCUTANEOUS at 08:21

## 2023-05-18 RX ADMIN — HEPARIN SODIUM 1100 UNIT(S)/HR: 5000 INJECTION INTRAVENOUS; SUBCUTANEOUS at 13:18

## 2023-05-18 RX ADMIN — Medication 12.5 MILLIGRAM(S): at 21:18

## 2023-05-18 RX ADMIN — PIPERACILLIN AND TAZOBACTAM 25 GRAM(S): 4; .5 INJECTION, POWDER, LYOPHILIZED, FOR SOLUTION INTRAVENOUS at 05:24

## 2023-05-18 RX ADMIN — Medication 12.5 MILLIGRAM(S): at 10:04

## 2023-05-18 RX ADMIN — ATORVASTATIN CALCIUM 10 MILLIGRAM(S): 80 TABLET, FILM COATED ORAL at 21:18

## 2023-05-18 RX ADMIN — POTASSIUM PHOSPHATE, MONOBASIC POTASSIUM PHOSPHATE, DIBASIC 62.5 MILLIMOLE(S): 236; 224 INJECTION, SOLUTION INTRAVENOUS at 20:31

## 2023-05-18 NOTE — PROVIDER CONTACT NOTE (CRITICAL VALUE NOTIFICATION) - NAME OF MD/NP/PA/DO NOTIFIED:
"noted  ----- Message -----  From: TUSHAR MatthewsNSRI.  Sent: 3/13/2023   6:32 PM PDT  To: Rachel Bird R.N.      FYI    Patient is experiencing some water issues and does not want HHA at this time. She would rather take my number and call me when they have running water again for the shower. Explained to pt Wife we have bath wipes we can put in the microwave but she said \"he is fine\" Ill text you. "
Dr. Velasquez
MD Hale
Dr Atwood

## 2023-05-18 NOTE — PROGRESS NOTE ADULT - SUBJECTIVE AND OBJECTIVE BOX
72y old  Male who presents with a chief complaint of CC: Septic Shock (17 May 2023 11:32)    24 hour events: off levo since 12 pm on  pt awake, denies abd pain, no sob, no CP  Allergies    No Known Allergies    Intolerances      REVIEW OF SYSTEMS:no sob, no abd pain, no vomiting      PHYSICAL EXAM:    Daily     Daily Weight in k.8 (18 May 2023 05:00)    ICU Vital Signs Last 24 Hrs  T(C): 36.5 (18 May 2023 15:28), Max: 37.2 (18 May 2023 00:00)  T(F): 97.7 (18 May 2023 15:28), Max: 99 (18 May 2023 00:00)  HR: 76 (18 May 2023 15:00) (76 - 101)  BP: 129/75 (18 May 2023 15:00) (114/62 - 129/75)  BP(mean): 88 (18 May 2023 15:00) (67 - 105)  ABP: --  ABP(mean): --  RR: 18 (17 May 2023 22:00) (18 - 18)  SpO2: 94% (17 May 2023 22:00) (94% - 94%)        Constitutional: nad  HEENT: Atraumatic, LEORA, Normal, No congestion  Respiratory: Breath Sounds normal, no rhonchi/wheeze  Cardiovascular: N S1S2;   Gastrointestinal: Abdomen soft, non tender, mildly distended, soft   Extremities: No edema, peripheral pulses present  Neurological: awake, follows1 step commands  Skin: Non cellulitic, no rash, ulcers                            12.3   11.14 )-----------( 171      ( 18 May 2023 05:23 )             35.7       CBC Full  -  ( 18 May 2023 05:23 )  WBC Count : 11.14 K/uL  RBC Count : 4.34 M/uL  Hemoglobin : 12.3 g/dL  Hematocrit : 35.7 %  Platelet Count - Automated : 171 K/uL  Mean Cell Volume : 82.3 fl  Mean Cell Hemoglobin : 28.3 pg  Mean Cell Hemoglobin Concentration : 34.5 gm/dL  Auto Neutrophil # : x  Auto Lymphocyte # : x  Auto Monocyte # : x  Auto Eosinophil # : x  Auto Basophil # : x  Auto Neutrophil % : x  Auto Lymphocyte % : x  Auto Monocyte % : x  Auto Eosinophil % : x  Auto Basophil % : x      05-18    140  |  113<H>  |  13  ----------------------------<  93  3.7   |  20<L>  |  0.87    Ca    8.8      18 May 2023 05:23  Phos  1.7       Mg     2.1         TPro  5.9<L>  /  Alb  2.3<L>  /  TBili  2.5<H>  /  DBili  x   /  AST  88<H>  /  ALT  120<H>  /  AlkPhos  207<H>        LIVER FUNCTIONS - ( 17 May 2023 05:34 )  Alb: 2.3 g/dL / Pro: 5.9 gm/dL / ALK PHOS: 207 U/L / ALT: 120 U/L / AST: 88 U/L / GGT: x             PTT - ( 18 May 2023 05:23 )  PTT:74.2 sec                  MEDICATIONS  (STANDING):  atorvastatin 10 milliGRAM(s) Oral at bedtime  heparin  Infusion.  Unit(s)/Hr (11 mL/Hr) IV Continuous <Continuous>  metoprolol tartrate 12.5 milliGRAM(s) Oral every 12 hours  OLANZapine 5 milliGRAM(s) Oral daily  pantoprazole    Tablet 40 milliGRAM(s) Oral before breakfast  piperacillin/tazobactam IVPB.. 3.375 Gram(s) IV Intermittent every 8 hours

## 2023-05-18 NOTE — PROGRESS NOTE ADULT - ASSESSMENT
73 y/o male with PMH HTN, HLD, gastritis,     Admitted for septic shock from acute cholangitis from an obstructing CBD stone   Klebsiella bacteremia   PV thrombosis     S/p ERCP with biliary stent placement on 5/15     Now clinically stable       Plan:     Continue Zosyn   Consult ID   Repeat BCx NGTD   No Abernathy, lines  Continue iv heparin if no procedure planned switch to doac  Prerenal ALYSSA - improving, d/c IVF   Replete lytes   Surgery, GI, hematology f/u   TTE with diastolic dysfn, pulm HTN, will resume low dose BB   OOB

## 2023-05-18 NOTE — PROGRESS NOTE ADULT - ASSESSMENT
71 y/o M presented to the ED with ongoing abdominal pain with associated NV & fever/chills found to have hypotension & leukocytosis with concern for acute husam on imaging.    # R Intrahepatic Portal Vein Thrombosis in setting of Sepsis    - CT imaging revealed distended gallbladder with gallbladder wall edema. ? cholecystitis. Right intrahepatic portal vein thrombosis. Partial distention versus colitis of the ascending/transverse colon. Bilateral perinephric stranding without hydroureteronephrosis. Question striated bilateral nephrogram. Recommend clinical correlation to assess urinary tract infection.  - Started on Heparin gtt ---> continue at this time  - Likely 2/2 acute infectious / inflammatory process; hypercoagulable workup should be deferred at this time as will likely generate false positive results   - Abdominal US-The visualized main portal vein appears patent with normal direction of flow. The previously seen thrombus in the peripheral branches of the right portal vein are not assessed on the current exam  - GI-->MRCP -Portal vein thrombosis on same-day CT is not visualized on noncontrast MRI.Restricted diffusion throughout the liver parenchyma compatible with acute hepatitis. Diffuse nonspecific gallbladder wall edema without stones is likely reactive to hepatitis.2 mm distal choledocholithiasis causing 7 mm CBD dilatation.  - S/P ERCP-5/15--->sphincterotomy, stone removal, and stent placement.  - Surgery following; plan ---> anticipated cholecystectomy vs husam tube by IR.  - Continue management and supportive measures per CCU and Surgical teams   - Anticoagulation on IV heparin for now- to be changed to DOAC later x 3-6 months- dose to be determine (if tolerates) when stable and no surgical intervention needed.  73 y/o M presented to the ED with ongoing abdominal pain with associated NV & fever/chills found to have hypotension & leukocytosis with concern for acute husam on imaging.    # R Intrahepatic Portal Vein Thrombosis in setting of Sepsis    - CT imaging revealed distended gallbladder with gallbladder wall edema. ? cholecystitis. Right intrahepatic portal vein thrombosis. Partial distention versus colitis of the ascending/transverse colon. Bilateral perinephric stranding without hydroureteronephrosis. Question striated bilateral nephrogram. Recommend clinical correlation to assess urinary tract infection.  - Started on Heparin gtt ---> continue at this time  - Likely 2/2 acute infectious / inflammatory process; hypercoagulable workup should be deferred at this time as will likely generate false positive results   - Abdominal US-The visualized main portal vein appears patent with normal direction of flow. The previously seen thrombus in the peripheral branches of the right portal vein are not assessed on the current exam  - GI-->MRCP -Portal vein thrombosis on same-day CT is not visualized on noncontrast MRI.Restricted diffusion throughout the liver parenchyma compatible with acute hepatitis. Diffuse nonspecific gallbladder wall edema without stones is likely reactive to hepatitis.2 mm distal choledocholithiasis causing 7 mm CBD dilatation.  - S/P ERCP-5/15--->sphincterotomy, stone removal, and stent placement.  - Surgery following; plan ---> anticipated cholecystectomy vs husam tube by IR.  - Continue management and supportive measures per CCU and Surgical teams   - Anticoagulation on IV heparin for now- to be changed to DOAC when stable and no surgical intervention needed. Plan for 3-6 months of AC.    Question  if loading dose of DOAC needed when transitioning from iv heparin to DOAC.  Reviewed literature. No clear guidelines but suggestion to shorten / omit loading period in selected patients . Loading dose of DOAC is  given when patient is started on DOAC for treatment of acute VTE to jump start AC when clot burden is highest. Per literature- it is reasonable to shorten loading period of DOAC if patient has been already receiving  UFH for treatment of acute clot for few days . His clot burden is small and he has been on AC for three days days already and continues on heparin. If transitioned to DOAc in next few days- recommend to give Eliquis 5 mg BID without loading dose.

## 2023-05-18 NOTE — PROGRESS NOTE ADULT - ASSESSMENT
71 y/o male h/o HTN, gastritis, psych history was admitted on 5/15 for abdominal pain x 2 days associated vomiting, fever, chills. In ER he was noted hypotensive to 70's systolic. He was given 3 L of IV fluids in ED without improvement in BP. ICU consulted for hypotension refractory IV crystalloid. She received zosyn. He was reported with bacteremia. He underwent MRCP that showed 2mm filling defect with CBD dilation.       1. Sepsis with KLPN improving. Probable acute cholecystitis and acute cholangitis with filling defect s/p prior biliary stent, s/p ERCP with sphincterotomy and stent placement  -hypotension improving  -cultures reviewed  -on zosyn 3.375 gm IV q8h # 2  -tolerating abx well so far; no side effects noted  -surgical evaluation appreciated  -cholecystostomy tube for cholecystitis is considered  -hydration   -monitor abdomen  -old chart reviewed to assess prior cultures  -monitor temps  -f/u CBC  -supportive care  2. Other issues:   -care per medicine    d/w critical care team

## 2023-05-18 NOTE — PROGRESS NOTE ADULT - SUBJECTIVE AND OBJECTIVE BOX
Date of service: 05-18-23 @ 12:24    Lying in bed in NAD  Weak looking  Has low grade fever  Has RUQ discomfort    ROS: denies dizziness, no HA, no SOB or cough, no diarrhea or constipation; no dysuria, no legs pain, no rashes    MEDICATIONS  (STANDING):  atorvastatin 10 milliGRAM(s) Oral at bedtime  heparin  Infusion.  Unit(s)/Hr (11 mL/Hr) IV Continuous <Continuous>  metoprolol tartrate 12.5 milliGRAM(s) Oral every 12 hours  OLANZapine 5 milliGRAM(s) Oral daily  pantoprazole    Tablet 40 milliGRAM(s) Oral before breakfast  piperacillin/tazobactam IVPB.. 3.375 Gram(s) IV Intermittent every 8 hours    Vital Signs Last 24 Hrs  T(C): 37.2 (18 May 2023 05:00), Max: 37.2 (18 May 2023 00:00)  T(F): 99 (18 May 2023 05:00), Max: 99 (18 May 2023 00:00)  HR: 82 (18 May 2023 11:00) (77 - 101)  BP: 124/70 (18 May 2023 11:00) (114/62 - 136/79)  BP(mean): 82 (18 May 2023 11:00) (67 - 105)  RR: 18 (17 May 2023 22:00) (18 - 18)  SpO2: 94% (17 May 2023 22:00) (94% - 94%)     Physical exam:    Constitutional:  No acute distress  HEENT: NC/AT, EOMI, PERRLA, conjunctivae clear; ears and nose atraumatic; pharynx benign  Neck: supple; thyroid not palpable  Back: no tenderness  Respiratory: respiratory effort normal; clear to auscultation  Cardiovascular: S1S2 regular, no murmurs  Abdomen: soft, mid abdomen tender, not distended, positive BS; no liver or spleen organomegaly  Genitourinary: no suprapubic tenderness  Lymphatic: no LN palpable  Musculoskeletal: no muscle tenderness, no joint swelling or tenderness  Extremities: no pedal edema  Neurological/ Psychiatric: AxOx3, judgement and insight normal; moving all extremities  Skin: no rashes; no palpable lesions    Labs: reviewed                        12.3   11.14 )-----------( 171      ( 18 May 2023 05:23 )             35.7     05-18    140  |  113<H>  |  13  ----------------------------<  93  3.7   |  20<L>  |  0.87    Ca    8.8      18 May 2023 05:23  Phos  1.7     05-18  Mg     2.1     05-18    TPro  5.9<L>  /  Alb  2.3<L>  /  TBili  2.5<H>  /  DBili  x   /  AST  88<H>  /  ALT  120<H>  /  AlkPhos  207<H>  05-17                        10.9   10.90 )-----------( 138      ( 17 May 2023 05:34 )             32.0     05-17    143  |  116<H>  |  16  ----------------------------<  99  3.2<L>   |  20<L>  |  0.95    Ca    8.0<L>      17 May 2023 05:34  Phos  1.8     05-17  Mg     1.7     05-17    TPro  5.9<L>  /  Alb  2.3<L>  /  TBili  2.5<H>  /  DBili  x   /  AST  88<H>  /  ALT  120<H>  /  AlkPhos  207<H>  05-17     LIVER FUNCTIONS - ( 17 May 2023 05:34 )  Alb: 2.3 g/dL / Pro: 5.9 gm/dL / ALK PHOS: 207 U/L / ALT: 120 U/L / AST: 88 U/L / GGT: x           (05-14 @ 23:53)  [facility]      Culture - Blood (collected 16 May 2023 05:29)  Source: .Blood None  Preliminary Report (17 May 2023 09:02):    No growth to date.    Culture - Blood (collected 16 May 2023 05:26)  Source: .Blood None  Preliminary Report (17 May 2023 09:02):    No growth to date.    Culture - Urine (collected 15 May 2023 04:14)  Source: Clean Catch Clean Catch (Midstream)  Final Report (16 May 2023 07:11):    No growth    Culture - Blood (collected 15 May 2023 01:37)  Source: .Blood Blood-Peripheral  Gram Stain (16 May 2023 03:59):    Growth in aerobic bottle: Gram Negative Rods    Growth in anaerobic bottle: Gram Negative Rods  Final Report (17 May 2023 06:51):    Growth in aerobic and anaerobic bottles: Klebsiella pneumoniae  Organism: Blood Culture PCR  Klebsiella pneumoniae (17 May 2023 06:51)  Organism: Klebsiella pneumoniae (17 May 2023 06:51)      Method Type: KATYA      -  Amikacin: S <=16      -  Ampicillin: R >16 These ampicillin results predict results for amoxicillin      -  Ampicillin/Sulbactam: S <=4/2 Enterobacter, Klebsiella aerogenes, Citrobacter, and Serratia may develop resistance during prolonged therapy (3-4 days)      -  Aztreonam: S <=4      -  Cefazolin: S <=2 Enterobacter, Klebsiella aerogenes, Citrobacter, and Serratia may develop resistance during prolonged therapy (3-4 days)      -  Cefepime: S <=2      -  Cefoxitin: S <=8      -  Ceftriaxone: S <=1 Enterobacter, Klebsiella aerogenes, Citrobacter, and Serratia may develop resistance during prolonged therapy      -  Ciprofloxacin: S <=0.25      -  Ertapenem: S <=0.5      -  Gentamicin: S <=2      -  Imipenem: S <=1      -  Levofloxacin: S <=0.5      -  Meropenem: S <=1      -  Piperacillin/Tazobactam: S <=8      -  Tobramycin: S <=2      -  Trimethoprim/Sulfamethoxazole: S <=0.5/9.5  Organism: Blood Culture PCR (17 May 2023 06:51)      Method Type: PCR      -  K. pneumoniae group: Detec (K. pneumoniae, K. quasipneumoniae, K. variicola)    Culture - Blood (collected 14 May 2023 23:53)  Source: .Blood Blood-Peripheral  Gram Stain (16 May 2023 07:20):    Growth in anaerobic bottle: Gram Negative Rods    Growth in aerobic bottle: Gram Negative Rods  Final Report (17 May 2023 06:52):    Growth in aerobic and anaerobic bottles: Klebsiella pneumoniae    See previous culture [id number]    Radiology: all available radiological tests reviewed    < from: US Abdomen Upper Quadrant Right (05.16.23 @ 12:30) >  Common bile duct stent noted within the gallbladder.  Gallbladder wall thickening.  < end of copied text >    < from: CT Abdomen and Pelvis w/ IV Cont (05.15.23 @ 01:16) >  Distended gallbladder with gallbladder wall edema. ? cholecystitis.   Recommend correlation and additional imaging.  Right intrahepatic portal vein thrombosis.  Partial distention versus colitis of the ascending/transverse colon.   Bilateral perinephric stranding without hydroureteronephrosis. Question   striated bilateral nephrogram. Recommend clinical correlation to assess urinary tract infection.  < end of copied text >      Advanced directives addressed: full resuscitation

## 2023-05-18 NOTE — PROGRESS NOTE ADULT - SUBJECTIVE AND OBJECTIVE BOX
HPI:    71 y/o M with a pmhx HTN, gastritis, psych history presented to ED with x 2 days of abdominal pain. Associated vomiting, fever, chills. Hypotensive on admission to 70's systolic. Lab work significant for WBC 15.64 with L shift,, 19 % bandemia, Scr 2.55 ( baseline Scr 1.23 10/22), lactate  4.4, total bilirubin 3.2, alk phos 122, , . CT abd/pelvis significant for cholecystitis with gall bladder wall thickening, as well as R intrahepatic portal thrombosis.  Was given 3 L of IV fluids in ED without improvement in BP. ICU consulted for hypotension refractory IV crystalloid.  (15 May 2023 05:37)    05/15/2023: Seen at bedside, no acute distress    2023- Seen at bedside, alert, awake, but remains confused per staff and is in posey restraint due to attempting to climb OOB.    PAST MEDICAL & SURGICAL HISTORY:    Alcohol abuse    Drug abuse    Schizophrenia    Dementia    MEDICATIONS  (STANDING):    atorvastatin 10 milliGRAM(s) Oral at bedtime  heparin  Infusion.  Unit(s)/Hr (11 mL/Hr) IV Continuous <Continuous>  metoprolol tartrate 12.5 milliGRAM(s) Oral every 12 hours  OLANZapine 5 milliGRAM(s) Oral daily  pantoprazole    Tablet 40 milliGRAM(s) Oral before breakfast  piperacillin/tazobactam IVPB.. 3.375 Gram(s) IV Intermittent every 8 hours    MEDICATIONS  (PRN):    acetaminophen     Tablet .. 650 milliGRAM(s) Oral every 6 hours PRN Temp greater or equal to 38C (100.4F), Mild Pain (1 - 3)  aluminum hydroxide/magnesium hydroxide/simethicone Suspension 30 milliLiter(s) Oral every 4 hours PRN Dyspepsia  ondansetron Injectable 4 milliGRAM(s) IV Push every 8 hours PRN Nausea and/or Vomiting    ALLERGIES:    No Known Allergies      REVIEW OF SYSTEMS:    unable to obtain due to confused mental status.      VITALS:    Vital Signs Last 24 Hrs  T(C): 36.5 (18 May 2023 15:28), Max: 37.2 (18 May 2023 00:00)  T(F): 97.7 (18 May 2023 15:28), Max: 99 (18 May 2023 00:00)  HR: 76 (18 May 2023 15:00) (76 - 101)  BP: 129/75 (18 May 2023 15:00) (114/62 - 133/74)  BP(mean): 88 (18 May 2023 15:00) (67 - 105)  RR: 18 (17 May 2023 22:00) (18 - 18)  SpO2: 94% (17 May 2023 22:00) (94% - 94%)      PHYSICAL:    Constitutional: no acute distress  Eyes: PERRL, EOMI  ENT: pharynx is unremarkable  Neck: supple without JVD  Pulmonary: clear to auscultation bilaterally, no dullness, no wheezing  Cardiac: RRR, normal S1S2  Vascular: no calf tenderness, venous stasis changes, varices   Abdomen: normoactive bowel sounds, soft and nontender, no hepatosplenomegaly or masses appreciated   Lymphatic: no peripheral adenopathy appreciated  Musculoskeletal: full range of motion and no deformities appreciated  Skin: normal appearance, no rash/erythema  Neurology: grossly intact  Psychiatric: affect appropriate      LABS:                          12.3   11.14 )-----------( 171      ( 18 May 2023 05:23 )             35.7         Urinalysis Basic - ( 15 May 2023 04:14 )    Color: Yellow / Appearance: Clear / S.005 / pH: x  Gluc: x / Ketone: Trace  / Bili: Moderate / Urobili: 4   Blood: x / Protein: 15 / Nitrite: Negative   Leuk Esterase: Trace / RBC: 3-5 /HPF / WBC 11-25 /HPF   Sq Epi: x / Non Sq Epi: x / Bacteria: Occasional        RADIOLOGY & ADDITIONAL STUDIES:      CT Abdomen and Pelvis w/ IV Cont (05.15.23 @ 01:16)    FINDINGS:    LOWER CHEST: Atelectasis. Aortic root calcification.    LIVER: No obvious lesion.  BILE DUCTS/GALLBLADDER: No obvious intraparenchymal dilatation. Stable   common bile duct (0.7 cm). Distended gallbladder with gallbladder wall   edema.  PANCREAS: Stable duct.  SPLEEN: Unremarkable.    ADRENALS: Unremarkable.  KIDNEYS/URETERS: Bilateral perinephric stranding without   hydroureteronephrosis. Question striated bilateral nephrogram. Stable   indeterminate right renal lesion and hypo densities.  BLADDER: Partially distended.  REPRODUCTIVE ORGANS: Normal size of the prostate.    BOWEL: No bowel obstruction. Unremarkable appendix. Sigmoid colon   anastomosis. Colon diverticulosis. Wall thickening of the cecum,   ascending and transverse colon with slight stranding.  PERITONEUM: No drainable fluid collection or free air.  VESSELS: Atherosclerosis. Normal caliber of the abdominal aorta.   Hypo densities at the anterior branches of the right intrahepatic portal   vein, likely thrombus. Patent portal vein, hepatic veins, SMV and splenic   vein.  RETROPERITONEUM: Subcentimeter lymph nodes without lymphadenopathy.  ABDOMINAL WALL/SOFT TISSUES: Small fat-containing umbilical hernia.  BONES: Degenerative changes of the spine. Nonspecific small sclerotic   foci at the pelvic bones.    IMPRESSION:    Distended gallbladder with gallbladder wall edema. ? cholecystitis.   Recommend correlation and additional imaging.    Right intrahepatic portal vein thrombosis.    Partial distention versus colitis of the ascending/transverse colon.   Recommend clinical correlation.    Bilateral perinephric stranding without hydroureteronephrosis. Question   striated bilateral nephrogram. Recommend clinical correlation to assess   urinary tract infection.    EXAM:  MR MRCP   ORDERED BY: DANIEL WILSON     PROCEDURE DATE:  05/15/2023      INTERPRETATION:  CLINICAL INFORMATION: Hepatitis with generalized   abdominal pain and elevated LFTs, sepsis    COMPARISON: Same day ultrasound and CT    CONTRAST/COMPLICATIONS:  IV Contrast: NONE  Oral Contrast: NONE  Complications: None reported at time of study completion    PROCEDURE:  MRI/MRCP was performed. Radial and 3D MRCP sequences were obtained.    FINDINGS:  LOWER CHEST: Trace bilateral effusions.    LIVER: Normal size and morphology. Diffusely restricted diffusion   compatible with hepatitis.  BILE DUCTS: 2 mm stone in the distal CBD with 7 mm CBD dilatation. No   intrahepatic dilatation.  GALLBLADDER: Gallbladder is  mildly distended without stones. Diffuse   nonspecific wall edema.  SPLEEN: Normal.  PANCREAS: Normal.  ADRENALS: Normal.  KIDNEYS/URETERS: No hydronephrosis.    VISUALIZED PORTIONS:  BOWEL: No bowel dilatation. Normal appendix.  PERITONEUM: Trace pelvic free fluid.  VESSELS: Aortic atherosclerosis without aneurysm.  RETROPERITONEUM/LYMPH NODES: No adenopathy.  ABDOMINAL WALL: Normal.  BONES: No aggressive lesion.    IMPRESSION:  *  Portal vein thrombosis on same-day CT is not visualized on noncontrast   MRI.  * Restricted diffusion throughout the liver parenchyma compatible with   acute hepatitis.  *  Diffuse nonspecific gallbladder wall edema without stones is likely   reactive to hepatitis.  *  2 mm distal choledocholithiasis causing 7 mm CBD dilatation.      EXAM:  US ABDOMEN RT UPR QUADRANT   ORDERED BY: DANIEL WILSON     PROCEDURE DATE:  2023    INTERPRETATION:  CLINICAL INFORMATION: Acute cholecystitis. ERCP   performed yesterday, 5/15/2023.    COMPARISON:  Abdominal ultrasound, MRI of the abdomen and CT of the   abdomen and pelvis from 5/15/2023..    TECHNIQUE: Sonography of the right upper quadrant.    FINDINGS:  Liver: Within normal limits in echogenicity and size, measuring up to   12.3 cm. The visualized main portal vein appears patent with normal   direction of flow. The previously seen thrombus in the peripheral   branches of the right portal vein are not assessed on the current exam.  Bile ducts:  A common bile duct stent is noted within the gallbladder.   Common bile duct measures 6 mm.  Gallbladder: The gallbladder wall is thickened, measuring up to 6 mm. No   gallstones are seen within the gallbladder.  Pancreas: Visualized portions are within normal limits.  Right kidney: 8.6 cm. No hydronephrosis.  Ascites: None.  Aorta/IVC: Visualized portions are within normal limits.    IMPRESSION:  Common bile duct stent noted within the gallbladder.    Gallbladder wall thickening.    The visualized main portal vein appears patent with normal direction of   flow. The previously seen thrombus in the peripheral branches of the   right portal vein are not assessed on the current exam.

## 2023-05-19 LAB
ALBUMIN SERPL ELPH-MCNC: 2.3 G/DL — LOW (ref 3.3–5)
ALP SERPL-CCNC: 314 U/L — HIGH (ref 40–120)
ALT FLD-CCNC: 71 U/L — SIGNIFICANT CHANGE UP (ref 12–78)
ANION GAP SERPL CALC-SCNC: 7 MMOL/L — SIGNIFICANT CHANGE UP (ref 5–17)
APTT BLD: 60.1 SEC — HIGH (ref 27.5–35.5)
AST SERPL-CCNC: 50 U/L — HIGH (ref 15–37)
BASOPHILS # BLD AUTO: 0.09 K/UL — SIGNIFICANT CHANGE UP (ref 0–0.2)
BASOPHILS NFR BLD AUTO: 1 % — SIGNIFICANT CHANGE UP (ref 0–2)
BILIRUB SERPL-MCNC: 1.7 MG/DL — HIGH (ref 0.2–1.2)
BUN SERPL-MCNC: 17 MG/DL — SIGNIFICANT CHANGE UP (ref 7–23)
CALCIUM SERPL-MCNC: 8.3 MG/DL — LOW (ref 8.5–10.1)
CHLORIDE SERPL-SCNC: 114 MMOL/L — HIGH (ref 96–108)
CO2 SERPL-SCNC: 21 MMOL/L — LOW (ref 22–31)
CREAT SERPL-MCNC: 1.09 MG/DL — SIGNIFICANT CHANGE UP (ref 0.5–1.3)
CULTURE RESULTS: SIGNIFICANT CHANGE UP
EGFR: 72 ML/MIN/1.73M2 — SIGNIFICANT CHANGE UP
EOSINOPHIL # BLD AUTO: 0.34 K/UL — SIGNIFICANT CHANGE UP (ref 0–0.5)
EOSINOPHIL NFR BLD AUTO: 4 % — SIGNIFICANT CHANGE UP (ref 0–6)
GLUCOSE SERPL-MCNC: 102 MG/DL — HIGH (ref 70–99)
HCT VFR BLD CALC: 31.7 % — LOW (ref 39–50)
HGB BLD-MCNC: 10.7 G/DL — LOW (ref 13–17)
LYMPHOCYTES # BLD AUTO: 1.63 K/UL — SIGNIFICANT CHANGE UP (ref 1–3.3)
LYMPHOCYTES # BLD AUTO: 19 % — SIGNIFICANT CHANGE UP (ref 13–44)
MANUAL SMEAR VERIFICATION: SIGNIFICANT CHANGE UP
MCHC RBC-ENTMCNC: 28 PG — SIGNIFICANT CHANGE UP (ref 27–34)
MCHC RBC-ENTMCNC: 33.8 GM/DL — SIGNIFICANT CHANGE UP (ref 32–36)
MCV RBC AUTO: 83 FL — SIGNIFICANT CHANGE UP (ref 80–100)
METAMYELOCYTES # FLD: 1 % — HIGH (ref 0–0)
MONOCYTES # BLD AUTO: 0.6 K/UL — SIGNIFICANT CHANGE UP (ref 0–0.9)
MONOCYTES NFR BLD AUTO: 7 % — SIGNIFICANT CHANGE UP (ref 2–14)
NEUTROPHILS # BLD AUTO: 5.84 K/UL — SIGNIFICANT CHANGE UP (ref 1.8–7.4)
NEUTROPHILS NFR BLD AUTO: 68 % — SIGNIFICANT CHANGE UP (ref 43–77)
NRBC # BLD: 0 /100 — SIGNIFICANT CHANGE UP (ref 0–0)
NRBC # BLD: SIGNIFICANT CHANGE UP /100 WBCS (ref 0–0)
PLAT MORPH BLD: NORMAL — SIGNIFICANT CHANGE UP
PLATELET # BLD AUTO: 196 K/UL — SIGNIFICANT CHANGE UP (ref 150–400)
POTASSIUM SERPL-MCNC: 3.6 MMOL/L — SIGNIFICANT CHANGE UP (ref 3.5–5.3)
POTASSIUM SERPL-SCNC: 3.6 MMOL/L — SIGNIFICANT CHANGE UP (ref 3.5–5.3)
PROT SERPL-MCNC: 6.5 GM/DL — SIGNIFICANT CHANGE UP (ref 6–8.3)
RBC # BLD: 3.82 M/UL — LOW (ref 4.2–5.8)
RBC # FLD: 15.3 % — HIGH (ref 10.3–14.5)
RBC BLD AUTO: NORMAL — SIGNIFICANT CHANGE UP
SODIUM SERPL-SCNC: 142 MMOL/L — SIGNIFICANT CHANGE UP (ref 135–145)
SPECIMEN SOURCE: SIGNIFICANT CHANGE UP
WBC # BLD: 8.59 K/UL — SIGNIFICANT CHANGE UP (ref 3.8–10.5)
WBC # FLD AUTO: 8.59 K/UL — SIGNIFICANT CHANGE UP (ref 3.8–10.5)

## 2023-05-19 PROCEDURE — 99232 SBSQ HOSP IP/OBS MODERATE 35: CPT

## 2023-05-19 RX ADMIN — PIPERACILLIN AND TAZOBACTAM 25 GRAM(S): 4; .5 INJECTION, POWDER, LYOPHILIZED, FOR SOLUTION INTRAVENOUS at 17:49

## 2023-05-19 RX ADMIN — HEPARIN SODIUM 1100 UNIT(S)/HR: 5000 INJECTION INTRAVENOUS; SUBCUTANEOUS at 19:34

## 2023-05-19 RX ADMIN — Medication 650 MILLIGRAM(S): at 22:30

## 2023-05-19 RX ADMIN — OLANZAPINE 5 MILLIGRAM(S): 15 TABLET, FILM COATED ORAL at 10:01

## 2023-05-19 RX ADMIN — PANTOPRAZOLE SODIUM 40 MILLIGRAM(S): 20 TABLET, DELAYED RELEASE ORAL at 05:50

## 2023-05-19 RX ADMIN — HEPARIN SODIUM 1100 UNIT(S)/HR: 5000 INJECTION INTRAVENOUS; SUBCUTANEOUS at 15:19

## 2023-05-19 RX ADMIN — ATORVASTATIN CALCIUM 10 MILLIGRAM(S): 80 TABLET, FILM COATED ORAL at 21:41

## 2023-05-19 RX ADMIN — PIPERACILLIN AND TAZOBACTAM 25 GRAM(S): 4; .5 INJECTION, POWDER, LYOPHILIZED, FOR SOLUTION INTRAVENOUS at 02:30

## 2023-05-19 RX ADMIN — Medication 12.5 MILLIGRAM(S): at 10:01

## 2023-05-19 RX ADMIN — HEPARIN SODIUM 1100 UNIT(S)/HR: 5000 INJECTION INTRAVENOUS; SUBCUTANEOUS at 07:24

## 2023-05-19 RX ADMIN — HEPARIN SODIUM 1100 UNIT(S)/HR: 5000 INJECTION INTRAVENOUS; SUBCUTANEOUS at 08:42

## 2023-05-19 RX ADMIN — PIPERACILLIN AND TAZOBACTAM 25 GRAM(S): 4; .5 INJECTION, POWDER, LYOPHILIZED, FOR SOLUTION INTRAVENOUS at 10:01

## 2023-05-19 RX ADMIN — Medication 12.5 MILLIGRAM(S): at 21:41

## 2023-05-19 RX ADMIN — Medication 650 MILLIGRAM(S): at 21:43

## 2023-05-19 NOTE — PROGRESS NOTE ADULT - SUBJECTIVE AND OBJECTIVE BOX
Date of service: 05-19-23 @ 09:50    Lying in bed in NAD  Has low grade fever  Has RUQ tenderness    ROS: no fever or chills; denies dizziness, no HA, no SOB or cough, no diarrhea or constipation; no dysuria, no legs pain, no rashes    MEDICATIONS  (STANDING):  atorvastatin 10 milliGRAM(s) Oral at bedtime  heparin  Infusion.  Unit(s)/Hr (11 mL/Hr) IV Continuous <Continuous>  metoprolol tartrate 12.5 milliGRAM(s) Oral every 12 hours  OLANZapine 5 milliGRAM(s) Oral daily  pantoprazole    Tablet 40 milliGRAM(s) Oral before breakfast  piperacillin/tazobactam IVPB.. 3.375 Gram(s) IV Intermittent every 8 hours    Vital Signs Last 24 Hrs  T(C): 36.4 (19 May 2023 08:03), Max: 37.6 (19 May 2023 00:07)  T(F): 97.5 (19 May 2023 08:03), Max: 99.7 (19 May 2023 00:07)  HR: 65 (19 May 2023 08:03) (65 - 103)  BP: 128/77 (19 May 2023 08:03) (124/70 - 147/82)  BP(mean): 83 (18 May 2023 18:00) (82 - 88)  RR: 18 (19 May 2023 08:03) (18 - 18)  SpO2: 99% (19 May 2023 08:03) (97% - 99%)    Parameters below as of 19 May 2023 08:03  Patient On (Oxygen Delivery Method): room air     Physical exam:    Constitutional:  No acute distress  HEENT: NC/AT, EOMI, PERRLA, conjunctivae clear; ears and nose atraumatic;   Neck: supple; thyroid not palpable  Back: no tenderness  Respiratory: respiratory effort normal; clear to auscultation  Cardiovascular: S1S2 regular, no murmurs  Abdomen: soft, mid abdomen tender, not distended, positive BS  Genitourinary: no suprapubic tenderness  Lymphatic: no LN palpable  Musculoskeletal: no muscle tenderness, no joint swelling or tenderness  Extremities: no pedal edema  Neurological/ Psychiatric: AxOx3, judgement and insight normal; moving all extremities  Skin: no rashes; no palpable lesions    Labs: reviewed                        10.7   8.59  )-----------( 196      ( 19 May 2023 07:58 )             31.7     05-19    142  |  114<H>  |  17  ----------------------------<  102<H>  3.6   |  21<L>  |  1.09    Ca    8.3<L>      19 May 2023 07:58  Phos  1.7     05-18  Mg     2.1     05-18    TPro  6.5  /  Alb  2.3<L>  /  TBili  1.7<H>  /  DBili  x   /  AST  50<H>  /  ALT  71  /  AlkPhos  314<H>  05-19                        12.3   11.14 )-----------( 171      ( 18 May 2023 05:23 )             35.7     05-18    140  |  113<H>  |  13  ----------------------------<  93  3.7   |  20<L>  |  0.87    Ca    8.8      18 May 2023 05:23  Phos  1.7     05-18  Mg     2.1     05-18    TPro  5.9<L>  /  Alb  2.3<L>  /  TBili  2.5<H>  /  DBili  x   /  AST  88<H>  /  ALT  120<H>  /  AlkPhos  207<H>  05-17                        10.9   10.90 )-----------( 138      ( 17 May 2023 05:34 )             32.0     05-17    143  |  116<H>  |  16  ----------------------------<  99  3.2<L>   |  20<L>  |  0.95    Ca    8.0<L>      17 May 2023 05:34  Phos  1.8     05-17  Mg     1.7     05-17    TPro  5.9<L>  /  Alb  2.3<L>  /  TBili  2.5<H>  /  DBili  x   /  AST  88<H>  /  ALT  120<H>  /  AlkPhos  207<H>  05-17     LIVER FUNCTIONS - ( 17 May 2023 05:34 )  Alb: 2.3 g/dL / Pro: 5.9 gm/dL / ALK PHOS: 207 U/L / ALT: 120 U/L / AST: 88 U/L / GGT: x           (05-14 @ 23:53)  NotDetec      Culture - Blood (collected 16 May 2023 05:29)  Source: .Blood None  Preliminary Report (17 May 2023 09:02):    No growth to date.    Culture - Blood (collected 16 May 2023 05:26)  Source: .Blood None  Preliminary Report (17 May 2023 09:02):    No growth to date.    Culture - Urine (collected 15 May 2023 04:14)  Source: Clean Catch Clean Catch (Midstream)  Final Report (16 May 2023 07:11):    No growth    Culture - Blood (collected 15 May 2023 01:37)  Source: .Blood Blood-Peripheral  Gram Stain (16 May 2023 03:59):    Growth in aerobic bottle: Gram Negative Rods    Growth in anaerobic bottle: Gram Negative Rods  Final Report (17 May 2023 06:51):    Growth in aerobic and anaerobic bottles: Klebsiella pneumoniae  Organism: Blood Culture PCR  Klebsiella pneumoniae (17 May 2023 06:51)  Organism: Klebsiella pneumoniae (17 May 2023 06:51)      Method Type: KATYA      -  Amikacin: S <=16      -  Ampicillin: R >16 These ampicillin results predict results for amoxicillin      -  Ampicillin/Sulbactam: S <=4/2 Enterobacter, Klebsiella aerogenes, Citrobacter, and Serratia may develop resistance during prolonged therapy (3-4 days)      -  Aztreonam: S <=4      -  Cefazolin: S <=2 Enterobacter, Klebsiella aerogenes, Citrobacter, and Serratia may develop resistance during prolonged therapy (3-4 days)      -  Cefepime: S <=2      -  Cefoxitin: S <=8      -  Ceftriaxone: S <=1 Enterobacter, Klebsiella aerogenes, Citrobacter, and Serratia may develop resistance during prolonged therapy      -  Ciprofloxacin: S <=0.25      -  Ertapenem: S <=0.5      -  Gentamicin: S <=2      -  Imipenem: S <=1      -  Levofloxacin: S <=0.5      -  Meropenem: S <=1      -  Piperacillin/Tazobactam: S <=8      -  Tobramycin: S <=2      -  Trimethoprim/Sulfamethoxazole: S <=0.5/9.5  Organism: Blood Culture PCR (17 May 2023 06:51)      Method Type: PCR      -  K. pneumoniae group: Detec (K. pneumoniae, K. quasipneumoniae, K. variicola)    Culture - Blood (collected 14 May 2023 23:53)  Source: .Blood Blood-Peripheral  Gram Stain (16 May 2023 07:20):    Growth in anaerobic bottle: Gram Negative Rods    Growth in aerobic bottle: Gram Negative Rods  Final Report (17 May 2023 06:52):    Growth in aerobic and anaerobic bottles: Klebsiella pneumoniae    See previous culture [id number]    Radiology: all available radiological tests reviewed    < from: US Abdomen Upper Quadrant Right (05.16.23 @ 12:30) >  Common bile duct stent noted within the gallbladder.  Gallbladder wall thickening.  < end of copied text >    < from: CT Abdomen and Pelvis w/ IV Cont (05.15.23 @ 01:16) >  Distended gallbladder with gallbladder wall edema. ? cholecystitis.   Recommend correlation and additional imaging.  Right intrahepatic portal vein thrombosis.  Partial distention versus colitis of the ascending/transverse colon.   Bilateral perinephric stranding without hydroureteronephrosis. Question   striated bilateral nephrogram. Recommend clinical correlation to assess urinary tract infection.  < end of copied text >      Advanced directives addressed: full resuscitation

## 2023-05-19 NOTE — PROGRESS NOTE ADULT - SUBJECTIVE AND OBJECTIVE BOX
72y old  Male who presents with a chief complaint of CC: Septic Shock (17 May 2023 11:32)    24 hour events: off levo since 12 pm on 5/16 5/19 pt awake, denies abd pain, no sob, no CP  Allergies    No Known Allergies    Intolerances      REVIEW OF SYSTEMS:no sob, no abd pain, no vomiting      PHYSICAL EXAM:  Constitutional: nad  HEENT: Atraumatic, LEORA, Normal, No congestion  Respiratory: Breath Sounds normal, no rhonchi/wheeze  Cardiovascular: N S1S2;   Gastrointestinal: Abdomen soft, non tender, mildly distended, soft   Extremities: No edema, peripheral pulses present  Neurological: awake, follows1 step commands  Skin: Non cellulitic, no rash, ulcers      PHYSICAL EXAM:    Daily     Daily     ICU Vital Signs Last 24 Hrs  T(C): 36.5 (19 May 2023 15:24), Max: 37.6 (19 May 2023 00:07)  T(F): 97.7 (19 May 2023 15:24), Max: 99.7 (19 May 2023 00:07)  HR: 68 (19 May 2023 15:24) (65 - 103)  BP: 101/64 (19 May 2023 15:24) (101/64 - 147/82)  BP(mean): 83 (18 May 2023 18:00) (83 - 83)  ABP: --  ABP(mean): --  RR: 20 (19 May 2023 15:24) (18 - 20)  SpO2: 98% (19 May 2023 15:24) (97% - 99%)    O2 Parameters below as of 19 May 2023 15:24  Patient On (Oxygen Delivery Method): room air                                10.7   8.59  )-----------( 196      ( 19 May 2023 07:58 )             31.7       CBC Full  -  ( 19 May 2023 07:58 )  WBC Count : 8.59 K/uL  RBC Count : 3.82 M/uL  Hemoglobin : 10.7 g/dL  Hematocrit : 31.7 %  Platelet Count - Automated : 196 K/uL  Mean Cell Volume : 83.0 fl  Mean Cell Hemoglobin : 28.0 pg  Mean Cell Hemoglobin Concentration : 33.8 gm/dL  Auto Neutrophil # : 5.84 K/uL  Auto Lymphocyte # : 1.63 K/uL  Auto Monocyte # : 0.60 K/uL  Auto Eosinophil # : 0.34 K/uL  Auto Basophil # : 0.09 K/uL  Auto Neutrophil % : 68.0 %  Auto Lymphocyte % : 19.0 %  Auto Monocyte % : 7.0 %  Auto Eosinophil % : 4.0 %  Auto Basophil % : 1.0 %      05-19    142  |  114<H>  |  17  ----------------------------<  102<H>  3.6   |  21<L>  |  1.09    Ca    8.3<L>      19 May 2023 07:58  Phos  1.7     05-18  Mg     2.1     05-18    TPro  6.5  /  Alb  2.3<L>  /  TBili  1.7<H>  /  DBili  x   /  AST  50<H>  /  ALT  71  /  AlkPhos  314<H>  05-19      LIVER FUNCTIONS - ( 19 May 2023 07:58 )  Alb: 2.3 g/dL / Pro: 6.5 gm/dL / ALK PHOS: 314 U/L / ALT: 71 U/L / AST: 50 U/L / GGT: x             PTT - ( 19 May 2023 07:58 )  PTT:60.1 sec                  MEDICATIONS  (STANDING):  atorvastatin 10 milliGRAM(s) Oral at bedtime  heparin  Infusion.  Unit(s)/Hr (11 mL/Hr) IV Continuous <Continuous>  metoprolol tartrate 12.5 milliGRAM(s) Oral every 12 hours  OLANZapine 5 milliGRAM(s) Oral daily  pantoprazole    Tablet 40 milliGRAM(s) Oral before breakfast  piperacillin/tazobactam IVPB.. 3.375 Gram(s) IV Intermittent every 8 hours

## 2023-05-19 NOTE — PROGRESS NOTE ADULT - ASSESSMENT
71 y/o male with PMH HTN, HLD, gastritis,     Admitted for septic shock from acute cholangitis from an obstructing CBD stone   Klebsiella bacteremia   PV thrombosis     S/p ERCP with biliary stent placement on 5/15     Now clinically stable     no plan for cholecystostomy, plan for outpt cholecystectomy  Plan:     Continue Zosyn   Consult ID   Repeat BCx [id number]   No Abernathy, lines  Continue iv heparin if no procedure planned switch to doac  Prerenal ALYSSA - improving, d/c IVF   Replete lytes   Surgery, GI, hematology f/u   TTE with diastolic dysfn, pulm HTN, will resume low dose BB   OOB

## 2023-05-19 NOTE — PROGRESS NOTE ADULT - ASSESSMENT
73 y/o male h/o HTN, gastritis, psych history was admitted on 5/15 for abdominal pain x 2 days associated vomiting, fever, chills. In ER he was noted hypotensive to 70's systolic. He was given 3 L of IV fluids in ED without improvement in BP. ICU consulted for hypotension refractory IV crystalloid. She received zosyn. He was reported with bacteremia. He underwent MRCP that showed 2mm filling defect with CBD dilation.       1. Sepsis with KLPN resolving. Probable acute cholecystitis and acute cholangitis with filling defect s/p prior biliary stent, s/p ERCP with sphincterotomy and stent placement  -hypotension resolved  -cultures reviewed  -on zosyn 3.375 gm IV q8h # 3  -tolerating abx well so far; no side effects noted  -surgical evaluation appreciated  -surgical evaluation appreciated  -hydration   -monitor abdomen  -old chart reviewed to assess prior cultures  -monitor temps  -f/u CBC  -supportive care  2. Other issues:   -care per medicine    d/w critical care team

## 2023-05-20 LAB
ALBUMIN SERPL ELPH-MCNC: 2.2 G/DL — LOW (ref 3.3–5)
ALP SERPL-CCNC: 298 U/L — HIGH (ref 40–120)
ALT FLD-CCNC: 60 U/L — SIGNIFICANT CHANGE UP (ref 12–78)
ANION GAP SERPL CALC-SCNC: 9 MMOL/L — SIGNIFICANT CHANGE UP (ref 5–17)
APTT BLD: 65.1 SEC — HIGH (ref 27.5–35.5)
AST SERPL-CCNC: 47 U/L — HIGH (ref 15–37)
BILIRUB SERPL-MCNC: 1.5 MG/DL — HIGH (ref 0.2–1.2)
BUN SERPL-MCNC: 21 MG/DL — SIGNIFICANT CHANGE UP (ref 7–23)
CALCIUM SERPL-MCNC: 8.4 MG/DL — LOW (ref 8.5–10.1)
CHLORIDE SERPL-SCNC: 112 MMOL/L — HIGH (ref 96–108)
CO2 SERPL-SCNC: 20 MMOL/L — LOW (ref 22–31)
CREAT SERPL-MCNC: 1.21 MG/DL — SIGNIFICANT CHANGE UP (ref 0.5–1.3)
EGFR: 63 ML/MIN/1.73M2 — SIGNIFICANT CHANGE UP
GLUCOSE SERPL-MCNC: 89 MG/DL — SIGNIFICANT CHANGE UP (ref 70–99)
HCT VFR BLD CALC: 29.8 % — LOW (ref 39–50)
HGB BLD-MCNC: 10 G/DL — LOW (ref 13–17)
MCHC RBC-ENTMCNC: 28.2 PG — SIGNIFICANT CHANGE UP (ref 27–34)
MCHC RBC-ENTMCNC: 33.6 GM/DL — SIGNIFICANT CHANGE UP (ref 32–36)
MCV RBC AUTO: 83.9 FL — SIGNIFICANT CHANGE UP (ref 80–100)
PLATELET # BLD AUTO: 223 K/UL — SIGNIFICANT CHANGE UP (ref 150–400)
POTASSIUM SERPL-MCNC: 3.5 MMOL/L — SIGNIFICANT CHANGE UP (ref 3.5–5.3)
POTASSIUM SERPL-SCNC: 3.5 MMOL/L — SIGNIFICANT CHANGE UP (ref 3.5–5.3)
PROT SERPL-MCNC: 6 GM/DL — SIGNIFICANT CHANGE UP (ref 6–8.3)
RBC # BLD: 3.55 M/UL — LOW (ref 4.2–5.8)
RBC # FLD: 15.3 % — HIGH (ref 10.3–14.5)
SODIUM SERPL-SCNC: 141 MMOL/L — SIGNIFICANT CHANGE UP (ref 135–145)
WBC # BLD: 8.22 K/UL — SIGNIFICANT CHANGE UP (ref 3.8–10.5)
WBC # FLD AUTO: 8.22 K/UL — SIGNIFICANT CHANGE UP (ref 3.8–10.5)

## 2023-05-20 PROCEDURE — 99232 SBSQ HOSP IP/OBS MODERATE 35: CPT

## 2023-05-20 RX ORDER — DEXTROSE MONOHYDRATE, SODIUM CHLORIDE, AND POTASSIUM CHLORIDE 50; .745; 4.5 G/1000ML; G/1000ML; G/1000ML
1000 INJECTION, SOLUTION INTRAVENOUS
Refills: 0 | Status: DISCONTINUED | OUTPATIENT
Start: 2023-05-20 | End: 2023-05-23

## 2023-05-20 RX ADMIN — HEPARIN SODIUM 1100 UNIT(S)/HR: 5000 INJECTION INTRAVENOUS; SUBCUTANEOUS at 15:34

## 2023-05-20 RX ADMIN — OLANZAPINE 5 MILLIGRAM(S): 15 TABLET, FILM COATED ORAL at 09:41

## 2023-05-20 RX ADMIN — HEPARIN SODIUM 1100 UNIT(S)/HR: 5000 INJECTION INTRAVENOUS; SUBCUTANEOUS at 19:24

## 2023-05-20 RX ADMIN — PIPERACILLIN AND TAZOBACTAM 25 GRAM(S): 4; .5 INJECTION, POWDER, LYOPHILIZED, FOR SOLUTION INTRAVENOUS at 01:04

## 2023-05-20 RX ADMIN — HEPARIN SODIUM 1100 UNIT(S)/HR: 5000 INJECTION INTRAVENOUS; SUBCUTANEOUS at 08:27

## 2023-05-20 RX ADMIN — Medication 12.5 MILLIGRAM(S): at 09:41

## 2023-05-20 RX ADMIN — PIPERACILLIN AND TAZOBACTAM 25 GRAM(S): 4; .5 INJECTION, POWDER, LYOPHILIZED, FOR SOLUTION INTRAVENOUS at 21:00

## 2023-05-20 RX ADMIN — HEPARIN SODIUM 1100 UNIT(S)/HR: 5000 INJECTION INTRAVENOUS; SUBCUTANEOUS at 07:20

## 2023-05-20 RX ADMIN — PANTOPRAZOLE SODIUM 40 MILLIGRAM(S): 20 TABLET, DELAYED RELEASE ORAL at 05:28

## 2023-05-20 RX ADMIN — Medication 12.5 MILLIGRAM(S): at 21:00

## 2023-05-20 RX ADMIN — DEXTROSE MONOHYDRATE, SODIUM CHLORIDE, AND POTASSIUM CHLORIDE 50 MILLILITER(S): 50; .745; 4.5 INJECTION, SOLUTION INTRAVENOUS at 19:58

## 2023-05-20 RX ADMIN — ATORVASTATIN CALCIUM 10 MILLIGRAM(S): 80 TABLET, FILM COATED ORAL at 21:00

## 2023-05-20 RX ADMIN — PIPERACILLIN AND TAZOBACTAM 25 GRAM(S): 4; .5 INJECTION, POWDER, LYOPHILIZED, FOR SOLUTION INTRAVENOUS at 13:25

## 2023-05-20 NOTE — PROGRESS NOTE ADULT - SUBJECTIVE AND OBJECTIVE BOX
72y old  Male who presents with a chief complaint of CC: Septic Shock (17 May 2023 11:32)    24 hour events: off levo since 12 pm on 5/16 5/20 pt awake, denies abd pain, no sob, no CP, tolerating po, looserr stool  Allergies    No Known Allergies    Intolerances      REVIEW OF SYSTEMS:no sob, no abd pain, no vomiting      PHYSICAL EXAM:  Constitutional: nad  HEENT: Atraumatic, LEORA, Normal, No congestion  Respiratory: Breath Sounds normal, no rhonchi/wheeze  Cardiovascular: N S1S2;   Gastrointestinal: Abdomen soft, non tender, mildly distended, soft   Extremities: No edema, peripheral pulses present  Neurological: awake, follows1 step commands  Skin: Non cellulitic, no rash, ulcers    labs reviewed

## 2023-05-20 NOTE — PROGRESS NOTE ADULT - ASSESSMENT
73 y/o male with PMH HTN, HLD, gastritis,     Admitted for septic shock from acute cholangitis from an obstructing CBD stone   Klebsiella bacteremia   PV thrombosis     S/p ERCP with biliary stent placement on 5/15     Now clinically stable     no plan for cholecystostomy, plan for outpt cholecystectomy  Plan:     Continue Zosyn   Consult ID   Repeat BCx [id number]   No Abernathy, lines  Continue iv heparin if no procedure planned switch to doac  Prerenal ALYSSA - improving, d/c IVF   Replete lytes   Surgery, GI, hematology f/u   TTE with diastolic dysfn, pulm HTN, will resume low dose BB   OOB

## 2023-05-20 NOTE — PROGRESS NOTE ADULT - ASSESSMENT
73 y/o male h/o HTN, gastritis, psych history was admitted on 5/15 for abdominal pain x 2 days associated vomiting, fever, chills. In ER he was noted hypotensive to 70's systolic. He was given 3 L of IV fluids in ED without improvement in BP. ICU consulted for hypotension refractory IV crystalloid. She received zosyn. He was reported with bacteremia. He underwent MRCP that showed 2mm filling defect with CBD dilation.       1. Sepsis with KLPN resolving. Probable acute cholecystitis and acute cholangitis with filling defect s/p prior biliary stent, s/p ERCP with sphincterotomy and stent placement  -hypotension resolved  -cultures reviewed  -on zosyn 3.375 gm IV q8h # 4  -tolerating abx well so far; no side effects noted  -surgical evaluation appreciated  -hydration   -monitor abdomen  -continue abx coverage   -monitor temps  -f/u CBC  -supportive care  2. Other issues:   -care per medicine    d/w critical care team      71 y/o male h/o HTN, gastritis, psych history was admitted on 5/15 for abdominal pain x 2 days associated vomiting, fever, chills. In ER he was noted hypotensive to 70's systolic. He was given 3 L of IV fluids in ED without improvement in BP. ICU consulted for hypotension refractory IV crystalloid. She received zosyn. He was reported with bacteremia. He underwent MRCP that showed 2mm filling defect with CBD dilation.       1. Sepsis with KLPN resolving. Probable acute cholecystitis and acute cholangitis with filling defect s/p prior biliary stent, s/p ERCP with sphincterotomy and stent placement  -hypotension resolved  -has soft looser stools, no leukocytosis to suggest CDAD  -cultures reviewed  -on zosyn 3.375 gm IV q8h # 4  -tolerating abx well so far; no side effects noted  -surgical evaluation appreciated  -repeat BC x 2 are negative to date  -hydration   -monitor abdomen  -continue abx coverage   -monitor temps  -f/u CBC  -supportive care  2. Other issues:   -care per medicine    d/w critical care team

## 2023-05-20 NOTE — PROGRESS NOTE ADULT - SUBJECTIVE AND OBJECTIVE BOX
Date of service: 05-20-23 @ 10:10    Sitting in bed in NAD  Abdomen is improved  No fever    ROS: no fever or chills; denies dizziness, no HA, no SOB or cough, no abdominal pain, no diarrhea or constipation; no dysuria, no legs pain, no rashes    MEDICATIONS  (STANDING):  atorvastatin 10 milliGRAM(s) Oral at bedtime  heparin  Infusion.  Unit(s)/Hr (11 mL/Hr) IV Continuous <Continuous>  metoprolol tartrate 12.5 milliGRAM(s) Oral every 12 hours  OLANZapine 5 milliGRAM(s) Oral daily  pantoprazole    Tablet 40 milliGRAM(s) Oral before breakfast  piperacillin/tazobactam IVPB.. 3.375 Gram(s) IV Intermittent every 8 hours    Vital Signs Last 24 Hrs  T(C): 36.4 (20 May 2023 08:30), Max: 37.1 (19 May 2023 21:29)  T(F): 97.5 (20 May 2023 08:30), Max: 98.8 (19 May 2023 21:29)  HR: 66 (20 May 2023 08:30) (55 - 68)  BP: 110/60 (20 May 2023 08:30) (101/63 - 113/71)  BP(mean): --  RR: 18 (20 May 2023 08:30) (18 - 20)  SpO2: 96% (20 May 2023 08:30) (96% - 100%)    Parameters below as of 20 May 2023 08:30  Patient On (Oxygen Delivery Method): room air     Physical exam:    Constitutional:  No acute distress  HEENT: NC/AT, EOMI, PERRLA, conjunctivae clear; ears and nose atraumatic;   Neck: supple; thyroid not palpable  Back: no tenderness  Respiratory: respiratory effort normal; clear to auscultation  Cardiovascular: S1S2 regular, no murmurs  Abdomen: soft, mid abdomen tender, not distended, positive BS  Genitourinary: no suprapubic tenderness  Lymphatic: no LN palpable  Musculoskeletal: no muscle tenderness, no joint swelling or tenderness  Extremities: no pedal edema  Neurological/ Psychiatric: AxOx3, judgement and insight normal; moving all extremities  Skin: no rashes; no palpable lesions    Labs: reviewed                        10.7   8.59  )-----------( 196      ( 19 May 2023 07:58 )             31.7     05-19    142  |  114<H>  |  17  ----------------------------<  102<H>  3.6   |  21<L>  |  1.09    Ca    8.3<L>      19 May 2023 07:58  Phos  1.7     05-18  Mg     2.1     05-18    TPro  6.5  /  Alb  2.3<L>  /  TBili  1.7<H>  /  DBili  x   /  AST  50<H>  /  ALT  71  /  AlkPhos  314<H>  05-19                        12.3   11.14 )-----------( 171      ( 18 May 2023 05:23 )             35.7     05-18    140  |  113<H>  |  13  ----------------------------<  93  3.7   |  20<L>  |  0.87    Ca    8.8      18 May 2023 05:23  Phos  1.7     05-18  Mg     2.1     05-18    TPro  5.9<L>  /  Alb  2.3<L>  /  TBili  2.5<H>  /  DBili  x   /  AST  88<H>  /  ALT  120<H>  /  AlkPhos  207<H>  05-17                        10.9   10.90 )-----------( 138      ( 17 May 2023 05:34 )             32.0     05-17    143  |  116<H>  |  16  ----------------------------<  99  3.2<L>   |  20<L>  |  0.95    Ca    8.0<L>      17 May 2023 05:34  Phos  1.8     05-17  Mg     1.7     05-17    TPro  5.9<L>  /  Alb  2.3<L>  /  TBili  2.5<H>  /  DBili  x   /  AST  88<H>  /  ALT  120<H>  /  AlkPhos  207<H>  05-17     LIVER FUNCTIONS - ( 17 May 2023 05:34 )  Alb: 2.3 g/dL / Pro: 5.9 gm/dL / ALK PHOS: 207 U/L / ALT: 120 U/L / AST: 88 U/L / GGT: x           (05-14 @ 23:53)  [facility]      Culture - Blood (collected 16 May 2023 05:29)  Source: .Blood None  Preliminary Report (17 May 2023 09:02):    No growth to date.    Culture - Blood (collected 16 May 2023 05:26)  Source: .Blood None  Preliminary Report (17 May 2023 09:02):    No growth to date.    Culture - Urine (collected 15 May 2023 04:14)  Source: Clean Catch Clean Catch (Midstream)  Final Report (16 May 2023 07:11):    No growth    Culture - Blood (collected 15 May 2023 01:37)  Source: .Blood Blood-Peripheral  Gram Stain (16 May 2023 03:59):    Growth in aerobic bottle: Gram Negative Rods    Growth in anaerobic bottle: Gram Negative Rods  Final Report (17 May 2023 06:51):    Growth in aerobic and anaerobic bottles: Klebsiella pneumoniae  Organism: Blood Culture PCR  Klebsiella pneumoniae (17 May 2023 06:51)  Organism: Klebsiella pneumoniae (17 May 2023 06:51)      Method Type: KATYA      -  Amikacin: S <=16      -  Ampicillin: R >16 These ampicillin results predict results for amoxicillin      -  Ampicillin/Sulbactam: S <=4/2 Enterobacter, Klebsiella aerogenes, Citrobacter, and Serratia may develop resistance during prolonged therapy (3-4 days)      -  Aztreonam: S <=4      -  Cefazolin: S <=2 Enterobacter, Klebsiella aerogenes, Citrobacter, and Serratia may develop resistance during prolonged therapy (3-4 days)      -  Cefepime: S <=2      -  Cefoxitin: S <=8      -  Ceftriaxone: S <=1 Enterobacter, Klebsiella aerogenes, Citrobacter, and Serratia may develop resistance during prolonged therapy      -  Ciprofloxacin: S <=0.25      -  Ertapenem: S <=0.5      -  Gentamicin: S <=2      -  Imipenem: S <=1      -  Levofloxacin: S <=0.5      -  Meropenem: S <=1      -  Piperacillin/Tazobactam: S <=8      -  Tobramycin: S <=2      -  Trimethoprim/Sulfamethoxazole: S <=0.5/9.5  Organism: Blood Culture PCR (17 May 2023 06:51)      Method Type: PCR      -  K. pneumoniae group: Detec (K. pneumoniae, K. quasipneumoniae, K. variicola)    Culture - Blood (collected 14 May 2023 23:53)  Source: .Blood Blood-Peripheral  Gram Stain (16 May 2023 07:20):    Growth in anaerobic bottle: Gram Negative Rods    Growth in aerobic bottle: Gram Negative Rods  Final Report (17 May 2023 06:52):    Growth in aerobic and anaerobic bottles: Klebsiella pneumoniae    See previous culture [id number]    Radiology: all available radiological tests reviewed    < from: US Abdomen Upper Quadrant Right (05.16.23 @ 12:30) >  Common bile duct stent noted within the gallbladder.  Gallbladder wall thickening.  < end of copied text >    < from: CT Abdomen and Pelvis w/ IV Cont (05.15.23 @ 01:16) >  Distended gallbladder with gallbladder wall edema. ? cholecystitis.   Recommend correlation and additional imaging.  Right intrahepatic portal vein thrombosis.  Partial distention versus colitis of the ascending/transverse colon.   Bilateral perinephric stranding without hydroureteronephrosis. Question   striated bilateral nephrogram. Recommend clinical correlation to assess urinary tract infection.  < end of copied text >      Advanced directives addressed: full resuscitation Date of service: 05-20-23 @ 10:10    Sitting in bed in NAD  Abdomen is improved  No fever  Has softer, loose stools    ROS: no fever or chills; denies dizziness, no HA, no SOB or cough, no abdominal pain, no diarrhea or constipation; no dysuria, no legs pain, no rashes    MEDICATIONS  (STANDING):  atorvastatin 10 milliGRAM(s) Oral at bedtime  heparin  Infusion.  Unit(s)/Hr (11 mL/Hr) IV Continuous <Continuous>  metoprolol tartrate 12.5 milliGRAM(s) Oral every 12 hours  OLANZapine 5 milliGRAM(s) Oral daily  pantoprazole    Tablet 40 milliGRAM(s) Oral before breakfast  piperacillin/tazobactam IVPB.. 3.375 Gram(s) IV Intermittent every 8 hours    Vital Signs Last 24 Hrs  T(C): 36.4 (20 May 2023 08:30), Max: 37.1 (19 May 2023 21:29)  T(F): 97.5 (20 May 2023 08:30), Max: 98.8 (19 May 2023 21:29)  HR: 66 (20 May 2023 08:30) (55 - 68)  BP: 110/60 (20 May 2023 08:30) (101/63 - 113/71)  BP(mean): --  RR: 18 (20 May 2023 08:30) (18 - 20)  SpO2: 96% (20 May 2023 08:30) (96% - 100%)    Parameters below as of 20 May 2023 08:30  Patient On (Oxygen Delivery Method): room air     Physical exam:    Constitutional:  No acute distress  HEENT: NC/AT, EOMI, PERRLA, conjunctivae clear; ears and nose atraumatic;   Neck: supple; thyroid not palpable  Back: no tenderness  Respiratory: respiratory effort normal; clear to auscultation  Cardiovascular: S1S2 regular, no murmurs  Abdomen: soft, mid abdomen tender, not distended, positive BS  Genitourinary: no suprapubic tenderness  Lymphatic: no LN palpable  Musculoskeletal: no muscle tenderness, no joint swelling or tenderness  Extremities: no pedal edema  Neurological/ Psychiatric: AxOx3, judgement and insight normal; moving all extremities  Skin: no rashes; no palpable lesions    Labs: reviewed                        10.7   8.59  )-----------( 196      ( 19 May 2023 07:58 )             31.7     05-19    142  |  114<H>  |  17  ----------------------------<  102<H>  3.6   |  21<L>  |  1.09    Ca    8.3<L>      19 May 2023 07:58  Phos  1.7     05-18  Mg     2.1     05-18    TPro  6.5  /  Alb  2.3<L>  /  TBili  1.7<H>  /  DBili  x   /  AST  50<H>  /  ALT  71  /  AlkPhos  314<H>  05-19                        12.3   11.14 )-----------( 171      ( 18 May 2023 05:23 )             35.7     05-18    140  |  113<H>  |  13  ----------------------------<  93  3.7   |  20<L>  |  0.87    Ca    8.8      18 May 2023 05:23  Phos  1.7     05-18  Mg     2.1     05-18    TPro  5.9<L>  /  Alb  2.3<L>  /  TBili  2.5<H>  /  DBili  x   /  AST  88<H>  /  ALT  120<H>  /  AlkPhos  207<H>  05-17                        10.9   10.90 )-----------( 138      ( 17 May 2023 05:34 )             32.0     05-17    143  |  116<H>  |  16  ----------------------------<  99  3.2<L>   |  20<L>  |  0.95    Ca    8.0<L>      17 May 2023 05:34  Phos  1.8     05-17  Mg     1.7     05-17    TPro  5.9<L>  /  Alb  2.3<L>  /  TBili  2.5<H>  /  DBili  x   /  AST  88<H>  /  ALT  120<H>  /  AlkPhos  207<H>  05-17     LIVER FUNCTIONS - ( 17 May 2023 05:34 )  Alb: 2.3 g/dL / Pro: 5.9 gm/dL / ALK PHOS: 207 U/L / ALT: 120 U/L / AST: 88 U/L / GGT: x           (05-14 @ 23:53)  [facility]      Culture - Blood (collected 16 May 2023 05:29)  Source: .Blood None  Preliminary Report (17 May 2023 09:02):    No growth to date.    Culture - Blood (collected 16 May 2023 05:26)  Source: .Blood None  Preliminary Report (17 May 2023 09:02):    No growth to date.    Culture - Urine (collected 15 May 2023 04:14)  Source: Clean Catch Clean Catch (Midstream)  Final Report (16 May 2023 07:11):    No growth    Culture - Blood (collected 15 May 2023 01:37)  Source: .Blood Blood-Peripheral  Gram Stain (16 May 2023 03:59):    Growth in aerobic bottle: Gram Negative Rods    Growth in anaerobic bottle: Gram Negative Rods  Final Report (17 May 2023 06:51):    Growth in aerobic and anaerobic bottles: Klebsiella pneumoniae  Organism: Blood Culture PCR  Klebsiella pneumoniae (17 May 2023 06:51)  Organism: Klebsiella pneumoniae (17 May 2023 06:51)      Method Type: KATYA      -  Amikacin: S <=16      -  Ampicillin: R >16 These ampicillin results predict results for amoxicillin      -  Ampicillin/Sulbactam: S <=4/2 Enterobacter, Klebsiella aerogenes, Citrobacter, and Serratia may develop resistance during prolonged therapy (3-4 days)      -  Aztreonam: S <=4      -  Cefazolin: S <=2 Enterobacter, Klebsiella aerogenes, Citrobacter, and Serratia may develop resistance during prolonged therapy (3-4 days)      -  Cefepime: S <=2      -  Cefoxitin: S <=8      -  Ceftriaxone: S <=1 Enterobacter, Klebsiella aerogenes, Citrobacter, and Serratia may develop resistance during prolonged therapy      -  Ciprofloxacin: S <=0.25      -  Ertapenem: S <=0.5      -  Gentamicin: S <=2      -  Imipenem: S <=1      -  Levofloxacin: S <=0.5      -  Meropenem: S <=1      -  Piperacillin/Tazobactam: S <=8      -  Tobramycin: S <=2      -  Trimethoprim/Sulfamethoxazole: S <=0.5/9.5  Organism: Blood Culture PCR (17 May 2023 06:51)      Method Type: PCR      -  K. pneumoniae group: Detec (K. pneumoniae, K. quasipneumoniae, K. variicola)    Culture - Blood (collected 14 May 2023 23:53)  Source: .Blood Blood-Peripheral  Gram Stain (16 May 2023 07:20):    Growth in anaerobic bottle: Gram Negative Rods    Growth in aerobic bottle: Gram Negative Rods  Final Report (17 May 2023 06:52):    Growth in aerobic and anaerobic bottles: Klebsiella pneumoniae    See previous culture [id number]    Radiology: all available radiological tests reviewed    < from: US Abdomen Upper Quadrant Right (05.16.23 @ 12:30) >  Common bile duct stent noted within the gallbladder.  Gallbladder wall thickening.  < end of copied text >    < from: CT Abdomen and Pelvis w/ IV Cont (05.15.23 @ 01:16) >  Distended gallbladder with gallbladder wall edema. ? cholecystitis.   Recommend correlation and additional imaging.  Right intrahepatic portal vein thrombosis.  Partial distention versus colitis of the ascending/transverse colon.   Bilateral perinephric stranding without hydroureteronephrosis. Question   striated bilateral nephrogram. Recommend clinical correlation to assess urinary tract infection.  < end of copied text >      Advanced directives addressed: full resuscitation

## 2023-05-21 LAB
ALBUMIN SERPL ELPH-MCNC: 2.3 G/DL — LOW (ref 3.3–5)
ALP SERPL-CCNC: 329 U/L — HIGH (ref 40–120)
ALT FLD-CCNC: 55 U/L — SIGNIFICANT CHANGE UP (ref 12–78)
ANION GAP SERPL CALC-SCNC: 6 MMOL/L — SIGNIFICANT CHANGE UP (ref 5–17)
APTT BLD: 61.2 SEC — HIGH (ref 27.5–35.5)
AST SERPL-CCNC: 46 U/L — HIGH (ref 15–37)
BASOPHILS # BLD AUTO: 0.07 K/UL — SIGNIFICANT CHANGE UP (ref 0–0.2)
BASOPHILS NFR BLD AUTO: 0.8 % — SIGNIFICANT CHANGE UP (ref 0–2)
BILIRUB SERPL-MCNC: 1.2 MG/DL — SIGNIFICANT CHANGE UP (ref 0.2–1.2)
BUN SERPL-MCNC: 15 MG/DL — SIGNIFICANT CHANGE UP (ref 7–23)
CALCIUM SERPL-MCNC: 8.3 MG/DL — LOW (ref 8.5–10.1)
CHLORIDE SERPL-SCNC: 114 MMOL/L — HIGH (ref 96–108)
CO2 SERPL-SCNC: 21 MMOL/L — LOW (ref 22–31)
CREAT SERPL-MCNC: 1.16 MG/DL — SIGNIFICANT CHANGE UP (ref 0.5–1.3)
CULTURE RESULTS: SIGNIFICANT CHANGE UP
EGFR: 67 ML/MIN/1.73M2 — SIGNIFICANT CHANGE UP
EOSINOPHIL # BLD AUTO: 0.48 K/UL — SIGNIFICANT CHANGE UP (ref 0–0.5)
EOSINOPHIL NFR BLD AUTO: 5.8 % — SIGNIFICANT CHANGE UP (ref 0–6)
GLUCOSE SERPL-MCNC: 100 MG/DL — HIGH (ref 70–99)
HCT VFR BLD CALC: 30.4 % — LOW (ref 39–50)
HGB BLD-MCNC: 10 G/DL — LOW (ref 13–17)
IMM GRANULOCYTES NFR BLD AUTO: 5.8 % — HIGH (ref 0–0.9)
LYMPHOCYTES # BLD AUTO: 1.28 K/UL — SIGNIFICANT CHANGE UP (ref 1–3.3)
LYMPHOCYTES # BLD AUTO: 15.5 % — SIGNIFICANT CHANGE UP (ref 13–44)
MCHC RBC-ENTMCNC: 27.9 PG — SIGNIFICANT CHANGE UP (ref 27–34)
MCHC RBC-ENTMCNC: 32.9 GM/DL — SIGNIFICANT CHANGE UP (ref 32–36)
MCV RBC AUTO: 84.9 FL — SIGNIFICANT CHANGE UP (ref 80–100)
MONOCYTES # BLD AUTO: 0.81 K/UL — SIGNIFICANT CHANGE UP (ref 0–0.9)
MONOCYTES NFR BLD AUTO: 9.8 % — SIGNIFICANT CHANGE UP (ref 2–14)
NEUTROPHILS # BLD AUTO: 5.16 K/UL — SIGNIFICANT CHANGE UP (ref 1.8–7.4)
NEUTROPHILS NFR BLD AUTO: 62.3 % — SIGNIFICANT CHANGE UP (ref 43–77)
PLATELET # BLD AUTO: 289 K/UL — SIGNIFICANT CHANGE UP (ref 150–400)
POTASSIUM SERPL-MCNC: 3.5 MMOL/L — SIGNIFICANT CHANGE UP (ref 3.5–5.3)
POTASSIUM SERPL-SCNC: 3.5 MMOL/L — SIGNIFICANT CHANGE UP (ref 3.5–5.3)
PROT SERPL-MCNC: 6.3 GM/DL — SIGNIFICANT CHANGE UP (ref 6–8.3)
RBC # BLD: 3.58 M/UL — LOW (ref 4.2–5.8)
RBC # FLD: 15.8 % — HIGH (ref 10.3–14.5)
SODIUM SERPL-SCNC: 141 MMOL/L — SIGNIFICANT CHANGE UP (ref 135–145)
SPECIMEN SOURCE: SIGNIFICANT CHANGE UP
WBC # BLD: 8.28 K/UL — SIGNIFICANT CHANGE UP (ref 3.8–10.5)
WBC # FLD AUTO: 8.28 K/UL — SIGNIFICANT CHANGE UP (ref 3.8–10.5)

## 2023-05-21 PROCEDURE — 99232 SBSQ HOSP IP/OBS MODERATE 35: CPT

## 2023-05-21 RX ADMIN — Medication 12.5 MILLIGRAM(S): at 22:18

## 2023-05-21 RX ADMIN — HEPARIN SODIUM 1100 UNIT(S)/HR: 5000 INJECTION INTRAVENOUS; SUBCUTANEOUS at 07:08

## 2023-05-21 RX ADMIN — ATORVASTATIN CALCIUM 10 MILLIGRAM(S): 80 TABLET, FILM COATED ORAL at 22:19

## 2023-05-21 RX ADMIN — PIPERACILLIN AND TAZOBACTAM 25 GRAM(S): 4; .5 INJECTION, POWDER, LYOPHILIZED, FOR SOLUTION INTRAVENOUS at 22:18

## 2023-05-21 RX ADMIN — OLANZAPINE 5 MILLIGRAM(S): 15 TABLET, FILM COATED ORAL at 09:58

## 2023-05-21 RX ADMIN — Medication 12.5 MILLIGRAM(S): at 09:58

## 2023-05-21 RX ADMIN — PIPERACILLIN AND TAZOBACTAM 25 GRAM(S): 4; .5 INJECTION, POWDER, LYOPHILIZED, FOR SOLUTION INTRAVENOUS at 13:07

## 2023-05-21 RX ADMIN — HEPARIN SODIUM 1100 UNIT(S)/HR: 5000 INJECTION INTRAVENOUS; SUBCUTANEOUS at 09:57

## 2023-05-21 RX ADMIN — HEPARIN SODIUM 1100 UNIT(S)/HR: 5000 INJECTION INTRAVENOUS; SUBCUTANEOUS at 19:19

## 2023-05-21 RX ADMIN — PIPERACILLIN AND TAZOBACTAM 25 GRAM(S): 4; .5 INJECTION, POWDER, LYOPHILIZED, FOR SOLUTION INTRAVENOUS at 06:00

## 2023-05-21 RX ADMIN — PANTOPRAZOLE SODIUM 40 MILLIGRAM(S): 20 TABLET, DELAYED RELEASE ORAL at 06:00

## 2023-05-21 RX ADMIN — Medication 650 MILLIGRAM(S): at 22:24

## 2023-05-21 RX ADMIN — HEPARIN SODIUM 1100 UNIT(S)/HR: 5000 INJECTION INTRAVENOUS; SUBCUTANEOUS at 12:53

## 2023-05-21 NOTE — PROGRESS NOTE ADULT - ASSESSMENT
71 y/o male h/o HTN, gastritis, psych history was admitted on 5/15 for abdominal pain x 2 days associated vomiting, fever, chills. In ER he was noted hypotensive to 70's systolic. He was given 3 L of IV fluids in ED without improvement in BP. ICU consulted for hypotension refractory IV crystalloid. She received zosyn. He was reported with bacteremia. He underwent MRCP that showed 2mm filling defect with CBD dilation.       1. Sepsis with KLPN resolving. Probable acute cholecystitis and acute cholangitis with filling defect s/p prior biliary stent, s/p ERCP with sphincterotomy and stent placement  -hypotension resolved  -has soft looser stools, no leukocytosis to suggest CDAD  -cultures reviewed  -on zosyn 3.375 gm IV q8h # 5  -tolerating abx well so far; no side effects noted  -surgical evaluation appreciated  -repeat BC x 2 are negative to date  -hydration   -monitor abdomen  -continue abx coverage, plan to change to oral coverage soon if continues to improve  -monitor temps  -f/u CBC  -supportive care  2. Other issues:   -care per medicine    d/w critical care team

## 2023-05-21 NOTE — PROGRESS NOTE ADULT - SUBJECTIVE AND OBJECTIVE BOX
Date of service: 05-21-23 @ 10:01    Lying in bed in NAD  Has low grade fever  Abdomen feels less distended    ROS: no fever or chills; denies dizziness, no HA, no SOB or cough, no diarrhea or constipation; no dysuria, no legs pain, no rashes    MEDICATIONS  (STANDING):  atorvastatin 10 milliGRAM(s) Oral at bedtime  heparin  Infusion.  Unit(s)/Hr (11 mL/Hr) IV Continuous <Continuous>  metoprolol tartrate 12.5 milliGRAM(s) Oral every 12 hours  OLANZapine 5 milliGRAM(s) Oral daily  pantoprazole    Tablet 40 milliGRAM(s) Oral before breakfast  piperacillin/tazobactam IVPB.. 3.375 Gram(s) IV Intermittent every 8 hours  sodium chloride 0.45% with potassium chloride 20 mEq/L 1000 milliLiter(s) (50 mL/Hr) IV Continuous <Continuous>    Vital Signs Last 24 Hrs  T(C): 36.5 (21 May 2023 08:14), Max: 37.2 (20 May 2023 15:35)  T(F): 97.7 (21 May 2023 08:14), Max: 99 (20 May 2023 15:35)  HR: 66 (21 May 2023 08:14) (63 - 66)  BP: 114/74 (21 May 2023 08:14) (94/65 - 114/74)  BP(mean): --  RR: 18 (21 May 2023 08:14) (16 - 18)  SpO2: 100% (21 May 2023 08:14) (94% - 100%)    Parameters below as of 21 May 2023 08:14  Patient On (Oxygen Delivery Method): room air     Physical exam:    Constitutional:  No acute distress  HEENT: NC/AT, EOMI, PERRLA, conjunctivae clear; ears and nose atraumatic;   Neck: supple; thyroid not palpable  Back: no tenderness  Respiratory: respiratory effort normal; clear to auscultation  Cardiovascular: S1S2 regular, no murmurs  Abdomen: soft, mid abdomen tender, not distended, positive BS  Genitourinary: no suprapubic tenderness  Lymphatic: no LN palpable  Musculoskeletal: no muscle tenderness, no joint swelling or tenderness  Extremities: no pedal edema  Neurological/ Psychiatric: AxOx3, judgement and insight normal; moving all extremities  Skin: no rashes; no palpable lesions    Labs: reviewed                        10.0   8.28  )-----------( 289      ( 21 May 2023 08:38 )             30.4     05-21    141  |  114<H>  |  15  ----------------------------<  100<H>  3.5   |  21<L>  |  1.16    Ca    8.3<L>      21 May 2023 08:38    TPro  6.3  /  Alb  2.3<L>  /  TBili  1.2  /  DBili  x   /  AST  46<H>  /  ALT  55  /  AlkPhos  329<H>  05-21                        12.3   11.14 )-----------( 171      ( 18 May 2023 05:23 )             35.7     05-18    140  |  113<H>  |  13  ----------------------------<  93  3.7   |  20<L>  |  0.87    Ca    8.8      18 May 2023 05:23  Phos  1.7     05-18  Mg     2.1     05-18    TPro  5.9<L>  /  Alb  2.3<L>  /  TBili  2.5<H>  /  DBili  x   /  AST  88<H>  /  ALT  120<H>  /  AlkPhos  207<H>  05-17                        10.9   10.90 )-----------( 138      ( 17 May 2023 05:34 )             32.0     05-17    143  |  116<H>  |  16  ----------------------------<  99  3.2<L>   |  20<L>  |  0.95    Ca    8.0<L>      17 May 2023 05:34  Phos  1.8     05-17  Mg     1.7     05-17    TPro  5.9<L>  /  Alb  2.3<L>  /  TBili  2.5<H>  /  DBili  x   /  AST  88<H>  /  ALT  120<H>  /  AlkPhos  207<H>  05-17     LIVER FUNCTIONS - ( 17 May 2023 05:34 )  Alb: 2.3 g/dL / Pro: 5.9 gm/dL / ALK PHOS: 207 U/L / ALT: 120 U/L / AST: 88 U/L / GGT: x           (05-14 @ 23:53)  [facility]      Culture - Blood (collected 16 May 2023 05:29)  Source: .Blood None  Preliminary Report (17 May 2023 09:02):    No growth to date.    Culture - Blood (collected 16 May 2023 05:26)  Source: .Blood None  Preliminary Report (17 May 2023 09:02):    No growth to date.    Culture - Urine (collected 15 May 2023 04:14)  Source: Clean Catch Clean Catch (Midstream)  Final Report (16 May 2023 07:11):    No growth    Culture - Blood (collected 15 May 2023 01:37)  Source: .Blood Blood-Peripheral  Gram Stain (16 May 2023 03:59):    Growth in aerobic bottle: Gram Negative Rods    Growth in anaerobic bottle: Gram Negative Rods  Final Report (17 May 2023 06:51):    Growth in aerobic and anaerobic bottles: Klebsiella pneumoniae  Organism: Blood Culture PCR  Klebsiella pneumoniae (17 May 2023 06:51)  Organism: Klebsiella pneumoniae (17 May 2023 06:51)      Method Type: KATYA      -  Amikacin: S <=16      -  Ampicillin: R >16 These ampicillin results predict results for amoxicillin      -  Ampicillin/Sulbactam: S <=4/2 Enterobacter, Klebsiella aerogenes, Citrobacter, and Serratia may develop resistance during prolonged therapy (3-4 days)      -  Aztreonam: S <=4      -  Cefazolin: S <=2 Enterobacter, Klebsiella aerogenes, Citrobacter, and Serratia may develop resistance during prolonged therapy (3-4 days)      -  Cefepime: S <=2      -  Cefoxitin: S <=8      -  Ceftriaxone: S <=1 Enterobacter, Klebsiella aerogenes, Citrobacter, and Serratia may develop resistance during prolonged therapy      -  Ciprofloxacin: S <=0.25      -  Ertapenem: S <=0.5      -  Gentamicin: S <=2      -  Imipenem: S <=1      -  Levofloxacin: S <=0.5      -  Meropenem: S <=1      -  Piperacillin/Tazobactam: S <=8      -  Tobramycin: S <=2      -  Trimethoprim/Sulfamethoxazole: S <=0.5/9.5  Organism: Blood Culture PCR (17 May 2023 06:51)      Method Type: PCR      -  K. pneumoniae group: Detec (K. pneumoniae, K. quasipneumoniae, K. variicola)    Culture - Blood (collected 14 May 2023 23:53)  Source: .Blood Blood-Peripheral  Gram Stain (16 May 2023 07:20):    Growth in anaerobic bottle: Gram Negative Rods    Growth in aerobic bottle: Gram Negative Rods  Final Report (17 May 2023 06:52):    Growth in aerobic and anaerobic bottles: Klebsiella pneumoniae    See previous culture [id number]    Radiology: all available radiological tests reviewed    < from: US Abdomen Upper Quadrant Right (05.16.23 @ 12:30) >  Common bile duct stent noted within the gallbladder.  Gallbladder wall thickening.  < end of copied text >    < from: CT Abdomen and Pelvis w/ IV Cont (05.15.23 @ 01:16) >  Distended gallbladder with gallbladder wall edema. ? cholecystitis.   Recommend correlation and additional imaging.  Right intrahepatic portal vein thrombosis.  Partial distention versus colitis of the ascending/transverse colon.   Bilateral perinephric stranding without hydroureteronephrosis. Question   striated bilateral nephrogram. Recommend clinical correlation to assess urinary tract infection.  < end of copied text >      Advanced directives addressed: full resuscitation

## 2023-05-21 NOTE — PROGRESS NOTE ADULT - SUBJECTIVE AND OBJECTIVE BOX
72y old  Male who presents with a chief complaint of CC: Septic Shock (17 May 2023 11:32)    24 hour events: off levo since 12 pm on 5/16 5/21 pt awake, denies abd pain, no sob, no CP, tolerating po, no diarrhea today  Allergies    No Known Allergies    Intolerances      REVIEW OF SYSTEMS:no sob, no abd pain, no vomiting      PHYSICAL EXAM:  Constitutional: nad  HEENT: Atraumatic, LEORA, Normal, No congestion  Respiratory: Breath Sounds normal, no rhonchi/wheeze  Cardiovascular: N S1S2;   Gastrointestinal: Abdomen soft, non tender, mildly distended, soft   Extremities: No edema, peripheral pulses present  Neurological: awake, follows1 step commands  Skin: Non cellulitic, no rash, ulcers    labs reviewed

## 2023-05-22 LAB
APTT BLD: 118.9 SEC — HIGH (ref 27.5–35.5)
APTT BLD: 45 SEC — HIGH (ref 27.5–35.5)
APTT BLD: 79.3 SEC — HIGH (ref 27.5–35.5)
HCT VFR BLD CALC: 30 % — LOW (ref 39–50)
HGB BLD-MCNC: 9.8 G/DL — LOW (ref 13–17)
MCHC RBC-ENTMCNC: 28.3 PG — SIGNIFICANT CHANGE UP (ref 27–34)
MCHC RBC-ENTMCNC: 32.7 GM/DL — SIGNIFICANT CHANGE UP (ref 32–36)
MCV RBC AUTO: 86.7 FL — SIGNIFICANT CHANGE UP (ref 80–100)
PLATELET # BLD AUTO: 330 K/UL — SIGNIFICANT CHANGE UP (ref 150–400)
RBC # BLD: 3.46 M/UL — LOW (ref 4.2–5.8)
RBC # FLD: 16.2 % — HIGH (ref 10.3–14.5)
WBC # BLD: 8.08 K/UL — SIGNIFICANT CHANGE UP (ref 3.8–10.5)
WBC # FLD AUTO: 8.08 K/UL — SIGNIFICANT CHANGE UP (ref 3.8–10.5)

## 2023-05-22 PROCEDURE — 99232 SBSQ HOSP IP/OBS MODERATE 35: CPT

## 2023-05-22 RX ORDER — CEFUROXIME AXETIL 250 MG
500 TABLET ORAL EVERY 12 HOURS
Refills: 0 | Status: DISCONTINUED | OUTPATIENT
Start: 2023-05-22 | End: 2023-05-23

## 2023-05-22 RX ADMIN — PIPERACILLIN AND TAZOBACTAM 25 GRAM(S): 4; .5 INJECTION, POWDER, LYOPHILIZED, FOR SOLUTION INTRAVENOUS at 06:17

## 2023-05-22 RX ADMIN — HEPARIN SODIUM 1200 UNIT(S)/HR: 5000 INJECTION INTRAVENOUS; SUBCUTANEOUS at 07:46

## 2023-05-22 RX ADMIN — Medication 12.5 MILLIGRAM(S): at 21:02

## 2023-05-22 RX ADMIN — OLANZAPINE 5 MILLIGRAM(S): 15 TABLET, FILM COATED ORAL at 09:40

## 2023-05-22 RX ADMIN — HEPARIN SODIUM 1100 UNIT(S)/HR: 5000 INJECTION INTRAVENOUS; SUBCUTANEOUS at 14:13

## 2023-05-22 RX ADMIN — HEPARIN SODIUM 1100 UNIT(S)/HR: 5000 INJECTION INTRAVENOUS; SUBCUTANEOUS at 19:20

## 2023-05-22 RX ADMIN — Medication 12.5 MILLIGRAM(S): at 09:39

## 2023-05-22 RX ADMIN — Medication 500 MILLIGRAM(S): at 21:02

## 2023-05-22 RX ADMIN — HEPARIN SODIUM 1200 UNIT(S)/HR: 5000 INJECTION INTRAVENOUS; SUBCUTANEOUS at 10:31

## 2023-05-22 RX ADMIN — PANTOPRAZOLE SODIUM 40 MILLIGRAM(S): 20 TABLET, DELAYED RELEASE ORAL at 06:17

## 2023-05-22 RX ADMIN — ATORVASTATIN CALCIUM 10 MILLIGRAM(S): 80 TABLET, FILM COATED ORAL at 21:02

## 2023-05-22 RX ADMIN — HEPARIN SODIUM 1100 UNIT(S)/HR: 5000 INJECTION INTRAVENOUS; SUBCUTANEOUS at 07:37

## 2023-05-22 RX ADMIN — HEPARIN SODIUM 1100 UNIT(S)/HR: 5000 INJECTION INTRAVENOUS; SUBCUTANEOUS at 20:55

## 2023-05-22 NOTE — PROGRESS NOTE ADULT - SUBJECTIVE AND OBJECTIVE BOX
Date of service: 05-22-23 @ 12:14    Lying in bed in NAD  Abdomen feels less distended  Has low grade fever    ROS: denies dizziness, no HA, no SOB or cough, no abdominal pain, no diarrhea or constipation; no dysuria, no legs pain, no rashes    MEDICATIONS  (STANDING):  atorvastatin 10 milliGRAM(s) Oral at bedtime  heparin  Infusion.  Unit(s)/Hr (11 mL/Hr) IV Continuous <Continuous>  metoprolol tartrate 12.5 milliGRAM(s) Oral every 12 hours  OLANZapine 5 milliGRAM(s) Oral daily  pantoprazole    Tablet 40 milliGRAM(s) Oral before breakfast  piperacillin/tazobactam IVPB.. 3.375 Gram(s) IV Intermittent every 8 hours  sodium chloride 0.45% with potassium chloride 20 mEq/L 1000 milliLiter(s) (50 mL/Hr) IV Continuous <Continuous>    Vital Signs Last 24 Hrs  T(C): 36.4 (22 May 2023 08:06), Max: 37.6 (21 May 2023 15:55)  T(F): 97.6 (22 May 2023 08:06), Max: 99.7 (21 May 2023 15:55)  HR: 67 (22 May 2023 08:06) (60 - 79)  BP: 131/71 (22 May 2023 08:06) (117/63 - 131/71)  BP(mean): --  RR: 18 (22 May 2023 08:06) (18 - 18)  SpO2: 95% (22 May 2023 08:06) (95% - 96%)    Parameters below as of 22 May 2023 08:06  Patient On (Oxygen Delivery Method): room air     Physical exam:    Constitutional:  No acute distress  HEENT: NC/AT, EOMI, PERRLA, conjunctivae clear; ears and nose atraumatic;   Neck: supple; thyroid not palpable  Back: no tenderness  Respiratory: respiratory effort normal; clear to auscultation  Cardiovascular: S1S2 regular, no murmurs  Abdomen: soft, mid abdomen tender, not distended, positive BS  Genitourinary: no suprapubic tenderness  Lymphatic: no LN palpable  Musculoskeletal: no muscle tenderness, no joint swelling or tenderness  Extremities: no pedal edema  Neurological/ Psychiatric: AxOx3, judgement and insight normal; moving all extremities  Skin: no rashes; no palpable lesions    Labs: reviewed                        10.0   8.28  )-----------( 289      ( 21 May 2023 08:38 )             30.4     05-21    141  |  114<H>  |  15  ----------------------------<  100<H>  3.5   |  21<L>  |  1.16    Ca    8.3<L>      21 May 2023 08:38    TPro  6.3  /  Alb  2.3<L>  /  TBili  1.2  /  DBili  x   /  AST  46<H>  /  ALT  55  /  AlkPhos  329<H>  05-21                                   10.9   10.90 )-----------( 138      ( 17 May 2023 05:34 )             32.0     05-17    143  |  116<H>  |  16  ----------------------------<  99  3.2<L>   |  20<L>  |  0.95    Ca    8.0<L>      17 May 2023 05:34  Phos  1.8     05-17  Mg     1.7     05-17    TPro  5.9<L>  /  Alb  2.3<L>  /  TBili  2.5<H>  /  DBili  x   /  AST  88<H>  /  ALT  120<H>  /  AlkPhos  207<H>  05-17     LIVER FUNCTIONS - ( 17 May 2023 05:34 )  Alb: 2.3 g/dL / Pro: 5.9 gm/dL / ALK PHOS: 207 U/L / ALT: 120 U/L / AST: 88 U/L / GGT: x           (05-14 @ 23:53)  [facility]      Culture - Blood (collected 16 May 2023 05:29)  Source: .Blood None  Preliminary Report (17 May 2023 09:02):    No growth to date.    Culture - Blood (collected 16 May 2023 05:26)  Source: .Blood None  Preliminary Report (17 May 2023 09:02):    No growth to date.    Culture - Urine (collected 15 May 2023 04:14)  Source: Clean Catch Clean Catch (Midstream)  Final Report (16 May 2023 07:11):    No growth    Culture - Blood (collected 15 May 2023 01:37)  Source: .Blood Blood-Peripheral  Gram Stain (16 May 2023 03:59):    Growth in aerobic bottle: Gram Negative Rods    Growth in anaerobic bottle: Gram Negative Rods  Final Report (17 May 2023 06:51):    Growth in aerobic and anaerobic bottles: Klebsiella pneumoniae  Organism: Blood Culture PCR  Klebsiella pneumoniae (17 May 2023 06:51)  Organism: Klebsiella pneumoniae (17 May 2023 06:51)      Method Type: KATYA      -  Amikacin: S <=16      -  Ampicillin: R >16 These ampicillin results predict results for amoxicillin      -  Ampicillin/Sulbactam: S <=4/2 Enterobacter, Klebsiella aerogenes, Citrobacter, and Serratia may develop resistance during prolonged therapy (3-4 days)      -  Aztreonam: S <=4      -  Cefazolin: S <=2 Enterobacter, Klebsiella aerogenes, Citrobacter, and Serratia may develop resistance during prolonged therapy (3-4 days)      -  Cefepime: S <=2      -  Cefoxitin: S <=8      -  Ceftriaxone: S <=1 Enterobacter, Klebsiella aerogenes, Citrobacter, and Serratia may develop resistance during prolonged therapy      -  Ciprofloxacin: S <=0.25      -  Ertapenem: S <=0.5      -  Gentamicin: S <=2      -  Imipenem: S <=1      -  Levofloxacin: S <=0.5      -  Meropenem: S <=1      -  Piperacillin/Tazobactam: S <=8      -  Tobramycin: S <=2      -  Trimethoprim/Sulfamethoxazole: S <=0.5/9.5  Organism: Blood Culture PCR (17 May 2023 06:51)      Method Type: PCR      -  K. pneumoniae group: Detec (K. pneumoniae, K. quasipneumoniae, K. variicola)    Culture - Blood (collected 14 May 2023 23:53)  Source: .Blood Blood-Peripheral  Gram Stain (16 May 2023 07:20):    Growth in anaerobic bottle: Gram Negative Rods    Growth in aerobic bottle: Gram Negative Rods  Final Report (17 May 2023 06:52):    Growth in aerobic and anaerobic bottles: Klebsiella pneumoniae    See previous culture [id number]    Radiology: all available radiological tests reviewed    < from: US Abdomen Upper Quadrant Right (05.16.23 @ 12:30) >  Common bile duct stent noted within the gallbladder.  Gallbladder wall thickening.  < end of copied text >    < from: CT Abdomen and Pelvis w/ IV Cont (05.15.23 @ 01:16) >  Distended gallbladder with gallbladder wall edema. ? cholecystitis.   Recommend correlation and additional imaging.  Right intrahepatic portal vein thrombosis.  Partial distention versus colitis of the ascending/transverse colon.   Bilateral perinephric stranding without hydroureteronephrosis. Question   striated bilateral nephrogram. Recommend clinical correlation to assess urinary tract infection.  < end of copied text >      Advanced directives addressed: full resuscitation

## 2023-05-22 NOTE — CHART NOTE - NSCHARTNOTEFT_GEN_A_CORE
GI asked to weigh in on and clear patient for transition from UFH to DOAC. Hgb 9.8 from trending in the 10's while on UFH. Known gastritis. Maintain on PPI. May transition to DOAC.  Discussed with Dr. Colon

## 2023-05-22 NOTE — PROGRESS NOTE ADULT - PROVIDER SPECIALTY LIST ADULT
Heme/Onc
Infectious Disease
Infectious Disease
MICU
Hospitalist
Surgery
Critical Care
Infectious Disease
MICU
Hospitalist
Hospitalist
Infectious Disease
Infectious Disease
Critical Care

## 2023-05-22 NOTE — PROGRESS NOTE ADULT - ASSESSMENT
71 y/o male with PMH HTN, HLD, gastritis,     Admitted for septic shock from acute cholangitis from an obstructing CBD stone   Klebsiella bacteremia   PV thrombosis   S/p ERCP with biliary stent placement on 5/15     Now slowly dropping Hb - acute slowly progressive anemia inflammatory anemia vs r/o slow GI bleed while on AC - hx gastritis  Had soft looser stools, no leukocytosis to suggest CDAD- diarrhea resolved   Plan:   Hb slowly dropping while on heparin drip - check iron retic,FOBT , GI f/u - I notified Shaniqua GI NP  transition to  DOAc on hb stable - and when GI clears  heme  recommended  to give Eliquis 5 mg BID without loading dose. as pt has already been on heparin for several days  s/p zosyn #6, switch to ceftin 500 BID for 8 more days to complete 14 day course- ID consult appreciated   no plan for in patient  cholecystostomy, plan for outpt cholecystectomy  osyn   Consult ID   Repeat BCx NGTD   No Abernathy, lines  Prerenal ALYSSA -resolved , d/c IVF   Replete lytes   Surgery, GI, hematology f/u   TTE with diastolic dysfn, pulm HTN, will resume low dose BB   OOB     dispo- dc home with home care pending anemia w/u ordered and GI clearance for switching to DOAC, discharge was deferred today due to further trending down of hb   I called son agatha unable to reach- no voicemail set up  informed RN, BUCK and patient

## 2023-05-22 NOTE — PROGRESS NOTE ADULT - ASSESSMENT
71 y/o male h/o HTN, gastritis, psych history was admitted on 5/15 for abdominal pain x 2 days associated vomiting, fever, chills. In ER he was noted hypotensive to 70's systolic. He was given 3 L of IV fluids in ED without improvement in BP. ICU consulted for hypotension refractory IV crystalloid. She received zosyn. He was reported with bacteremia. He underwent MRCP that showed 2mm filling defect with CBD dilation.       1. Sepsis with KLPN resolved. Probable acute cholecystitis and acute cholangitis with filling defect s/p prior biliary stent, s/p ERCP with sphincterotomy and stent placement  -hypotension resolved  -has soft looser stools, no leukocytosis to suggest CDAD  -cultures reviewed  -on zosyn 3.375 gm IV q8h # 6  -tolerating abx well so far; no side effects noted  -surgical evaluation appreciated  -repeat BC x 2 are negative to date  -hydration   -monitor abdomen  -may change abx to ceftin 500 mg PO q12h to complete 14 days  -monitor temps  -f/u CBC  -supportive care  2. Other issues:   -care per medicine    d/w critical care team

## 2023-05-22 NOTE — PROGRESS NOTE ADULT - SUBJECTIVE AND OBJECTIVE BOX
· Subjective and Objective:   72y old  Male who presents with a chief complaint of CC: Septic Shock (17 May 2023 11:32)   off levo since 12 pm on 5/16 5/22 pt awake, denies abd pain, no sob, no CP, tolerating po, no diarrhea today  Allergies    No Known Allergies    Intolerances      REVIEW OF SYSTEMS:no sob, no abd pain, no vomiting    PHYSICAL EXAM:    Daily     Daily     ICU Vital Signs Last 24 Hrs  T(C): 36.4 (22 May 2023 08:06), Max: 37.6 (21 May 2023 15:55)  T(F): 97.6 (22 May 2023 08:06), Max: 99.7 (21 May 2023 15:55)  HR: 67 (22 May 2023 08:06) (60 - 79)  BP: 131/71 (22 May 2023 08:06) (117/63 - 131/71)  BP(mean): --  ABP: --  ABP(mean): --  RR: 18 (22 May 2023 08:06) (18 - 18)  SpO2: 95% (22 May 2023 08:06) (95% - 96%)    O2 Parameters below as of 22 May 2023 08:06  Patient On (Oxygen Delivery Method): room air  PHYSICAL EXAM:  Constitutional: nad  HEENT: Atraumatic, LEORA, Normal, No congestion  Respiratory: Breath Sounds normal, no rhonchi/wheeze  Cardiovascular: N S1S2;   Gastrointestinal: Abdomen soft, non tender, mildly distended, soft   Extremities: No edema, peripheral pulses present  Neurological: awake, follows1 step commands  Skin: Non cellulitic, no rash, ulcers    labs reviewed                                      9.8    8.08  )-----------( 330      ( 22 May 2023 06:44 )             30.0       CBC Full  -  ( 22 May 2023 06:44 )  WBC Count : 8.08 K/uL  RBC Count : 3.46 M/uL  Hemoglobin : 9.8 g/dL  Hematocrit : 30.0 %  Platelet Count - Automated : 330 K/uL  Mean Cell Volume : 86.7 fl  Mean Cell Hemoglobin : 28.3 pg  Mean Cell Hemoglobin Concentration : 32.7 gm/dL  Auto Neutrophil # : x  Auto Lymphocyte # : x  Auto Monocyte # : x  Auto Eosinophil # : x  Auto Basophil # : x  Auto Neutrophil % : x  Auto Lymphocyte % : x  Auto Monocyte % : x  Auto Eosinophil % : x  Auto Basophil % : x      05-21    141  |  114<H>  |  15  ----------------------------<  100<H>  3.5   |  21<L>  |  1.16    Ca    8.3<L>      21 May 2023 08:38    TPro  6.3  /  Alb  2.3<L>  /  TBili  1.2  /  DBili  x   /  AST  46<H>  /  ALT  55  /  AlkPhos  329<H>  05-21      LIVER FUNCTIONS - ( 21 May 2023 08:38 )  Alb: 2.3 g/dL / Pro: 6.3 gm/dL / ALK PHOS: 329 U/L / ALT: 55 U/L / AST: 46 U/L / GGT: x             PTT - ( 22 May 2023 06:44 )  PTT:45.0 sec                  MEDICATIONS  (STANDING):  atorvastatin 10 milliGRAM(s) Oral at bedtime  heparin  Infusion.  Unit(s)/Hr (11 mL/Hr) IV Continuous <Continuous>  metoprolol tartrate 12.5 milliGRAM(s) Oral every 12 hours  OLANZapine 5 milliGRAM(s) Oral daily  pantoprazole    Tablet 40 milliGRAM(s) Oral before breakfast  piperacillin/tazobactam IVPB.. 3.375 Gram(s) IV Intermittent every 8 hours  sodium chloride 0.45% with potassium chloride 20 mEq/L 1000 milliLiter(s) (50 mL/Hr) IV Continuous <Continuous>

## 2023-05-22 NOTE — PROGRESS NOTE ADULT - REASON FOR ADMISSION
CC: Septic Shock

## 2023-05-23 ENCOUNTER — TRANSCRIPTION ENCOUNTER (OUTPATIENT)
Age: 73
End: 2023-05-23

## 2023-05-23 VITALS
OXYGEN SATURATION: 97 % | TEMPERATURE: 98 F | DIASTOLIC BLOOD PRESSURE: 75 MMHG | RESPIRATION RATE: 18 BRPM | SYSTOLIC BLOOD PRESSURE: 115 MMHG | HEART RATE: 63 BPM

## 2023-05-23 LAB
ANION GAP SERPL CALC-SCNC: 6 MMOL/L — SIGNIFICANT CHANGE UP (ref 5–17)
APTT BLD: 78 SEC — HIGH (ref 27.5–35.5)
BASOPHILS # BLD AUTO: 0.05 K/UL — SIGNIFICANT CHANGE UP (ref 0–0.2)
BASOPHILS NFR BLD AUTO: 0.6 % — SIGNIFICANT CHANGE UP (ref 0–2)
BUN SERPL-MCNC: 9 MG/DL — SIGNIFICANT CHANGE UP (ref 7–23)
CALCIUM SERPL-MCNC: 8.9 MG/DL — SIGNIFICANT CHANGE UP (ref 8.5–10.1)
CHLORIDE SERPL-SCNC: 114 MMOL/L — HIGH (ref 96–108)
CO2 SERPL-SCNC: 23 MMOL/L — SIGNIFICANT CHANGE UP (ref 22–31)
CREAT SERPL-MCNC: 0.99 MG/DL — SIGNIFICANT CHANGE UP (ref 0.5–1.3)
EGFR: 80 ML/MIN/1.73M2 — SIGNIFICANT CHANGE UP
EOSINOPHIL # BLD AUTO: 0.36 K/UL — SIGNIFICANT CHANGE UP (ref 0–0.5)
EOSINOPHIL NFR BLD AUTO: 4.2 % — SIGNIFICANT CHANGE UP (ref 0–6)
FERRITIN SERPL-MCNC: 193 NG/ML — SIGNIFICANT CHANGE UP (ref 30–400)
GLUCOSE SERPL-MCNC: 99 MG/DL — SIGNIFICANT CHANGE UP (ref 70–99)
HCT VFR BLD CALC: 29.2 % — LOW (ref 39–50)
HGB BLD-MCNC: 9.7 G/DL — LOW (ref 13–17)
IMM GRANULOCYTES NFR BLD AUTO: 1.9 % — HIGH (ref 0–0.9)
IRON SATN MFR SERPL: 15 % — LOW (ref 16–55)
IRON SATN MFR SERPL: 37 UG/DL — LOW (ref 45–165)
LYMPHOCYTES # BLD AUTO: 1.23 K/UL — SIGNIFICANT CHANGE UP (ref 1–3.3)
LYMPHOCYTES # BLD AUTO: 14.3 % — SIGNIFICANT CHANGE UP (ref 13–44)
MCHC RBC-ENTMCNC: 28.6 PG — SIGNIFICANT CHANGE UP (ref 27–34)
MCHC RBC-ENTMCNC: 33.2 GM/DL — SIGNIFICANT CHANGE UP (ref 32–36)
MCV RBC AUTO: 86.1 FL — SIGNIFICANT CHANGE UP (ref 80–100)
MONOCYTES # BLD AUTO: 0.59 K/UL — SIGNIFICANT CHANGE UP (ref 0–0.9)
MONOCYTES NFR BLD AUTO: 6.9 % — SIGNIFICANT CHANGE UP (ref 2–14)
NEUTROPHILS # BLD AUTO: 6.19 K/UL — SIGNIFICANT CHANGE UP (ref 1.8–7.4)
NEUTROPHILS NFR BLD AUTO: 72.1 % — SIGNIFICANT CHANGE UP (ref 43–77)
OB PNL STL: NEGATIVE — SIGNIFICANT CHANGE UP
PLATELET # BLD AUTO: 386 K/UL — SIGNIFICANT CHANGE UP (ref 150–400)
POTASSIUM SERPL-MCNC: 3.1 MMOL/L — LOW (ref 3.5–5.3)
POTASSIUM SERPL-SCNC: 3.1 MMOL/L — LOW (ref 3.5–5.3)
RBC # BLD: 3.39 M/UL — LOW (ref 4.2–5.8)
RBC # BLD: 3.39 M/UL — LOW (ref 4.2–5.8)
RBC # FLD: 16.2 % — HIGH (ref 10.3–14.5)
RETICS #: 102.7 K/UL — SIGNIFICANT CHANGE UP (ref 25–125)
RETICS/RBC NFR: 3 % — HIGH (ref 0.5–2.5)
SODIUM SERPL-SCNC: 143 MMOL/L — SIGNIFICANT CHANGE UP (ref 135–145)
TIBC SERPL-MCNC: 253 UG/DL — SIGNIFICANT CHANGE UP (ref 220–430)
UIBC SERPL-MCNC: 216 UG/DL — SIGNIFICANT CHANGE UP (ref 110–370)
WBC # BLD: 8.58 K/UL — SIGNIFICANT CHANGE UP (ref 3.8–10.5)
WBC # FLD AUTO: 8.58 K/UL — SIGNIFICANT CHANGE UP (ref 3.8–10.5)

## 2023-05-23 PROCEDURE — 99239 HOSP IP/OBS DSCHRG MGMT >30: CPT

## 2023-05-23 RX ORDER — APIXABAN 2.5 MG/1
1 TABLET, FILM COATED ORAL
Qty: 60 | Refills: 0
Start: 2023-05-23

## 2023-05-23 RX ORDER — APIXABAN 2.5 MG/1
5 TABLET, FILM COATED ORAL EVERY 12 HOURS
Refills: 0 | Status: DISCONTINUED | OUTPATIENT
Start: 2023-05-23 | End: 2023-05-23

## 2023-05-23 RX ORDER — CEFUROXIME AXETIL 250 MG
1 TABLET ORAL
Qty: 14 | Refills: 0
Start: 2023-05-23

## 2023-05-23 RX ADMIN — HEPARIN SODIUM 1100 UNIT(S)/HR: 5000 INJECTION INTRAVENOUS; SUBCUTANEOUS at 03:26

## 2023-05-23 RX ADMIN — Medication 500 MILLIGRAM(S): at 11:01

## 2023-05-23 RX ADMIN — Medication 650 MILLIGRAM(S): at 07:22

## 2023-05-23 RX ADMIN — Medication 650 MILLIGRAM(S): at 06:02

## 2023-05-23 RX ADMIN — APIXABAN 5 MILLIGRAM(S): 2.5 TABLET, FILM COATED ORAL at 11:01

## 2023-05-23 RX ADMIN — PANTOPRAZOLE SODIUM 40 MILLIGRAM(S): 20 TABLET, DELAYED RELEASE ORAL at 06:00

## 2023-05-23 RX ADMIN — OLANZAPINE 5 MILLIGRAM(S): 15 TABLET, FILM COATED ORAL at 11:01

## 2023-05-23 RX ADMIN — HEPARIN SODIUM 1100 UNIT(S)/HR: 5000 INJECTION INTRAVENOUS; SUBCUTANEOUS at 07:20

## 2023-05-23 RX ADMIN — HEPARIN SODIUM 1100 UNIT(S)/HR: 5000 INJECTION INTRAVENOUS; SUBCUTANEOUS at 08:53

## 2023-05-23 RX ADMIN — Medication 12.5 MILLIGRAM(S): at 11:00

## 2023-05-23 NOTE — DISCHARGE NOTE PROVIDER - NSDCCPCAREPLAN_GEN_ALL_CORE_FT
PRINCIPAL DISCHARGE DIAGNOSIS  Diagnosis: Sepsis  Assessment and Plan of Treatment:       SECONDARY DISCHARGE DIAGNOSES  Diagnosis: Ascending cholangitis  Assessment and Plan of Treatment: follow up with surgery    Diagnosis: Elevated liver enzymes  Assessment and Plan of Treatment:     Diagnosis: Acute renal failure (ARF)  Assessment and Plan of Treatment:

## 2023-05-23 NOTE — PHYSICAL THERAPY INITIAL EVALUATION ADULT - NSPTDISCHREC_GEN_A_CORE
Pt was able to amb 200' independently w/o any AD, steady on feet, no c/o, Pt eager to go home, Pt does not require acute care PT, DC from PT, Pt was left sitting in chair/No skilled PT needs

## 2023-05-23 NOTE — PHYSICAL THERAPY INITIAL EVALUATION ADULT - PERTINENT HX OF CURRENT PROBLEM, REHAB EVAL
71 y/o male h/o HTN, gastritis, psych history was admitted on 5/15 for abdominal pain x 2 days associated vomiting, fever, chills. In ER he was noted hypotensive to 70's systolic. He was given 3 L of IV fluids in ED without improvement in BP. ICU consulted for hypotension refractory IV crystalloid. Sepsis with KLPN resolved. Probable acute cholecystitis and acute cholangitis with filling defect s/p prior biliary stent, s/p ERCP with sphincterotomy and stent placement

## 2023-05-23 NOTE — DISCHARGE NOTE PROVIDER - HOSPITAL COURSE
73 yo Male who presents with a chief complaint of CC: Septic Shock; treated with IV Abx and pressors in ICU   off levo since 12 pm on 5/16 ; transferred to medicine     5/22 pt awake, denies abd pain, no sob, no CP, tolerating po, no diarrhea today  5.23 :doing well, no distress, no abd pain, no bleeding    Vital Signs Last 24 Hrs  T(C): 37.3 (23 May 2023 08:21), Max: 37.9 (22 May 2023 23:08)  T(F): 99.1 (23 May 2023 08:21), Max: 100.2 (22 May 2023 23:08)  HR: 60 (23 May 2023 08:21) (60 - 67)  BP: 120/82 (23 May 2023 08:21) (105/59 - 120/82)  RR: 18 (23 May 2023 08:21) (18 - 20)  SpO2: 98% (23 May 2023 08:21) (94% - 98%)    Parameters below as of 23 May 2023 08:21  Patient On (Oxygen Delivery Method): room air    PHYSICAL EXAM:    GENERAL: NAD, Well nourished  HEENT:  NC/AT, EOMI, PERRLA, No scleral icterus, Moist mucous membranes  NECK: Supple, No JVD  CNS:  Alert & Oriented X3, Motor Strength 5/5 B/L upper and lower extremities; DTRs 2+ intact   LUNG: Normal Breath sounds, Clear to auscultation bilaterally, No rales, No rhonchi, No wheezing  HEART: RRR; No murmurs, No rubs  ABDOMEN: +BS, ST/ND/NT  GENITOURINARY- Voiding, Bladder not distended  EXTREMITIES:  2+ Peripheral Pulses, No clubbing, No cyanosis, No tibial edema  MUSCULOSKELTAL: Joints normal ROM, No joint tenderness, No muscle tenderness  RECTAL: deferred, not indicated  BREAST: deferred    a/p      1. Admitted for septic shock from acute cholangitis from an obstructing CBD stone   Klebsiella bacteremia   PV thrombosis   S/p ERCP with biliary stent placement on 5/15   s/p zosyn #6, switch to ceftin 500 BID for 8 more days to complete 14 day course- ID consult appreciated   no plan for in patient  cholecystostomy, plan for outpt cholecystectomy  Repeat BCx NGTD     2.HFpEF  TTE with diastolic dysfn, pulm HTN, will resume low dose BB     3. DVT  heme  recommended  to give Eliquis 5 mg BID without loading dose. as pt has already been on heparin for several days    4. ALYSSA resolved    dispo- dc home with home care pending anemia w/u ordered and GI clearance for switching to DOAC, discharge was deferred today due to further trending down of hb   I called son agatha unable to reach- no voicemail set up  informed RN, SW and patient

## 2023-05-23 NOTE — DISCHARGE NOTE NURSING/CASE MANAGEMENT/SOCIAL WORK - PATIENT PORTAL LINK FT
You can access the FollowMyHealth Patient Portal offered by [facility] by registering at the following website: [email or web address]. By joining [employer] FollowMyHealth portal, you will also be able to view your health information using other applications (apps) compatible with our system.

## 2023-05-23 NOTE — DISCHARGE NOTE NURSING/CASE MANAGEMENT/SOCIAL WORK - WAS YOUR LAST COVID-19 VACCINE GREATER THAN OR EQUAL TO TWO MONTHS AGO?
Discussed with Nicole that patient does not wish to schedule at this time due to a billing concern. Per Dr. Oneil monte to order Cardiolite stress test as this is important. He should contact billing for further concerns.   Yes

## 2023-05-23 NOTE — PHYSICAL THERAPY INITIAL EVALUATION ADULT - ADDITIONAL COMMENTS
Patient has rented a room in a 2 family home with multiple tenants for the past 9 years. Standard staircase up to second floor.

## 2023-05-23 NOTE — DISCHARGE NOTE PROVIDER - NSDCMRMEDTOKEN_GEN_ALL_CORE_FT
amLODIPine 5 mg oral tablet: 1 tab(s) orally once a day  apixaban 5 mg oral tablet: 1 tab(s) orally every 12 hours  atorvastatin 10 mg oral tablet: 1 tab(s) orally once a day at bedtime  cefuroxime 500 mg oral tablet: 1 tab(s) orally every 12 hours  fluticasone 50 mcg/inh nasal spray: 1 spray(s) in each nostril once a day, As Needed  metoprolol tartrate 25 mg oral tablet: 0.5 tab(s) orally 2 times a day   OLANZapine 5 mg oral tablet: 1 tab(s) orally once a day (at bedtime)  omeprazole 40 mg oral delayed release capsule: 1 cap(s) orally once a day

## 2023-05-23 NOTE — DISCHARGE NOTE NURSING/CASE MANAGEMENT/SOCIAL WORK - NSDCVIVACCINE_GEN_ALL_CORE_FT
influenza, injectable, quadrivalent, preservative free; 04-Sep-2019 12:23; Miryam Ma (RN); Quibly; [id number] (Exp. Date: 30-Jun-2020); IntraMuscular; Deltoid Left.; 0.5 milliLiter(s); VIS (VIS Published: 15-Aug-2019, VIS Presented: 04-Sep-2019);

## 2023-05-30 DIAGNOSIS — I11.0 HYPERTENSIVE HEART DISEASE WITH HEART FAILURE: ICD-10-CM

## 2023-05-30 DIAGNOSIS — N17.9 ACUTE KIDNEY FAILURE, UNSPECIFIED: ICD-10-CM

## 2023-05-30 DIAGNOSIS — N39.0 URINARY TRACT INFECTION, SITE NOT SPECIFIED: ICD-10-CM

## 2023-05-30 DIAGNOSIS — E78.5 HYPERLIPIDEMIA, UNSPECIFIED: ICD-10-CM

## 2023-05-30 DIAGNOSIS — I50.30 UNSPECIFIED DIASTOLIC (CONGESTIVE) HEART FAILURE: ICD-10-CM

## 2023-05-30 DIAGNOSIS — I81 PORTAL VEIN THROMBOSIS: ICD-10-CM

## 2023-05-30 DIAGNOSIS — I27.20 PULMONARY HYPERTENSION, UNSPECIFIED: ICD-10-CM

## 2023-05-30 DIAGNOSIS — R65.21 SEVERE SEPSIS WITH SEPTIC SHOCK: ICD-10-CM

## 2023-05-30 DIAGNOSIS — K80.33 CALCULUS OF BILE DUCT WITH ACUTE CHOLANGITIS WITH OBSTRUCTION: ICD-10-CM

## 2023-05-30 DIAGNOSIS — A41.59 OTHER GRAM-NEGATIVE SEPSIS: ICD-10-CM

## 2023-07-02 ENCOUNTER — OUTPATIENT (OUTPATIENT)
Dept: OUTPATIENT SERVICES | Facility: HOSPITAL | Age: 73
LOS: 1 days | Discharge: HOME | End: 2023-07-02

## 2023-07-02 DIAGNOSIS — A41.9 SEPSIS, UNSPECIFIED ORGANISM: ICD-10-CM

## 2023-07-05 ENCOUNTER — APPOINTMENT (OUTPATIENT)
Dept: GASTROENTEROLOGY | Facility: CLINIC | Age: 73
End: 2023-07-05
Payer: MEDICARE

## 2023-07-05 VITALS
SYSTOLIC BLOOD PRESSURE: 125 MMHG | HEIGHT: 61 IN | WEIGHT: 126 LBS | BODY MASS INDEX: 23.79 KG/M2 | DIASTOLIC BLOOD PRESSURE: 76 MMHG | HEART RATE: 65 BPM

## 2023-07-05 DIAGNOSIS — K83.9 DISEASE OF BILIARY TRACT, UNSPECIFIED: ICD-10-CM

## 2023-07-05 PROCEDURE — 99214 OFFICE O/P EST MOD 30 MIN: CPT

## 2023-07-09 DIAGNOSIS — I27.20 PULMONARY HYPERTENSION, UNSPECIFIED: ICD-10-CM

## 2023-07-09 DIAGNOSIS — Z98.890 OTHER SPECIFIED POSTPROCEDURAL STATES: ICD-10-CM

## 2023-07-09 DIAGNOSIS — Z86.19 PERSONAL HISTORY OF OTHER INFECTIOUS AND PARASITIC DISEASES: ICD-10-CM

## 2023-07-09 DIAGNOSIS — Z87.19 PERSONAL HISTORY OF OTHER DISEASES OF THE DIGESTIVE SYSTEM: ICD-10-CM

## 2023-07-09 RX ORDER — OMEPRAZOLE 20 MG/1
20 CAPSULE, DELAYED RELEASE ORAL
Qty: 90 | Refills: 0 | Status: DISCONTINUED | COMMUNITY
Start: 2023-04-25 | End: 2023-07-09

## 2023-07-09 RX ORDER — CEFUROXIME AXETIL 500 MG/1
500 TABLET ORAL
Qty: 14 | Refills: 0 | Status: DISCONTINUED | COMMUNITY
Start: 2023-05-23 | End: 2023-07-09

## 2023-07-10 ENCOUNTER — RESULT REVIEW (OUTPATIENT)
Age: 73
End: 2023-07-10

## 2023-07-10 ENCOUNTER — APPOINTMENT (OUTPATIENT)
Dept: HEMATOLOGY ONCOLOGY | Facility: CLINIC | Age: 73
End: 2023-07-10

## 2023-07-10 ENCOUNTER — LABORATORY RESULT (OUTPATIENT)
Age: 73
End: 2023-07-10

## 2023-07-10 LAB
ALBUMIN SERPL ELPH-MCNC: 4.6 G/DL — SIGNIFICANT CHANGE UP (ref 3.3–5)
ALP SERPL-CCNC: 106 U/L — SIGNIFICANT CHANGE UP (ref 40–120)
ALT FLD-CCNC: 18 U/L — SIGNIFICANT CHANGE UP (ref 10–45)
ANION GAP SERPL CALC-SCNC: 12 MMOL/L — SIGNIFICANT CHANGE UP (ref 5–17)
APTT BLD: 35.9 SEC — HIGH (ref 27.5–35.5)
APTT BLD: 35.9 SEC — HIGH (ref 27.5–35.5)
AST SERPL-CCNC: 28 U/L — SIGNIFICANT CHANGE UP (ref 10–40)
BASOPHILS # BLD AUTO: 0.06 K/UL — SIGNIFICANT CHANGE UP (ref 0–0.2)
BASOPHILS NFR BLD AUTO: 0.8 % — SIGNIFICANT CHANGE UP (ref 0–2)
BILIRUB SERPL-MCNC: 0.3 MG/DL — SIGNIFICANT CHANGE UP (ref 0.2–1.2)
BUN SERPL-MCNC: 13 MG/DL — SIGNIFICANT CHANGE UP (ref 7–23)
CALCIUM SERPL-MCNC: 9.9 MG/DL — SIGNIFICANT CHANGE UP (ref 8.4–10.5)
CHLORIDE SERPL-SCNC: 99 MMOL/L — SIGNIFICANT CHANGE UP (ref 96–108)
CO2 SERPL-SCNC: 32 MMOL/L — HIGH (ref 22–31)
CREAT SERPL-MCNC: 1.25 MG/DL — SIGNIFICANT CHANGE UP (ref 0.5–1.3)
EGFR: 61 ML/MIN/1.73M2 — SIGNIFICANT CHANGE UP
EOSINOPHIL # BLD AUTO: 0.24 K/UL — SIGNIFICANT CHANGE UP (ref 0–0.5)
EOSINOPHIL NFR BLD AUTO: 3.3 % — SIGNIFICANT CHANGE UP (ref 0–6)
GLUCOSE SERPL-MCNC: 100 MG/DL — HIGH (ref 70–99)
HCT VFR BLD CALC: 37 % — LOW (ref 39–50)
HGB BLD-MCNC: 12.4 G/DL — LOW (ref 13–17)
IMM GRANULOCYTES NFR BLD AUTO: 0.3 % — SIGNIFICANT CHANGE UP (ref 0–0.9)
LYMPHOCYTES # BLD AUTO: 1.14 K/UL — SIGNIFICANT CHANGE UP (ref 1–3.3)
LYMPHOCYTES # BLD AUTO: 15.7 % — SIGNIFICANT CHANGE UP (ref 13–44)
MCHC RBC-ENTMCNC: 28.8 PG — SIGNIFICANT CHANGE UP (ref 27–34)
MCHC RBC-ENTMCNC: 33.5 GM/DL — SIGNIFICANT CHANGE UP (ref 32–36)
MCV RBC AUTO: 85.8 FL — SIGNIFICANT CHANGE UP (ref 80–100)
MONOCYTES # BLD AUTO: 0.49 K/UL — SIGNIFICANT CHANGE UP (ref 0–0.9)
MONOCYTES NFR BLD AUTO: 6.7 % — SIGNIFICANT CHANGE UP (ref 2–14)
NEUTROPHILS # BLD AUTO: 5.31 K/UL — SIGNIFICANT CHANGE UP (ref 1.8–7.4)
NEUTROPHILS NFR BLD AUTO: 73.2 % — SIGNIFICANT CHANGE UP (ref 43–77)
NRBC # BLD: 0 /100 WBCS — SIGNIFICANT CHANGE UP (ref 0–0)
PLATELET # BLD AUTO: 373 K/UL — SIGNIFICANT CHANGE UP (ref 150–400)
POTASSIUM SERPL-MCNC: 3.2 MMOL/L — LOW (ref 3.5–5.3)
POTASSIUM SERPL-SCNC: 3.2 MMOL/L — LOW (ref 3.5–5.3)
PROT SERPL-MCNC: 7.8 G/DL — SIGNIFICANT CHANGE UP (ref 6–8.3)
RBC # BLD: 4.31 M/UL — SIGNIFICANT CHANGE UP (ref 4.2–5.8)
RBC # FLD: 14.3 % — SIGNIFICANT CHANGE UP (ref 10.3–14.5)
SODIUM SERPL-SCNC: 143 MMOL/L — SIGNIFICANT CHANGE UP (ref 135–145)
WBC # BLD: 7.26 K/UL — SIGNIFICANT CHANGE UP (ref 3.8–10.5)
WBC # FLD AUTO: 7.26 K/UL — SIGNIFICANT CHANGE UP (ref 3.8–10.5)

## 2023-07-11 PROBLEM — K83.9 DISEASE OF BILE DUCT: Status: ACTIVE | Noted: 2023-07-11

## 2023-07-11 LAB
APTT 50/50 2HOUR INCUB: 37.9 SEC — HIGH (ref 27.5–36.5)
APTT BLD: 36.2 SEC — SIGNIFICANT CHANGE UP (ref 27.5–36.5)
AT III ACT/NOR PPP CHRO: 198 % — HIGH (ref 85–135)
B2 GLYCOPROT1 AB SER QL: NEGATIVE — SIGNIFICANT CHANGE UP
CARDIOLIPIN AB SER-ACNC: NEGATIVE — SIGNIFICANT CHANGE UP
DRVVT RATIO: 0.59 RATIO — SIGNIFICANT CHANGE UP (ref 0–1.21)
DRVVT SCREEN TO CONFIRM RATIO: SIGNIFICANT CHANGE UP
NORMALIZED SCT PPP-RTO: 0.83 RATIO — SIGNIFICANT CHANGE UP (ref 0–1.16)
NORMALIZED SCT PPP-RTO: SIGNIFICANT CHANGE UP
PAT CTL 2H: 37.9 SEC — HIGH (ref 27.5–36.5)
PROT C ACT/NOR PPP: 87 % — SIGNIFICANT CHANGE UP (ref 74–150)
PROT S FREE AG PPP IA-ACNC: 101 % — SIGNIFICANT CHANGE UP (ref 67–141)

## 2023-07-11 NOTE — ASSESSMENT
[FreeTextEntry1] : 72 year old man with recent cholangitis and septic shock, s/p ERCP and stent placement, here for follow up. \par \par Will need ERCP and stent removal. \par Will facilitate evaluation by surgery for GB removal. \par Will need eliquis held for procedure, can di/w Dr. Owen.

## 2023-07-11 NOTE — REVIEW OF SYSTEMS
[As Noted in HPI] : as noted in HPI [Negative] : Heme/Lymph [FreeTextEntry7] : cholangitis/septic shock, now feeling well

## 2023-07-11 NOTE — HISTORY OF PRESENT ILLNESS
[FreeTextEntry1] : 72 year old man with recent cholangitis and septic shock, s/p ERCP and stent placement, here for follow up. \par \par Since discharge patient feels well. No abdominal pain. No post prandial symptoms. No jaundice. Good appetite. No fevers or chills. No overt signs of Gi bleeding like hematemesis, melena, hematochezia. No weight loss. Never followed up for his GB removal. \par

## 2023-07-11 NOTE — PHYSICAL EXAM
[Alert] : alert [Normal Voice/Communication] : normal voice/communication [No Acute Distress] : no acute distress [Sclera] : the sclera and conjunctiva were normal [Hearing Threshold Finger Rub Not Mellette] : hearing was normal [Normal Lips/Gums] : the lips and gums were normal [Oropharynx] : the oropharynx was normal [Normal Appearance] : the appearance of the neck was normal [No Neck Mass] : no neck mass was observed [Abdomen Tenderness] : non-tender [No Masses] : no abdominal mass palpated [Abdomen Soft] : soft [Abnormal Walk] : normal gait [No Clubbing, Cyanosis] : no clubbing or cyanosis of the fingernails [Normal Color / Pigmentation] : normal skin color and pigmentation [] : no rash [Skin Lesions] : no skin lesions [No Focal Deficits] : no focal deficits [Motor Exam] : the motor exam was normal [Oriented To Time, Place, And Person] : oriented to person, place, and time normal

## 2023-07-18 LAB — CONFIRM APTT STACLOT: NEGATIVE — SIGNIFICANT CHANGE UP

## 2023-08-07 ENCOUNTER — APPOINTMENT (OUTPATIENT)
Dept: HEMATOLOGY ONCOLOGY | Facility: CLINIC | Age: 73
End: 2023-08-07
Payer: MEDICARE

## 2023-08-07 ENCOUNTER — NON-APPOINTMENT (OUTPATIENT)
Age: 73
End: 2023-08-07

## 2023-08-07 VITALS
RESPIRATION RATE: 19 BRPM | BODY MASS INDEX: 23.84 KG/M2 | TEMPERATURE: 98.3 F | OXYGEN SATURATION: 98 % | DIASTOLIC BLOOD PRESSURE: 70 MMHG | SYSTOLIC BLOOD PRESSURE: 109 MMHG | HEART RATE: 75 BPM | WEIGHT: 126.19 LBS

## 2023-08-07 DIAGNOSIS — I81 PORTAL VEIN THROMBOSIS: ICD-10-CM

## 2023-08-07 PROCEDURE — 99215 OFFICE O/P EST HI 40 MIN: CPT

## 2023-08-07 NOTE — ASSESSMENT
[FreeTextEntry1] : 71 y/o male with right intrahepatic portal vein thrombosis diagnosed  5/15/23 when he was admitted to [facility] for acute cholangitis 2/2 obstructing CBD stone/ Klebsiella bacteremia. I reviewed hospital records.  Portal vein thrombosis secondary to acute cholangitis. Laboratory comprehensive thrombophilia panel done here   is negative. Recommend anticoagulation for a total 6 months ( until mid November 2023). No need for extended/ long term anticoagulation- as no permanent risk factors.  Will call and find out re Procedure planned for 8/10- might need to stop Eliquis prior to procedure.   Addendum : Spoke with NP from Dr Beltre office. Patient is scheduled for ERCP with Dr Beltre at  8/10/23. Plan to stop Eliquis two days before procedure. Resume Eliquis after procedure per Dr Beltre. Will call patient and explain above.    CC: Dr Bushra Rodrigez

## 2023-08-07 NOTE — HISTORY OF PRESENT ILLNESS
[de-identified] :  admission to ICU 5/15/23-5/23/23 for septic shock from acute cholangitis 2/2 obstructing CBD stone; Klebsiella bacteremia.  Also diagnosed with PV thrombosis- right intrahepatic branch.  SMV and splenic vein patent.  s/p ERCP biliary stent 5/15. Plan for outpt cholecystectomy at some point. On Eliquis 5 mg bid for PV thrombosis.   Feels much better Abd pain resolved.  Continues Eliquis.  Scheduled for " surgery " on 8/10/23 - but does not know details of procedure and he thinks he can continue all his medications until day of the procedure.

## 2023-08-07 NOTE — PHYSICAL EXAM
[Normal] : affect appropriate [de-identified] : looks well  [de-identified] : soft and non tender

## 2023-08-07 NOTE — RESULTS/DATA
[FreeTextEntry1] : Negative for Factor V Leiden and prothrombin gene mutation JAK2   mutation not detected

## 2023-08-08 ENCOUNTER — OUTPATIENT (OUTPATIENT)
Dept: OUTPATIENT SERVICES | Facility: HOSPITAL | Age: 73
LOS: 1 days | End: 2023-08-08
Payer: MEDICARE

## 2023-08-08 ENCOUNTER — EMERGENCY (EMERGENCY)
Facility: HOSPITAL | Age: 73
LOS: 0 days | Discharge: HOME | End: 2023-08-08
Attending: STUDENT IN AN ORGANIZED HEALTH CARE EDUCATION/TRAINING PROGRAM
Payer: MEDICARE

## 2023-08-08 VITALS
HEART RATE: 82 BPM | TEMPERATURE: 99 F | SYSTOLIC BLOOD PRESSURE: 123 MMHG | DIASTOLIC BLOOD PRESSURE: 82 MMHG | RESPIRATION RATE: 18 BRPM | OXYGEN SATURATION: 98 %

## 2023-08-08 VITALS — HEIGHT: 61 IN | WEIGHT: 123.02 LBS

## 2023-08-08 VITALS
HEIGHT: 61 IN | HEART RATE: 63 BPM | TEMPERATURE: 98 F | OXYGEN SATURATION: 100 % | RESPIRATION RATE: 18 BRPM | WEIGHT: 123.02 LBS | DIASTOLIC BLOOD PRESSURE: 83 MMHG | SYSTOLIC BLOOD PRESSURE: 124 MMHG

## 2023-08-08 DIAGNOSIS — I10 ESSENTIAL (PRIMARY) HYPERTENSION: ICD-10-CM

## 2023-08-08 DIAGNOSIS — E87.6 HYPOKALEMIA: ICD-10-CM

## 2023-08-08 DIAGNOSIS — K21.9 GASTRO-ESOPHAGEAL REFLUX DISEASE WITHOUT ESOPHAGITIS: ICD-10-CM

## 2023-08-08 DIAGNOSIS — Z86.718 PERSONAL HISTORY OF OTHER VENOUS THROMBOSIS AND EMBOLISM: ICD-10-CM

## 2023-08-08 DIAGNOSIS — Z86.59 PERSONAL HISTORY OF OTHER MENTAL AND BEHAVIORAL DISORDERS: ICD-10-CM

## 2023-08-08 DIAGNOSIS — Z98.890 OTHER SPECIFIED POSTPROCEDURAL STATES: Chronic | ICD-10-CM

## 2023-08-08 DIAGNOSIS — Z96.89 PRESENCE OF OTHER SPECIFIED FUNCTIONAL IMPLANTS: ICD-10-CM

## 2023-08-08 DIAGNOSIS — F20.9 SCHIZOPHRENIA, UNSPECIFIED: ICD-10-CM

## 2023-08-08 DIAGNOSIS — F03.90 UNSPECIFIED DEMENTIA, UNSPECIFIED SEVERITY, WITHOUT BEHAVIORAL DISTURBANCE, PSYCHOTIC DISTURBANCE, MOOD DISTURBANCE, AND ANXIETY: ICD-10-CM

## 2023-08-08 DIAGNOSIS — Z98.890 OTHER SPECIFIED POSTPROCEDURAL STATES: ICD-10-CM

## 2023-08-08 LAB
ALBUMIN SERPL ELPH-MCNC: 3.9 G/DL — SIGNIFICANT CHANGE UP (ref 3.3–5)
ALP SERPL-CCNC: 91 U/L — SIGNIFICANT CHANGE UP (ref 40–120)
ALT FLD-CCNC: 17 U/L — SIGNIFICANT CHANGE UP (ref 12–78)
ANION GAP SERPL CALC-SCNC: 3 MMOL/L — LOW (ref 5–17)
ANION GAP SERPL CALC-SCNC: 5 MMOL/L — SIGNIFICANT CHANGE UP (ref 5–17)
APTT BLD: 34.4 SEC — SIGNIFICANT CHANGE UP (ref 24.5–35.6)
AST SERPL-CCNC: 21 U/L — SIGNIFICANT CHANGE UP (ref 15–37)
BASOPHILS # BLD AUTO: 0.04 K/UL — SIGNIFICANT CHANGE UP (ref 0–0.2)
BASOPHILS # BLD AUTO: 0.05 K/UL — SIGNIFICANT CHANGE UP (ref 0–0.2)
BASOPHILS NFR BLD AUTO: 0.5 % — SIGNIFICANT CHANGE UP (ref 0–2)
BASOPHILS NFR BLD AUTO: 0.6 % — SIGNIFICANT CHANGE UP (ref 0–2)
BILIRUB SERPL-MCNC: 0.3 MG/DL — SIGNIFICANT CHANGE UP (ref 0.2–1.2)
BUN SERPL-MCNC: 10 MG/DL — SIGNIFICANT CHANGE UP (ref 7–23)
BUN SERPL-MCNC: 15 MG/DL — SIGNIFICANT CHANGE UP (ref 7–23)
CALCIUM SERPL-MCNC: 8.8 MG/DL — SIGNIFICANT CHANGE UP (ref 8.5–10.1)
CALCIUM SERPL-MCNC: 9.3 MG/DL — SIGNIFICANT CHANGE UP (ref 8.5–10.1)
CHLORIDE SERPL-SCNC: 106 MMOL/L — SIGNIFICANT CHANGE UP (ref 96–108)
CHLORIDE SERPL-SCNC: 107 MMOL/L — SIGNIFICANT CHANGE UP (ref 96–108)
CO2 SERPL-SCNC: 30 MMOL/L — SIGNIFICANT CHANGE UP (ref 22–31)
CO2 SERPL-SCNC: 34 MMOL/L — HIGH (ref 22–31)
CREAT SERPL-MCNC: 1.12 MG/DL — SIGNIFICANT CHANGE UP (ref 0.5–1.3)
CREAT SERPL-MCNC: 1.17 MG/DL — SIGNIFICANT CHANGE UP (ref 0.5–1.3)
EGFR: 66 ML/MIN/1.73M2 — SIGNIFICANT CHANGE UP
EGFR: 69 ML/MIN/1.73M2 — SIGNIFICANT CHANGE UP
EOSINOPHIL # BLD AUTO: 0.26 K/UL — SIGNIFICANT CHANGE UP (ref 0–0.5)
EOSINOPHIL # BLD AUTO: 0.32 K/UL — SIGNIFICANT CHANGE UP (ref 0–0.5)
EOSINOPHIL NFR BLD AUTO: 3.1 % — SIGNIFICANT CHANGE UP (ref 0–6)
EOSINOPHIL NFR BLD AUTO: 3.7 % — SIGNIFICANT CHANGE UP (ref 0–6)
GLUCOSE SERPL-MCNC: 102 MG/DL — HIGH (ref 70–99)
GLUCOSE SERPL-MCNC: 108 MG/DL — HIGH (ref 70–99)
HCT VFR BLD CALC: 35.9 % — LOW (ref 39–50)
HCT VFR BLD CALC: 40.3 % — SIGNIFICANT CHANGE UP (ref 39–50)
HGB BLD-MCNC: 11.7 G/DL — LOW (ref 13–17)
HGB BLD-MCNC: 13 G/DL — SIGNIFICANT CHANGE UP (ref 13–17)
IMM GRANULOCYTES NFR BLD AUTO: 0.2 % — SIGNIFICANT CHANGE UP (ref 0–0.9)
IMM GRANULOCYTES NFR BLD AUTO: 0.3 % — SIGNIFICANT CHANGE UP (ref 0–0.9)
INR BLD: 1.38 RATIO — HIGH (ref 0.85–1.18)
LYMPHOCYTES # BLD AUTO: 1.38 K/UL — SIGNIFICANT CHANGE UP (ref 1–3.3)
LYMPHOCYTES # BLD AUTO: 1.45 K/UL — SIGNIFICANT CHANGE UP (ref 1–3.3)
LYMPHOCYTES # BLD AUTO: 15.9 % — SIGNIFICANT CHANGE UP (ref 13–44)
LYMPHOCYTES # BLD AUTO: 17.4 % — SIGNIFICANT CHANGE UP (ref 13–44)
MAGNESIUM SERPL-MCNC: 2.1 MG/DL — SIGNIFICANT CHANGE UP (ref 1.6–2.6)
MCHC RBC-ENTMCNC: 27.7 PG — SIGNIFICANT CHANGE UP (ref 27–34)
MCHC RBC-ENTMCNC: 27.8 PG — SIGNIFICANT CHANGE UP (ref 27–34)
MCHC RBC-ENTMCNC: 32.3 GM/DL — SIGNIFICANT CHANGE UP (ref 32–36)
MCHC RBC-ENTMCNC: 32.6 GM/DL — SIGNIFICANT CHANGE UP (ref 32–36)
MCV RBC AUTO: 85.1 FL — SIGNIFICANT CHANGE UP (ref 80–100)
MCV RBC AUTO: 86.1 FL — SIGNIFICANT CHANGE UP (ref 80–100)
MONOCYTES # BLD AUTO: 0.58 K/UL — SIGNIFICANT CHANGE UP (ref 0–0.9)
MONOCYTES # BLD AUTO: 0.6 K/UL — SIGNIFICANT CHANGE UP (ref 0–0.9)
MONOCYTES NFR BLD AUTO: 6.9 % — SIGNIFICANT CHANGE UP (ref 2–14)
MONOCYTES NFR BLD AUTO: 7 % — SIGNIFICANT CHANGE UP (ref 2–14)
NEUTROPHILS # BLD AUTO: 5.98 K/UL — SIGNIFICANT CHANGE UP (ref 1.8–7.4)
NEUTROPHILS # BLD AUTO: 6.32 K/UL — SIGNIFICANT CHANGE UP (ref 1.8–7.4)
NEUTROPHILS NFR BLD AUTO: 71.8 % — SIGNIFICANT CHANGE UP (ref 43–77)
NEUTROPHILS NFR BLD AUTO: 72.6 % — SIGNIFICANT CHANGE UP (ref 43–77)
PLATELET # BLD AUTO: 278 K/UL — SIGNIFICANT CHANGE UP (ref 150–400)
PLATELET # BLD AUTO: 314 K/UL — SIGNIFICANT CHANGE UP (ref 150–400)
POTASSIUM SERPL-MCNC: 2.7 MMOL/L — CRITICAL LOW (ref 3.5–5.3)
POTASSIUM SERPL-MCNC: 2.8 MMOL/L — CRITICAL LOW (ref 3.5–5.3)
POTASSIUM SERPL-MCNC: 3.2 MMOL/L — LOW (ref 3.5–5.3)
POTASSIUM SERPL-SCNC: 2.7 MMOL/L — CRITICAL LOW (ref 3.5–5.3)
POTASSIUM SERPL-SCNC: 2.8 MMOL/L — CRITICAL LOW (ref 3.5–5.3)
POTASSIUM SERPL-SCNC: 3.2 MMOL/L — LOW (ref 3.5–5.3)
PROT SERPL-MCNC: 7.8 GM/DL — SIGNIFICANT CHANGE UP (ref 6–8.3)
PROTHROM AB SERPL-ACNC: 15.4 SEC — HIGH (ref 9.5–13)
RBC # BLD: 4.22 M/UL — SIGNIFICANT CHANGE UP (ref 4.2–5.8)
RBC # BLD: 4.68 M/UL — SIGNIFICANT CHANGE UP (ref 4.2–5.8)
RBC # FLD: 14.5 % — SIGNIFICANT CHANGE UP (ref 10.3–14.5)
RBC # FLD: 14.6 % — HIGH (ref 10.3–14.5)
SODIUM SERPL-SCNC: 142 MMOL/L — SIGNIFICANT CHANGE UP (ref 135–145)
SODIUM SERPL-SCNC: 143 MMOL/L — SIGNIFICANT CHANGE UP (ref 135–145)
WBC # BLD: 8.33 K/UL — SIGNIFICANT CHANGE UP (ref 3.8–10.5)
WBC # BLD: 8.7 K/UL — SIGNIFICANT CHANGE UP (ref 3.8–10.5)
WBC # FLD AUTO: 8.33 K/UL — SIGNIFICANT CHANGE UP (ref 3.8–10.5)
WBC # FLD AUTO: 8.7 K/UL — SIGNIFICANT CHANGE UP (ref 3.8–10.5)

## 2023-08-08 PROCEDURE — 80053 COMPREHEN METABOLIC PANEL: CPT

## 2023-08-08 PROCEDURE — 85025 COMPLETE CBC W/AUTO DIFF WBC: CPT

## 2023-08-08 PROCEDURE — 80048 BASIC METABOLIC PNL TOTAL CA: CPT

## 2023-08-08 PROCEDURE — 96374 THER/PROPH/DIAG INJ IV PUSH: CPT

## 2023-08-08 PROCEDURE — 99284 EMERGENCY DEPT VISIT MOD MDM: CPT | Mod: 25

## 2023-08-08 PROCEDURE — 93005 ELECTROCARDIOGRAM TRACING: CPT

## 2023-08-08 PROCEDURE — 93010 ELECTROCARDIOGRAM REPORT: CPT

## 2023-08-08 PROCEDURE — 85610 PROTHROMBIN TIME: CPT

## 2023-08-08 PROCEDURE — 36415 COLL VENOUS BLD VENIPUNCTURE: CPT

## 2023-08-08 PROCEDURE — 84132 ASSAY OF SERUM POTASSIUM: CPT

## 2023-08-08 PROCEDURE — 99214 OFFICE O/P EST MOD 30 MIN: CPT | Mod: 25

## 2023-08-08 PROCEDURE — 83735 ASSAY OF MAGNESIUM: CPT

## 2023-08-08 PROCEDURE — 96375 TX/PRO/DX INJ NEW DRUG ADDON: CPT

## 2023-08-08 PROCEDURE — 85730 THROMBOPLASTIN TIME PARTIAL: CPT

## 2023-08-08 PROCEDURE — 96376 TX/PRO/DX INJ SAME DRUG ADON: CPT

## 2023-08-08 PROCEDURE — 99285 EMERGENCY DEPT VISIT HI MDM: CPT

## 2023-08-08 RX ORDER — POTASSIUM CHLORIDE 20 MEQ
40 PACKET (EA) ORAL ONCE
Refills: 0 | Status: COMPLETED | OUTPATIENT
Start: 2023-08-08 | End: 2023-08-08

## 2023-08-08 RX ORDER — SODIUM CHLORIDE 9 MG/ML
1000 INJECTION INTRAMUSCULAR; INTRAVENOUS; SUBCUTANEOUS ONCE
Refills: 0 | Status: COMPLETED | OUTPATIENT
Start: 2023-08-08 | End: 2023-08-08

## 2023-08-08 RX ORDER — ATORVASTATIN CALCIUM 80 MG/1
1 TABLET, FILM COATED ORAL
Refills: 0 | DISCHARGE

## 2023-08-08 RX ORDER — POTASSIUM CHLORIDE 20 MEQ
10 PACKET (EA) ORAL
Refills: 0 | Status: COMPLETED | OUTPATIENT
Start: 2023-08-08 | End: 2023-08-08

## 2023-08-08 RX ADMIN — SODIUM CHLORIDE 1000 MILLILITER(S): 9 INJECTION INTRAMUSCULAR; INTRAVENOUS; SUBCUTANEOUS at 17:44

## 2023-08-08 RX ADMIN — Medication 100 MILLIEQUIVALENT(S): at 17:45

## 2023-08-08 RX ADMIN — Medication 40 MILLIEQUIVALENT(S): at 17:47

## 2023-08-08 RX ADMIN — Medication 100 MILLIEQUIVALENT(S): at 18:42

## 2023-08-08 RX ADMIN — Medication 100 MILLIEQUIVALENT(S): at 19:35

## 2023-08-08 NOTE — ED STATDOCS - NSICDXPASTSURGICALHX_GEN_ALL_CORE_FT
PAST SURGICAL HISTORY:  H/O cervical spine surgery     History of biliary stent insertion     S/P ERCP     S/P exploratory laparotomy

## 2023-08-08 NOTE — ED STATDOCS - NSICDXPASTMEDICALHX_GEN_ALL_CORE_FT
PAST MEDICAL HISTORY:  Alcohol abuse     Dementia     Drug abuse     GERD (gastroesophageal reflux disease)     History of acute cholangitis     History of cervical fracture     History of gastric ulcer     HTN (hypertension)     PVT (portal vein thrombosis)     Schizophrenia

## 2023-08-08 NOTE — ED STATDOCS - PROGRESS NOTE DETAILS
signed Malika Hooks PA-C Pt seen initially in intake by Dr Campvoerde.   72M sent in by NP from Dr Beltre' office for K 2.7 on preop labwork. Pt scheduled for procedure on 8/10. BAseline K 3.1. PLan labs and replete PRN. Pt agrees with plan of  care.

## 2023-08-08 NOTE — H&P PST ADULT - ASSESSMENT
Plan:  1. NPO post midnight  2. Follow preop medication instructions from Dr. Beltre  3. Labs, EKG as per Dr. Beltre   73 y/o male with CBD stone, S/P ERCP with stent placed in 05/2023, PMH of PV thrombosis, GERD, HTN, schizophrenia. Pt reports occasional nausea, denies abdominal pain. He is scheduled for ERCP, stent pull.   Plan:  1. NPO post midnight  2. Follow preop medication instructions (including Apixaban) from Dr. Beltre  3. Labs, EKG as per Dr. Beltre

## 2023-08-08 NOTE — H&P PST ADULT - NSICDXPASTMEDICALHX_GEN_ALL_CORE_FT
PAST MEDICAL HISTORY:  Alcohol abuse     Dementia     Drug abuse     Schizophrenia      PAST MEDICAL HISTORY:  Alcohol abuse     Dementia     Drug abuse     GERD (gastroesophageal reflux disease)     History of cervical fracture     History of gastric ulcer     HTN (hypertension)     PVT (portal vein thrombosis)     Schizophrenia      PAST MEDICAL HISTORY:  Alcohol abuse     Dementia     Drug abuse     GERD (gastroesophageal reflux disease)     History of acute cholangitis     History of cervical fracture     History of gastric ulcer     HTN (hypertension)     PVT (portal vein thrombosis)     Schizophrenia

## 2023-08-08 NOTE — ED ADULT TRIAGE NOTE - CHIEF COMPLAINT QUOTE
Pt arrives to ED sent in by MD Beltre for abnormal labs (K2.7). Pt has presurgical lab work done for upcoming bladder surgery. Denies symptoms at this time.

## 2023-08-08 NOTE — ED STATDOCS - PATIENT PORTAL LINK FT
You can access the FollowMyHealth Patient Portal offered by [facility] by registering at the following website: [email or web address]. By joining [employer]’s FollowMyHealth portal, you will also be able to view your health information using other applications (apps) compatible with our system.

## 2023-08-08 NOTE — ED STATDOCS - CLINICAL SUMMARY MEDICAL DECISION MAKING FREE TEXT BOX
73 y/o M with hypokalemia, will repeat labs and replete if clinically necessary. 73 y/o M with hypokalemia, will repeat labs and replete if clinically necessary.    signed Malika Hooks PA-C   72M sent in by NP from Dr Beltre' office for hypokalamia. K improved from 2.7 to 3.2, pts baseline is 3.1. Will DC, f/u with pts doctors. No significant findings on EKG.. Pt feeling well at DC, agrees with DC and plan of care.

## 2023-08-08 NOTE — H&P PST ADULT - PAIN ASSESSMENT COMPLETED
PLAN:  -fasting labs ordered  -pap smear obtained  -continue healthy lifestyle habits  -f/u annually/as needed
Yes

## 2023-08-08 NOTE — H&P PST ADULT - NSICDXPASTSURGICALHX_GEN_ALL_CORE_FT
PAST SURGICAL HISTORY:  History of biliary stent insertion      PAST SURGICAL HISTORY:  H/O cervical spine surgery     History of biliary stent insertion     S/P ERCP     S/P exploratory laparotomy

## 2023-08-08 NOTE — ED STATDOCS - ATTENDING APP SHARED VISIT CONTRIBUTION OF CARE
I, Lilibeth Campoverde DO,  performed the initial face to face bedside interview with this patient regarding history of present illness, review of symptoms and relevant past medical, social and family history.  I completed an independent physical examination.  I was the initial provider who evaluated this patient.   I personally saw the patient and performed a substantive portion of the visit including all aspects of the medical decision making.  I have signed out the follow up of any pending tests (i.e. labs, radiological studies) to the ACP.  I have communicated the patient’s plan of care and disposition with the ACP.  The history, relevant review of systems, past medical and surgical history, medical decision making, and physical examination was documented by the scribe in my presence and I attest to the accuracy of the documentation.

## 2023-08-08 NOTE — H&P PST ADULT - HISTORY OF PRESENT ILLNESS
73 y/o male with  73 y/o male with CBD stone, S/P ERCP with stent placed in 05/2023, PMH of PV thrombosis, GERD, HTN, schizophrenia. Pt reports occasional nausea, denies abdominal pain. He is here for PST for planned ERCP, stent pull.

## 2023-08-08 NOTE — ED STATDOCS - CARE PROVIDER_API CALL
Bassam Beltre  Gastroenterology  [address] [place] [id number]  Phone: [phone]  Fax: [phone]  Follow Up Time:

## 2023-08-08 NOTE — ED STATDOCS - NSICDXFAMILYHX_GEN_ALL_CORE_FT
FAMILY HISTORY:  Mother  Still living? Unknown  Family history- stomach cancer, Age at diagnosis: Age Unknown    Sibling  Still living? Unknown  Family history of throat cancer, Age at diagnosis: Age Unknown  FH: HTN (hypertension), Age at diagnosis: Age Unknown

## 2023-08-08 NOTE — ED STATDOCS - OBJECTIVE STATEMENT
73 y/o male w/PMHx of alcohol abuse, dementia, drug abuse, GERD, acute cholangitis, gastric ulcer, HTN, PVT, schizophrenia presents to ED sent in by MD Beltre for abnormal lab results. Pt had presurgical lab work done for upcoming bladder surgery, potassium level was 2.7, baseline is 31. No dizziness or lightheadedness, was sent for hypokalemic evaluation. No other complaints at this time. 73 y/o male w/PMHx of alcohol abuse, dementia, drug abuse, GERD, acute cholangitis, gastric ulcer, HTN, PVT, schizophrenia presents to ED sent in by MD Beltre for abnormal lab results. Pt had presurgical lab work done for upcoming bladder surgery, potassium level was 2.7, baseline is 3.1. No dizziness or lightheadedness, was sent for hypokalemic evaluation. No other complaints at this time.

## 2023-08-08 NOTE — ED STATDOCS - NS ED ATTENDING STATEMENT MOD
This was a shared visit with the TURNER. I reviewed and verified the documentation and independently performed the documented:

## 2023-08-09 DIAGNOSIS — Z96.89 PRESENCE OF OTHER SPECIFIED FUNCTIONAL IMPLANTS: ICD-10-CM

## 2023-08-09 PROBLEM — K21.9 GASTRO-ESOPHAGEAL REFLUX DISEASE WITHOUT ESOPHAGITIS: Chronic | Status: ACTIVE | Noted: 2023-08-08

## 2023-08-09 PROBLEM — I10 ESSENTIAL (PRIMARY) HYPERTENSION: Chronic | Status: ACTIVE | Noted: 2023-08-08

## 2023-08-09 PROBLEM — Z87.19 PERSONAL HISTORY OF OTHER DISEASES OF THE DIGESTIVE SYSTEM: Chronic | Status: ACTIVE | Noted: 2023-08-08

## 2023-08-09 PROBLEM — Z87.11 PERSONAL HISTORY OF PEPTIC ULCER DISEASE: Chronic | Status: ACTIVE | Noted: 2023-08-08

## 2023-08-09 PROBLEM — Z87.81 PERSONAL HISTORY OF (HEALED) TRAUMATIC FRACTURE: Chronic | Status: ACTIVE | Noted: 2023-08-08

## 2023-08-09 PROBLEM — I81 PORTAL VEIN THROMBOSIS: Chronic | Status: ACTIVE | Noted: 2023-08-08

## 2023-08-10 ENCOUNTER — APPOINTMENT (OUTPATIENT)
Dept: GASTROENTEROLOGY | Facility: HOSPITAL | Age: 73
End: 2023-08-10
Payer: MEDICARE

## 2023-08-10 ENCOUNTER — TRANSCRIPTION ENCOUNTER (OUTPATIENT)
Age: 73
End: 2023-08-10

## 2023-08-10 ENCOUNTER — OUTPATIENT (OUTPATIENT)
Dept: INPATIENT UNIT | Facility: HOSPITAL | Age: 73
LOS: 1 days | Discharge: HOME | End: 2023-08-10
Payer: MEDICARE

## 2023-08-10 VITALS
SYSTOLIC BLOOD PRESSURE: 109 MMHG | TEMPERATURE: 98 F | OXYGEN SATURATION: 96 % | DIASTOLIC BLOOD PRESSURE: 64 MMHG | RESPIRATION RATE: 16 BRPM | WEIGHT: 123.02 LBS | HEART RATE: 57 BPM | HEIGHT: 73 IN

## 2023-08-10 VITALS
OXYGEN SATURATION: 100 % | DIASTOLIC BLOOD PRESSURE: 90 MMHG | TEMPERATURE: 97 F | HEART RATE: 78 BPM | SYSTOLIC BLOOD PRESSURE: 141 MMHG | RESPIRATION RATE: 14 BRPM

## 2023-08-10 DIAGNOSIS — Z96.89 PRESENCE OF OTHER SPECIFIED FUNCTIONAL IMPLANTS: ICD-10-CM

## 2023-08-10 DIAGNOSIS — Z46.59 ENCOUNTER FOR FITTING AND ADJUSTMENT OF OTHER GASTROINTESTINAL APPLIANCE AND DEVICE: ICD-10-CM

## 2023-08-10 DIAGNOSIS — Z98.890 OTHER SPECIFIED POSTPROCEDURAL STATES: Chronic | ICD-10-CM

## 2023-08-10 DIAGNOSIS — Z79.01 LONG TERM (CURRENT) USE OF ANTICOAGULANTS: ICD-10-CM

## 2023-08-10 DIAGNOSIS — I10 ESSENTIAL (PRIMARY) HYPERTENSION: ICD-10-CM

## 2023-08-10 DIAGNOSIS — F03.90 UNSPECIFIED DEMENTIA, UNSPECIFIED SEVERITY, WITHOUT BEHAVIORAL DISTURBANCE, PSYCHOTIC DISTURBANCE, MOOD DISTURBANCE, AND ANXIETY: ICD-10-CM

## 2023-08-10 DIAGNOSIS — K83.8 OTHER SPECIFIED DISEASES OF BILIARY TRACT: ICD-10-CM

## 2023-08-10 DIAGNOSIS — Z87.891 PERSONAL HISTORY OF NICOTINE DEPENDENCE: ICD-10-CM

## 2023-08-10 DIAGNOSIS — K21.9 GASTRO-ESOPHAGEAL REFLUX DISEASE WITHOUT ESOPHAGITIS: ICD-10-CM

## 2023-08-10 DIAGNOSIS — Z98.1 ARTHRODESIS STATUS: ICD-10-CM

## 2023-08-10 DIAGNOSIS — F20.9 SCHIZOPHRENIA, UNSPECIFIED: ICD-10-CM

## 2023-08-10 LAB
POTASSIUM SERPL-MCNC: 3 MMOL/L — LOW (ref 3.5–5.3)
POTASSIUM SERPL-SCNC: 3 MMOL/L — LOW (ref 3.5–5.3)

## 2023-08-10 PROCEDURE — 43264 ERCP REMOVE DUCT CALCULI: CPT

## 2023-08-10 PROCEDURE — 76000 FLUOROSCOPY <1 HR PHYS/QHP: CPT

## 2023-08-10 PROCEDURE — 36415 COLL VENOUS BLD VENIPUNCTURE: CPT

## 2023-08-10 PROCEDURE — C1889: CPT

## 2023-08-10 PROCEDURE — 43275 ERCP REMOVE FORGN BODY DUCT: CPT

## 2023-08-10 PROCEDURE — C1769: CPT

## 2023-08-10 PROCEDURE — 84132 ASSAY OF SERUM POTASSIUM: CPT

## 2023-08-10 NOTE — ASU DISCHARGE PLAN (ADULT/PEDIATRIC) - NS MD DC FALL RISK RISK
[Patient] : patient For information on Fall & Injury Prevention, visit: [email or web address] OR  [email or web address] OR  https://www.cdc.gov/steadi/patient.html

## 2023-10-11 NOTE — ED PROVIDER NOTE - NSICDXNOPASTSURGICALHX_GEN_ALL_ED
Patient will ambulate 150 feet independently with a rolling walker within 1-2 days for safe community ambulation. Patient will ascend/descend 13 stairs independently with +HR and straight cane within 1-2 days for safe household stair negotiation.
<-- Click to add NO significant Past Surgical History

## 2023-10-25 NOTE — ED ADULT NURSE NOTE - SUICIDE SCREENING QUESTION 1
Patient informed of below.  She verbalized her understanding.   Letter posted to portal and nurse visit scheduled.  Had no further questions or concerns at this time.   No

## 2024-01-22 NOTE — ED BEHAVIORAL HEALTH ASSESSMENT NOTE - NS ED BHA MED ROS MUSCULOSKELETAL
-- DO NOT REPLY / DO NOT REPLY ALL --  -- Message is from [facility] [employer] ([facility]) --    General Patient Message: Secure Chat Follow-up. Patients wife is calling back, was just speaking with nurse Tania and got disconnected. Was hoping to get connected to her again. Please advise      Caller Information         Type Contact Phone/Fax    01/22/2024 12:58 PM CST Phone (Incoming) ANNY LAY \"NERI\" (Emergency Contact) [phone]    01/22/2024 03:26 PM CST Phone (Incoming) ANNY LAY \"NERI\" (Emergency Contact) [phone]          Alternative phone number: No    Can a detailed message be left? Yes    Message Turnaround:                No complaints

## 2024-04-10 NOTE — DISCHARGE NOTE NURSING/CASE MANAGEMENT/SOCIAL WORK - HAVE YOU HAD COVID IN THE LAST 60 DAYS?
No Lot # (Optional): [id number] Performed By: Alexa Harrison, RIZWAN Urine Pregnancy Test Result: negative Expiration Date (Optional): 04/05/2025 Detail Level: None

## 2024-05-16 ENCOUNTER — EMERGENCY (EMERGENCY)
Facility: HOSPITAL | Age: 74
LOS: 0 days | Discharge: HOME | End: 2024-05-17
Attending: EMERGENCY MEDICINE
Payer: MEDICARE

## 2024-05-16 VITALS
SYSTOLIC BLOOD PRESSURE: 128 MMHG | HEART RATE: 89 BPM | RESPIRATION RATE: 18 BRPM | TEMPERATURE: 100 F | DIASTOLIC BLOOD PRESSURE: 68 MMHG | HEIGHT: 61 IN | OXYGEN SATURATION: 95 % | WEIGHT: 132.94 LBS

## 2024-05-16 DIAGNOSIS — R10.33 PERIUMBILICAL PAIN: ICD-10-CM

## 2024-05-16 DIAGNOSIS — E87.6 HYPOKALEMIA: ICD-10-CM

## 2024-05-16 DIAGNOSIS — Z98.890 OTHER SPECIFIED POSTPROCEDURAL STATES: Chronic | ICD-10-CM

## 2024-05-16 DIAGNOSIS — K83.8 OTHER SPECIFIED DISEASES OF BILIARY TRACT: ICD-10-CM

## 2024-05-16 DIAGNOSIS — K57.92 DIVERTICULITIS OF INTESTINE, PART UNSPECIFIED, WITHOUT PERFORATION OR ABSCESS WITHOUT BLEEDING: ICD-10-CM

## 2024-05-16 DIAGNOSIS — R50.9 FEVER, UNSPECIFIED: ICD-10-CM

## 2024-05-16 DIAGNOSIS — Z87.11 PERSONAL HISTORY OF PEPTIC ULCER DISEASE: ICD-10-CM

## 2024-05-16 DIAGNOSIS — Z87.19 PERSONAL HISTORY OF OTHER DISEASES OF THE DIGESTIVE SYSTEM: ICD-10-CM

## 2024-05-16 DIAGNOSIS — I10 ESSENTIAL (PRIMARY) HYPERTENSION: ICD-10-CM

## 2024-05-16 PROCEDURE — 85025 COMPLETE CBC W/AUTO DIFF WBC: CPT

## 2024-05-16 PROCEDURE — 87040 BLOOD CULTURE FOR BACTERIA: CPT

## 2024-05-16 PROCEDURE — 87186 SC STD MICRODIL/AGAR DIL: CPT

## 2024-05-16 PROCEDURE — 87150 DNA/RNA AMPLIFIED PROBE: CPT

## 2024-05-16 PROCEDURE — 36415 COLL VENOUS BLD VENIPUNCTURE: CPT

## 2024-05-16 PROCEDURE — 93005 ELECTROCARDIOGRAM TRACING: CPT

## 2024-05-16 PROCEDURE — 74177 CT ABD & PELVIS W/CONTRAST: CPT | Mod: MC

## 2024-05-16 PROCEDURE — 85730 THROMBOPLASTIN TIME PARTIAL: CPT

## 2024-05-16 PROCEDURE — 96374 THER/PROPH/DIAG INJ IV PUSH: CPT | Mod: XU

## 2024-05-16 PROCEDURE — 83605 ASSAY OF LACTIC ACID: CPT

## 2024-05-16 PROCEDURE — 71045 X-RAY EXAM CHEST 1 VIEW: CPT

## 2024-05-16 PROCEDURE — 99285 EMERGENCY DEPT VISIT HI MDM: CPT | Mod: 25

## 2024-05-16 PROCEDURE — 99285 EMERGENCY DEPT VISIT HI MDM: CPT

## 2024-05-16 PROCEDURE — 80053 COMPREHEN METABOLIC PANEL: CPT

## 2024-05-16 PROCEDURE — 99053 MED SERV 10PM-8AM 24 HR FAC: CPT

## 2024-05-16 PROCEDURE — 87077 CULTURE AEROBIC IDENTIFY: CPT

## 2024-05-16 PROCEDURE — 85610 PROTHROMBIN TIME: CPT

## 2024-05-16 PROCEDURE — 96375 TX/PRO/DX INJ NEW DRUG ADDON: CPT

## 2024-05-16 NOTE — ED PROVIDER NOTE - CLINICAL SUMMARY MEDICAL DECISION MAKING FREE TEXT BOX
72 y/o male with PMHx of HTN, gastritis, gastric ulcer presents to the ED c/o periumbilical abdominal pain associated with fevers, chills that started this morning. Pt states that abdominal pain improved with pepto bismol. Pt unsure if he still has his appendix, Pt denies recent travel.  Will get septic workup to include CBC, lactate, blood cultures, administer IV fluids, and obtain CTAP with IV contrast.

## 2024-05-16 NOTE — ED PROVIDER NOTE - NSFOLLOWUPINSTRUCTIONS_ED_ALL_ED_FT
Diverticulitis  Diverticulitis       La diverticulitis es la infección o inflamación de pequeñas bolsas (divertículos) en el colon que se jaclyn por elsa afección llamada diverticulosis. Los divertículos pueden atrapar las heces (materia fecal) y las bacterias, lo cual causa infección e inflamación.    La diverticulitis puede causar dolor intenso de estómago y diarrea. Puede causar daños en los tejidos del colon, lo cual provoca sangrado u obstrucción. Los divertículos también pueden explotar (ruptura) y causar que las heces infectadas ingresen a otras áreas del abdomen.    ¿Cuáles son las causas?  Esta afección es causada por las heces que quedan atrapadas en los divertículos, lo cual permite que las bacterias crezcan en ellos. Blackwell causa inflamación e infección.    ¿Qué incrementa el riesgo?  Es más probable que tenga esta afección si tiene diverticulosis. El riesgo aumenta si usted:    Tiene sobrepeso u obesidad.  No realiza suficiente actividad física.  Minal alcohol.  Consume productos con tabaco.  Lleva elsa dieta con gran cantidad de aria andre, eileen carne de ethel, cerdo o smiley.  Lleva elsa dieta que no incluye la suficiente cantidad de fibra. Los alimentos ricos en fibra incluyen frutas, verduras, frijoles, vickie secos y cereales integrales.  Tiene más de 40 años.    ¿Cuáles son los signos o síntomas?  Los síntomas de esta afección pueden incluir los siguientes:    Dolor y sensibilidad en el abdomen. El dolor en general se siente del lado ken del abdomen, pedro puede sentirse en otras zonas.  Fiebre y escalofríos.  Náuseas.  Vómitos.  Calambres.  Meteorismo.  Cambios en los hábitos intestinales.  Melo en las heces.    ¿Cómo se diagnostica?  Esta afección se diagnostica en función de lo siguiente:    Caitie antecedentes médicos.  Un examen físico.  Pruebas para descartar otros motivos que causen la afección. Estas pruebas pueden incluir lo siguiente:    Análisis de melo.  Análisis de orina.  Exploración por tomografía computarizada (TC) del abdomen.    ¿Cómo se trata?  La mayoría de los casos de esta afección son leves y pueden tratarse en el hogar. El tratamiento puede incluir:    Elvin analgésicos de venta shan.  Seguir elsa dieta líquida absoluta.  Elvin antibióticos por vía oral.  Hacer reposo.    Puede que los casos más graves deban tratarse en el hospital. El tratamiento puede incluir:    No comer ni beber nada.  Elvin analgésicos recetados.  Recibir antibióticos a través de elsa vía intravenosa (IV).  Recibir líquidos y alimentos a través de elsa vía IV.  Cirugía.    Cuando la afección esté controlada, el médico puede recomendarle que se angel elsa colonoscopia. Se trata de un examen que se realiza para examinar todo el intestino grueso. Shashi el examen, se introduce un tubo lubricado y flexible en el ano que luego se lleva hasta el recto, el colon y otras partes del intestino grueso. Mediante elsa colonoscopia, se puede determinar la gravedad de los divertículos y si hay algo más que pueda estar causando los síntomas.    Siga estas instrucciones en moreno casa:      Medicamentos    Lydia los medicamentos de venta shan y los recetados solamente eileen se lo haya indicado el médico. Estos incluyen suplementos de fibra, probióticos y laxantes.  Si le recetaron un antibiótico, tómelo eileen se lo haya indicado el médico. No deje de elvin el antibiótico aunque comience a sentirse mejor.  Pregúntele al médico si el medicamento recetado le impide conducir o usar maquinaria.        Comida y bebida     Siga las indicaciones del médico acerca de si debe seguir elsa dieta líquida o de otro tipo.  Cuando los síntomas mejoren, el médico puede indicarle que modifique la dieta. Puede recomendarle que consuma elsa dieta con al menos 25 gramos (25 g) de fibra a diario. La fibra facilita la eliminación de heces. Las ro saludables de fibra incluyen:    Bayas. Elsa taza contiene de 4 a 8 gramos de fibra.  Frijoles y lentejas. Media taza contiene de 5 a 8 gramos de fibra.  Verduras de hoja nevaeh. Elsa taza contiene 4 gramos de fibra.  Evite comer carne gisela.        Instrucciones generales    No consuma ningún producto que contenga nicotina o tabaco, eileen cigarrillos, cigarrillos electrónicos y tabaco de mascar. Si necesita ayuda para dejar de consumir estos productos, consulte al médico.  Realizar al menos 30 minutos de actividad física diaria, 3 veces por semana. El ejercicio debe tener la intensidad suficiente eileen para aumentar la frecuencia cardíaca y provocar sudor.  Concurra a todas las visitas de seguimiento eileen se lo haya indicado el médico. Blackwell es importante. Es posible que necesite someterse a elsa colonoscopía.    Comuníquese con un médico si:  El dolor no mejora.  Las deposiciones no se normalizan.    Solicite ayuda de inmediato si:  El dolor empeora.  Los síntomas no mejoran con el tratamiento.  Los síntomas empeoran repentinamente.  Tiene fiebre.  Vomita más de elsa vez.  Las heces son sanguinolentas, negras o alquitranadas.    Resumen  La diverticulitis es la infección o inflamación de pequeñas bolsas (divertículos) en el colon que se jaclyn por elsa afección llamada diverticulosis. Los divertículos pueden atrapar las heces (materia fecal) y las bacterias, lo cual causa infección e inflamación.  Usted tiene un riesgo mayor de tener esta afección si sufre de diverticulosis y sigue elsa dieta que no incluye la suficiente cantidad de fibra.  La mayoría de los casos de esta afección son leves y pueden tratarse en el hogar. Puede que los casos más graves deban tratarse en el hospital.  Cuando la afección esté controlada, el médico puede recomendarle que se angel un examen llamado colonoscopía. Erika examen puede mostrar la gravedad de los divertículos y si hay algo más que pueda estar causando los síntomas.  Concurra a todas las visitas de seguimiento eileen se lo haya indicado el médico. Blackwell es importante.    NOTAS ADICIONALES E INSTRUCCIONES    Please follow up with your Primary MD in 24-48 hr.  Seek immediate medical care for any new/worsening signs or symptoms.

## 2024-05-16 NOTE — ED PROVIDER NOTE - OBJECTIVE STATEMENT
74 y/o male with PMHx of HTN, gastritis, gastric ulcer presents to the ED c/o periumbilical abdominal pain associated with fevers, chills that started this morning. Pt states that abdominal pain improved with pepto bismol. Pt unsure if he still has his appendix, Pt denies recent travel.

## 2024-05-16 NOTE — ED ADULT TRIAGE NOTE - CHIEF COMPLAINT QUOTE
Pt BIBEMS from home c/o abdominal pain starting at 3PM today. Pt reports fever/chills. Pt denies N/V/D. No meds taken PTA. PMHx HTN, HLD.

## 2024-05-16 NOTE — ED PROVIDER NOTE - PROGRESS NOTE DETAILS
Patient feeling better.  Hypokalemia repleted in the emergency department.  The CT finding of common bile duct dilatation that was seen on prior CT is noted but the patient has normal bilirubin and does not have any right upper quadrant abdominal pain or tenderness to palpation.  Patient will be advised to follow-up with his gastroenterologist.  Patient advised that he has likely early onset of diverticulitis so we will treat with Augmentin 875 mg twice daily for 10 days.  Janusz Mann, DO

## 2024-05-16 NOTE — ED PROVIDER NOTE - PATIENT PORTAL LINK FT
You can access the FollowMyHealth Patient Portal offered by [facility] by registering at the following website: [email or web address]. By joining [facility]’s FollowMyHealth portal, you will also be able to view your health information using other applications (apps) compatible with our system.

## 2024-05-17 VITALS
TEMPERATURE: 98 F | OXYGEN SATURATION: 97 % | HEART RATE: 60 BPM | RESPIRATION RATE: 18 BRPM | DIASTOLIC BLOOD PRESSURE: 61 MMHG | SYSTOLIC BLOOD PRESSURE: 115 MMHG

## 2024-05-17 LAB
ALBUMIN SERPL ELPH-MCNC: 3.8 G/DL — SIGNIFICANT CHANGE UP (ref 3.3–5)
ALP SERPL-CCNC: 93 U/L — SIGNIFICANT CHANGE UP (ref 40–120)
ALT FLD-CCNC: 18 U/L — SIGNIFICANT CHANGE UP (ref 12–78)
ANION GAP SERPL CALC-SCNC: 4 MMOL/L — LOW (ref 5–17)
APTT BLD: 29.2 SEC — SIGNIFICANT CHANGE UP (ref 24.5–35.6)
AST SERPL-CCNC: 23 U/L — SIGNIFICANT CHANGE UP (ref 15–37)
BASOPHILS # BLD AUTO: 0.06 K/UL — SIGNIFICANT CHANGE UP (ref 0–0.2)
BASOPHILS NFR BLD AUTO: 0.6 % — SIGNIFICANT CHANGE UP (ref 0–2)
BILIRUB SERPL-MCNC: 0.5 MG/DL — SIGNIFICANT CHANGE UP (ref 0.2–1.2)
BUN SERPL-MCNC: 16 MG/DL — SIGNIFICANT CHANGE UP (ref 7–23)
CALCIUM SERPL-MCNC: 9.1 MG/DL — SIGNIFICANT CHANGE UP (ref 8.5–10.1)
CHLORIDE SERPL-SCNC: 108 MMOL/L — SIGNIFICANT CHANGE UP (ref 96–108)
CO2 SERPL-SCNC: 29 MMOL/L — SIGNIFICANT CHANGE UP (ref 22–31)
CREAT SERPL-MCNC: 1.23 MG/DL — SIGNIFICANT CHANGE UP (ref 0.5–1.3)
E COLI DNA BLD POS QL NAA+NON-PROBE: SIGNIFICANT CHANGE UP
EGFR: 62 ML/MIN/1.73M2 — SIGNIFICANT CHANGE UP
EOSINOPHIL # BLD AUTO: 0.23 K/UL — SIGNIFICANT CHANGE UP (ref 0–0.5)
EOSINOPHIL NFR BLD AUTO: 2.1 % — SIGNIFICANT CHANGE UP (ref 0–6)
GLUCOSE SERPL-MCNC: 105 MG/DL — HIGH (ref 70–99)
GRAM STN FLD: ABNORMAL
HCT VFR BLD CALC: 34.1 % — LOW (ref 39–50)
HGB BLD-MCNC: 11.1 G/DL — LOW (ref 13–17)
IMM GRANULOCYTES NFR BLD AUTO: 0.4 % — SIGNIFICANT CHANGE UP (ref 0–0.9)
INR BLD: 1.05 RATIO — SIGNIFICANT CHANGE UP (ref 0.85–1.18)
LACTATE SERPL-SCNC: 1.1 MMOL/L — SIGNIFICANT CHANGE UP (ref 0.7–2)
LYMPHOCYTES # BLD AUTO: 0.71 K/UL — LOW (ref 1–3.3)
LYMPHOCYTES # BLD AUTO: 6.6 % — LOW (ref 13–44)
MCHC RBC-ENTMCNC: 26.7 PG — LOW (ref 27–34)
MCHC RBC-ENTMCNC: 32.6 GM/DL — SIGNIFICANT CHANGE UP (ref 32–36)
MCV RBC AUTO: 82 FL — SIGNIFICANT CHANGE UP (ref 80–100)
METHOD TYPE: SIGNIFICANT CHANGE UP
MONOCYTES # BLD AUTO: 1.26 K/UL — HIGH (ref 0–0.9)
MONOCYTES NFR BLD AUTO: 11.6 % — SIGNIFICANT CHANGE UP (ref 2–14)
NEUTROPHILS # BLD AUTO: 8.53 K/UL — HIGH (ref 1.8–7.4)
NEUTROPHILS NFR BLD AUTO: 78.7 % — HIGH (ref 43–77)
PLATELET # BLD AUTO: 244 K/UL — SIGNIFICANT CHANGE UP (ref 150–400)
POTASSIUM SERPL-MCNC: 2.9 MMOL/L — CRITICAL LOW (ref 3.5–5.3)
POTASSIUM SERPL-SCNC: 2.9 MMOL/L — CRITICAL LOW (ref 3.5–5.3)
PROT SERPL-MCNC: 7.4 GM/DL — SIGNIFICANT CHANGE UP (ref 6–8.3)
PROTHROM AB SERPL-ACNC: 11.9 SEC — SIGNIFICANT CHANGE UP (ref 9.5–13)
RBC # BLD: 4.16 M/UL — LOW (ref 4.2–5.8)
RBC # FLD: 16.3 % — HIGH (ref 10.3–14.5)
SODIUM SERPL-SCNC: 141 MMOL/L — SIGNIFICANT CHANGE UP (ref 135–145)
SPECIMEN SOURCE: SIGNIFICANT CHANGE UP
WBC # BLD: 10.83 K/UL — HIGH (ref 3.8–10.5)
WBC # FLD AUTO: 10.83 K/UL — HIGH (ref 3.8–10.5)

## 2024-05-17 PROCEDURE — 93010 ELECTROCARDIOGRAM REPORT: CPT

## 2024-05-17 PROCEDURE — 71045 X-RAY EXAM CHEST 1 VIEW: CPT | Mod: 26

## 2024-05-17 PROCEDURE — 74177 CT ABD & PELVIS W/CONTRAST: CPT | Mod: 26,MC

## 2024-05-17 RX ORDER — POTASSIUM CHLORIDE 20 MEQ
10 PACKET (EA) ORAL ONCE
Refills: 0 | Status: COMPLETED | OUTPATIENT
Start: 2024-05-17 | End: 2024-05-17

## 2024-05-17 RX ORDER — ACETAMINOPHEN 500 MG
1000 TABLET ORAL ONCE
Refills: 0 | Status: COMPLETED | OUTPATIENT
Start: 2024-05-17 | End: 2024-05-17

## 2024-05-17 RX ORDER — POTASSIUM CHLORIDE 20 MEQ
40 PACKET (EA) ORAL ONCE
Refills: 0 | Status: COMPLETED | OUTPATIENT
Start: 2024-05-17 | End: 2024-05-17

## 2024-05-17 RX ORDER — SODIUM CHLORIDE 9 MG/ML
1000 INJECTION INTRAMUSCULAR; INTRAVENOUS; SUBCUTANEOUS ONCE
Refills: 0 | Status: COMPLETED | OUTPATIENT
Start: 2024-05-17 | End: 2024-05-17

## 2024-05-17 RX ADMIN — SODIUM CHLORIDE 1000 MILLILITER(S): 9 INJECTION INTRAMUSCULAR; INTRAVENOUS; SUBCUTANEOUS at 01:04

## 2024-05-17 RX ADMIN — Medication 20 MILLIEQUIVALENT(S): at 01:39

## 2024-05-17 RX ADMIN — Medication 100 MILLIEQUIVALENT(S): at 01:40

## 2024-05-17 RX ADMIN — Medication 400 MILLIGRAM(S): at 01:17

## 2024-05-17 RX ADMIN — Medication 1 TABLET(S): at 02:46

## 2024-05-17 NOTE — ED ADULT NURSE NOTE - OBJECTIVE STATEMENT
pt. came in from home with c/o abdominal pain started today, and he feels like an acid, known with acid reflux, on omeprazole, pt. came in from home with c/o abdominal pain started today, and he feels like an acid, known with acid reflux, on omeprazole, alert and oriented, safety and maintained applied.

## 2024-05-19 LAB
-  AMPICILLIN/SULBACTAM: SIGNIFICANT CHANGE UP
-  AMPICILLIN: SIGNIFICANT CHANGE UP
-  AZTREONAM: SIGNIFICANT CHANGE UP
-  CEFAZOLIN: SIGNIFICANT CHANGE UP
-  CEFEPIME: SIGNIFICANT CHANGE UP
-  CEFOXITIN: SIGNIFICANT CHANGE UP
-  CEFTRIAXONE: SIGNIFICANT CHANGE UP
-  CIPROFLOXACIN: SIGNIFICANT CHANGE UP
-  ERTAPENEM: SIGNIFICANT CHANGE UP
-  GENTAMICIN: SIGNIFICANT CHANGE UP
-  IMIPENEM: SIGNIFICANT CHANGE UP
-  LEVOFLOXACIN: SIGNIFICANT CHANGE UP
-  MEROPENEM: SIGNIFICANT CHANGE UP
-  PIPERACILLIN/TAZOBACTAM: SIGNIFICANT CHANGE UP
-  TOBRAMYCIN: SIGNIFICANT CHANGE UP
-  TRIMETHOPRIM/SULFAMETHOXAZOLE: SIGNIFICANT CHANGE UP
CULTURE RESULTS: ABNORMAL
METHOD TYPE: SIGNIFICANT CHANGE UP
ORGANISM # SPEC MICROSCOPIC CNT: ABNORMAL
ORGANISM # SPEC MICROSCOPIC CNT: ABNORMAL
ORGANISM # SPEC MICROSCOPIC CNT: SIGNIFICANT CHANGE UP
SPECIMEN SOURCE: SIGNIFICANT CHANGE UP

## 2024-05-20 ENCOUNTER — INPATIENT (INPATIENT)
Facility: HOSPITAL | Age: 74
LOS: 1 days | Discharge: HOME | DRG: 872 | End: 2024-05-22
Attending: HOSPITALIST | Admitting: INTERNAL MEDICINE
Payer: MEDICARE

## 2024-05-20 VITALS — HEIGHT: 61 IN

## 2024-05-20 DIAGNOSIS — Z98.890 OTHER SPECIFIED POSTPROCEDURAL STATES: Chronic | ICD-10-CM

## 2024-05-20 DIAGNOSIS — R78.81 BACTEREMIA: ICD-10-CM

## 2024-05-20 LAB
ALBUMIN SERPL ELPH-MCNC: 4.3 G/DL — SIGNIFICANT CHANGE UP (ref 3.3–5)
ALP SERPL-CCNC: 123 U/L — HIGH (ref 40–120)
ALT FLD-CCNC: 29 U/L — SIGNIFICANT CHANGE UP (ref 12–78)
ANION GAP SERPL CALC-SCNC: 4 MMOL/L — LOW (ref 5–17)
APPEARANCE UR: CLEAR — SIGNIFICANT CHANGE UP
APTT BLD: 31 SEC — SIGNIFICANT CHANGE UP (ref 24.5–35.6)
AST SERPL-CCNC: 27 U/L — SIGNIFICANT CHANGE UP (ref 15–37)
BASOPHILS # BLD AUTO: 0.06 K/UL — SIGNIFICANT CHANGE UP (ref 0–0.2)
BASOPHILS NFR BLD AUTO: 0.9 % — SIGNIFICANT CHANGE UP (ref 0–2)
BILIRUB SERPL-MCNC: 0.3 MG/DL — SIGNIFICANT CHANGE UP (ref 0.2–1.2)
BILIRUB UR-MCNC: NEGATIVE — SIGNIFICANT CHANGE UP
BUN SERPL-MCNC: 13 MG/DL — SIGNIFICANT CHANGE UP (ref 7–23)
CALCIUM SERPL-MCNC: 9.4 MG/DL — SIGNIFICANT CHANGE UP (ref 8.5–10.1)
CHLORIDE SERPL-SCNC: 110 MMOL/L — HIGH (ref 96–108)
CO2 SERPL-SCNC: 28 MMOL/L — SIGNIFICANT CHANGE UP (ref 22–31)
COLOR SPEC: YELLOW — SIGNIFICANT CHANGE UP
CREAT SERPL-MCNC: 1.22 MG/DL — SIGNIFICANT CHANGE UP (ref 0.5–1.3)
DIFF PNL FLD: NEGATIVE — SIGNIFICANT CHANGE UP
EGFR: 62 ML/MIN/1.73M2 — SIGNIFICANT CHANGE UP
EOSINOPHIL # BLD AUTO: 0.37 K/UL — SIGNIFICANT CHANGE UP (ref 0–0.5)
EOSINOPHIL NFR BLD AUTO: 5.6 % — SIGNIFICANT CHANGE UP (ref 0–6)
GLUCOSE SERPL-MCNC: 100 MG/DL — HIGH (ref 70–99)
GLUCOSE UR QL: NEGATIVE MG/DL — SIGNIFICANT CHANGE UP
HCT VFR BLD CALC: 38.3 % — LOW (ref 39–50)
HCV AB S/CO SERPL IA: 0.22 S/CO — SIGNIFICANT CHANGE UP (ref 0–0.99)
HCV AB SERPL-IMP: SIGNIFICANT CHANGE UP
HGB BLD-MCNC: 12.1 G/DL — LOW (ref 13–17)
HIV 1 & 2 AB SERPL IA.RAPID: SIGNIFICANT CHANGE UP
IMM GRANULOCYTES NFR BLD AUTO: 0.5 % — SIGNIFICANT CHANGE UP (ref 0–0.9)
INR BLD: 0.97 RATIO — SIGNIFICANT CHANGE UP (ref 0.85–1.18)
KETONES UR-MCNC: NEGATIVE MG/DL — SIGNIFICANT CHANGE UP
LACTATE SERPL-SCNC: 1 MMOL/L — SIGNIFICANT CHANGE UP (ref 0.7–2)
LEUKOCYTE ESTERASE UR-ACNC: NEGATIVE — SIGNIFICANT CHANGE UP
LYMPHOCYTES # BLD AUTO: 1.09 K/UL — SIGNIFICANT CHANGE UP (ref 1–3.3)
LYMPHOCYTES # BLD AUTO: 16.5 % — SIGNIFICANT CHANGE UP (ref 13–44)
MCHC RBC-ENTMCNC: 26.4 PG — LOW (ref 27–34)
MCHC RBC-ENTMCNC: 31.6 GM/DL — LOW (ref 32–36)
MCV RBC AUTO: 83.4 FL — SIGNIFICANT CHANGE UP (ref 80–100)
MONOCYTES # BLD AUTO: 0.48 K/UL — SIGNIFICANT CHANGE UP (ref 0–0.9)
MONOCYTES NFR BLD AUTO: 7.3 % — SIGNIFICANT CHANGE UP (ref 2–14)
NEUTROPHILS # BLD AUTO: 4.57 K/UL — SIGNIFICANT CHANGE UP (ref 1.8–7.4)
NEUTROPHILS NFR BLD AUTO: 69.2 % — SIGNIFICANT CHANGE UP (ref 43–77)
NITRITE UR-MCNC: NEGATIVE — SIGNIFICANT CHANGE UP
PH UR: 8 — SIGNIFICANT CHANGE UP (ref 5–8)
PLATELET # BLD AUTO: 251 K/UL — SIGNIFICANT CHANGE UP (ref 150–400)
POTASSIUM SERPL-MCNC: 3.5 MMOL/L — SIGNIFICANT CHANGE UP (ref 3.5–5.3)
POTASSIUM SERPL-SCNC: 3.5 MMOL/L — SIGNIFICANT CHANGE UP (ref 3.5–5.3)
PROT SERPL-MCNC: 8.6 GM/DL — HIGH (ref 6–8.3)
PROT UR-MCNC: NEGATIVE MG/DL — SIGNIFICANT CHANGE UP
PROTHROM AB SERPL-ACNC: 11 SEC — SIGNIFICANT CHANGE UP (ref 9.5–13)
RBC # BLD: 4.59 M/UL — SIGNIFICANT CHANGE UP (ref 4.2–5.8)
RBC # FLD: 16.9 % — HIGH (ref 10.3–14.5)
SODIUM SERPL-SCNC: 142 MMOL/L — SIGNIFICANT CHANGE UP (ref 135–145)
SP GR SPEC: 1 — SIGNIFICANT CHANGE UP (ref 1–1.03)
UROBILINOGEN FLD QL: 0.2 MG/DL — SIGNIFICANT CHANGE UP (ref 0.2–1)
WBC # BLD: 6.6 K/UL — SIGNIFICANT CHANGE UP (ref 3.8–10.5)
WBC # FLD AUTO: 6.6 K/UL — SIGNIFICANT CHANGE UP (ref 3.8–10.5)

## 2024-05-20 PROCEDURE — 85730 THROMBOPLASTIN TIME PARTIAL: CPT

## 2024-05-20 PROCEDURE — 74183 MRI ABD W/O CNTR FLWD CNTR: CPT | Mod: MC

## 2024-05-20 PROCEDURE — 99285 EMERGENCY DEPT VISIT HI MDM: CPT

## 2024-05-20 PROCEDURE — 36415 COLL VENOUS BLD VENIPUNCTURE: CPT

## 2024-05-20 PROCEDURE — 85027 COMPLETE CBC AUTOMATED: CPT

## 2024-05-20 PROCEDURE — A9579: CPT

## 2024-05-20 PROCEDURE — 85610 PROTHROMBIN TIME: CPT

## 2024-05-20 PROCEDURE — 76705 ECHO EXAM OF ABDOMEN: CPT | Mod: 26

## 2024-05-20 PROCEDURE — 99223 1ST HOSP IP/OBS HIGH 75: CPT

## 2024-05-20 PROCEDURE — 80048 BASIC METABOLIC PNL TOTAL CA: CPT

## 2024-05-20 PROCEDURE — 93010 ELECTROCARDIOGRAM REPORT: CPT

## 2024-05-20 PROCEDURE — 71045 X-RAY EXAM CHEST 1 VIEW: CPT | Mod: 26

## 2024-05-20 RX ORDER — OLANZAPINE 15 MG/1
1 TABLET, FILM COATED ORAL
Qty: 0 | Refills: 0 | DISCHARGE

## 2024-05-20 RX ORDER — ATORVASTATIN CALCIUM 80 MG/1
1 TABLET, FILM COATED ORAL
Refills: 0 | DISCHARGE

## 2024-05-20 RX ORDER — ONDANSETRON 8 MG/1
4 TABLET, FILM COATED ORAL EVERY 8 HOURS
Refills: 0 | Status: DISCONTINUED | OUTPATIENT
Start: 2024-05-20 | End: 2024-05-22

## 2024-05-20 RX ORDER — PANTOPRAZOLE SODIUM 20 MG/1
40 TABLET, DELAYED RELEASE ORAL
Refills: 0 | Status: DISCONTINUED | OUTPATIENT
Start: 2024-05-20 | End: 2024-05-22

## 2024-05-20 RX ORDER — OMEPRAZOLE 10 MG/1
1 CAPSULE, DELAYED RELEASE ORAL
Qty: 0 | Refills: 0 | DISCHARGE

## 2024-05-20 RX ORDER — AMLODIPINE BESYLATE 2.5 MG/1
2.5 TABLET ORAL DAILY
Refills: 0 | Status: DISCONTINUED | OUTPATIENT
Start: 2024-05-20 | End: 2024-05-22

## 2024-05-20 RX ORDER — AMLODIPINE BESYLATE 2.5 MG/1
1 TABLET ORAL
Qty: 0 | Refills: 0 | DISCHARGE

## 2024-05-20 RX ORDER — SODIUM CHLORIDE 9 MG/ML
1000 INJECTION INTRAMUSCULAR; INTRAVENOUS; SUBCUTANEOUS
Refills: 0 | Status: DISCONTINUED | OUTPATIENT
Start: 2024-05-20 | End: 2024-05-20

## 2024-05-20 RX ORDER — ACETAMINOPHEN 500 MG
650 TABLET ORAL EVERY 6 HOURS
Refills: 0 | Status: DISCONTINUED | OUTPATIENT
Start: 2024-05-20 | End: 2024-05-22

## 2024-05-20 RX ORDER — ATORVASTATIN CALCIUM 80 MG/1
10 TABLET, FILM COATED ORAL AT BEDTIME
Refills: 0 | Status: DISCONTINUED | OUTPATIENT
Start: 2024-05-20 | End: 2024-05-22

## 2024-05-20 RX ORDER — AMLODIPINE BESYLATE 2.5 MG/1
1 TABLET ORAL
Refills: 0 | DISCHARGE

## 2024-05-20 RX ORDER — CEFTRIAXONE 500 MG/1
2000 INJECTION, POWDER, FOR SOLUTION INTRAMUSCULAR; INTRAVENOUS EVERY 24 HOURS
Refills: 0 | Status: DISCONTINUED | OUTPATIENT
Start: 2024-05-20 | End: 2024-05-22

## 2024-05-20 RX ORDER — OLANZAPINE 15 MG/1
5 TABLET, FILM COATED ORAL AT BEDTIME
Refills: 0 | Status: DISCONTINUED | OUTPATIENT
Start: 2024-05-20 | End: 2024-05-22

## 2024-05-20 RX ORDER — FLUTICASONE PROPIONATE 50 MCG
1 SPRAY, SUSPENSION NASAL
Qty: 0 | Refills: 0 | DISCHARGE

## 2024-05-20 RX ORDER — LANOLIN ALCOHOL/MO/W.PET/CERES
3 CREAM (GRAM) TOPICAL AT BEDTIME
Refills: 0 | Status: DISCONTINUED | OUTPATIENT
Start: 2024-05-20 | End: 2024-05-22

## 2024-05-20 RX ORDER — PIPERACILLIN AND TAZOBACTAM 4; .5 G/20ML; G/20ML
3.38 INJECTION, POWDER, LYOPHILIZED, FOR SOLUTION INTRAVENOUS ONCE
Refills: 0 | Status: COMPLETED | OUTPATIENT
Start: 2024-05-20 | End: 2024-05-20

## 2024-05-20 RX ORDER — METOPROLOL TARTRATE 50 MG
0.5 TABLET ORAL
Qty: 30 | Refills: 0 | DISCHARGE

## 2024-05-20 RX ORDER — METOPROLOL TARTRATE 50 MG
12.5 TABLET ORAL
Refills: 0 | Status: DISCONTINUED | OUTPATIENT
Start: 2024-05-20 | End: 2024-05-22

## 2024-05-20 RX ADMIN — SODIUM CHLORIDE 100 MILLILITER(S): 9 INJECTION INTRAMUSCULAR; INTRAVENOUS; SUBCUTANEOUS at 11:39

## 2024-05-20 RX ADMIN — PIPERACILLIN AND TAZOBACTAM 200 GRAM(S): 4; .5 INJECTION, POWDER, LYOPHILIZED, FOR SOLUTION INTRAVENOUS at 11:40

## 2024-05-20 RX ADMIN — OLANZAPINE 5 MILLIGRAM(S): 15 TABLET, FILM COATED ORAL at 22:45

## 2024-05-20 RX ADMIN — Medication 650 MILLIGRAM(S): at 22:02

## 2024-05-20 RX ADMIN — ATORVASTATIN CALCIUM 10 MILLIGRAM(S): 80 TABLET, FILM COATED ORAL at 21:31

## 2024-05-20 RX ADMIN — CEFTRIAXONE 2000 MILLIGRAM(S): 500 INJECTION, POWDER, FOR SOLUTION INTRAMUSCULAR; INTRAVENOUS at 13:44

## 2024-05-20 RX ADMIN — Medication 650 MILLIGRAM(S): at 21:32

## 2024-05-20 RX ADMIN — Medication 12.5 MILLIGRAM(S): at 18:16

## 2024-05-20 NOTE — PATIENT PROFILE ADULT - FALL HARM RISK - FALLEN IN PAST
[Normal Oropharynx] : the oropharynx was normal No [Supple] : supple [No Respiratory Distress] : no respiratory distress  [Clear to Auscultation] : lungs were clear to auscultation bilaterally [No Accessory Muscle Use] : no accessory muscle use [Normal Rate] : normal rate  [Regular Rhythm] : with a regular rhythm [Normal S1, S2] : normal S1 and S2 [No Edema] : there was no peripheral edema [Soft] : abdomen soft [Normal Bowel Sounds] : normal bowel sounds [de-identified] : very emotional/crying [de-identified] : LLQ pain > RLQ pain with palpation, no rebound

## 2024-05-20 NOTE — ED ADULT TRIAGE NOTE - CHIEF COMPLAINT QUOTE
Patient presents to the ER after getting a call to come back for positive blood cultures. Patient denies any complaints at this time.

## 2024-05-20 NOTE — ED ADULT NURSE NOTE - OBJECTIVE STATEMENT
Patient is a 73y old male, alert and oriented X3, presenting to the ER with complaints of sent by MD. Patient reports "I was seen here on Thursday and told I had an infection in my stomach. I am taking antibiotics at home. I was called to come back to the hospital because of positive blood cultures."  Patient denies CP, SOB, fevers, nausea, vomiting, abdominal pain.   EKG completed and cardiac monitor in place.   Patient ambulatory without difficulty. Placed in RW7.

## 2024-05-20 NOTE — ED STATDOCS - PROGRESS NOTE DETAILS
73-year-old male with a past medical history of high blood pressure, high cholesterol, GERD presents for  positive blood cultures.  Patient was called back from his prior visit on May 17 where he was diagnosed with possible early diverticulitis and was discharged home on antibiotics.  Patient states he still has some mild burning sensation to the left lower abdomen and mild diarrhea.  Denies fevers, vomiting, dysuria, hematuria, frequency of urination.  No tenderness to palpation to the abdomen.  Will check labs, UA, chest x-ray, ultrasound of the right upper quadrant (due to previous CT showing CBD dilation), and started antibiotics for bacteremia. -Zeke Collier PA-C Labs without leukocytosis and normal lactate. UA unremarkable. Will admit for IV abx given e coli bacteremia. -Zeke Collier PA-C

## 2024-05-20 NOTE — H&P ADULT - HISTORY OF PRESENT ILLNESS
Statement: 73-year-old male with history of hypertension high cholesterol presents the emergency department for positive blood cultures.  Patient states he was recently seen in the emergency department and diagnosed with diverticulitis.  He was given oral antibiotics however he was called back for positive blood cultures with E. coli. Patient denies fevers chills nausea vomiting states he does have slight diarrhea.  No abdominal pain no dysuria.  Bl cx sent from ed

## 2024-05-20 NOTE — H&P ADULT - NSHPPHYSICALEXAM_GEN_ALL_CORE
Vital Signs Last 24 Hrs  T(C): 36.9 (20 May 2024 10:02), Max: 36.9 (20 May 2024 10:02)  T(F): 98.4 (20 May 2024 10:02), Max: 98.4 (20 May 2024 10:02)  HR: 67 (20 May 2024 10:02) (67 - 67)  BP: 137/85 (20 May 2024 10:02) (137/85 - 137/85)  BP(mean): 99 (20 May 2024 10:02) (99 - 99)  RR: 18 (20 May 2024 10:02) (18 - 18)  SpO2: 98% (20 May 2024 10:02) (98% - 98%)    Parameters below as of 20 May 2024 10:02  Patient On (Oxygen Delivery Method): room air    PHYSICAL EXAM:      Constitutional: NAD  Eyes: perrl, no conjunctival changes  ENMT: no exudates, moist oral muc, uvula midline  Neck: no JVD, no LAD  Back: no cva tenderness  Respiratory: CTA, no exp wheezes  Cardiovascular: S1S2 reg, no murmur gallop or rub  Gastrointestinal: abd soft, NT/ND + BS  Genitourinary: voiding  Extremities: FROM, no joint effusions, no edema, no clubbing , no cyanosis  Vascular: pedal pulses + bilateral, warm extremities  Neurological: non focal, mot str 5/5/ all extr  Skin: no rashes  Lymph Nodes: no LAD

## 2024-05-20 NOTE — ED ADULT NURSE NOTE - NSFALLUNIVINTERV_ED_ALL_ED
Bed/Stretcher in lowest position, wheels locked, appropriate side rails in place/Call bell, personal items and telephone in reach/Instruct patient to call for assistance before getting out of bed/chair/stretcher/Non-slip footwear applied when patient is off stretcher/[place] to call system/Physically safe environment - no spills, clutter or unnecessary equipment/Purposeful proactive rounding/Room/bathroom lighting operational, light cord in reach

## 2024-05-20 NOTE — ED STATDOCS - ATTENDING APP SHARED VISIT CONTRIBUTION OF CARE
I, Jose F Crow MD, personally saw the patient with ACP.  I have personally performed a face to face diagnostic evaluation on this patient.  I have reviewed the ACP note and agree with the history, exam, and plan of care, except as noted. I personally made/approved the management plan and take responsibility for the patient management   The initial assessment was performed by myself and then the patient was handed off to the ACP. The patient was followed and re-evaluated by the ACP. All labs, imaging and procedures were evaluated and performed by the ACP and I was available for consultation if any questions in the patients care came up.   I personally made/approved the management plan and take responsibility for the patient management.

## 2024-05-20 NOTE — H&P ADULT - ASSESSMENT
* E Coli bacteremia sec to acute diverticulitis  repeat  bl cx  Ceftriaxone IV  abnormal CBD check MRCP    * Schizophrenia,  HTN

## 2024-05-20 NOTE — H&P ADULT - NSHPLABSRESULTS_GEN_ALL_CORE
12.1   6.60  )-----------( 251      ( 20 May 2024 11:37 )             38.3   05-20    142  |  110<H>  |  13  ----------------------------<  100<H>  3.5   |  28  |  1.22    Ca    9.4      20 May 2024 11:37    TPro  8.6<H>  /  Alb  4.3  /  TBili  0.3  /  DBili  x   /  AST  27  /  ALT  29  /  AlkPhos  123<H>  05-20      US    IMPRESSION:  No gallbladder disease.  Mildly prominent common duct might be age related but should be   correlated with LFTs. If necessary MRCP can be performed.

## 2024-05-20 NOTE — ED STATDOCS - NS_ ATTENDINGSCRIBEDETAILS _ED_A_ED_FT
I, Jose F Crow MD,  performed the initial face to face bedside interview with this patient regarding history of present illness, review of symptoms and relevant past medical, social and family history.  I completed an independent physical examination.  I was the initial provider who evaluated this patient.   I personally saw the patient and performed a substantive portion of the visit including all aspects of the medical decision making.  The history, relevant review of systems, past medical and surgical history, medical decision making, and physical examination was documented by the scribe in my presence and I attest to the accuracy of the documentation.

## 2024-05-20 NOTE — ED STATDOCS - OBJECTIVE STATEMENT
73-year-old male with history of hypertension high cholesterol presents the emergency department for positive blood cultures.  Patient states he was recently seen in the emergency department and diagnosed with diverticulitis.  He was given oral antibiotics however he was called back for positive blood cultures with E. coli. Patient denies fevers chills nausea vomiting states he does have slight diarrhea.  No abdominal pain no dysuria.  Exam here is nonfocal.  Will treat with IV antibiotics check blood work and admit.

## 2024-05-20 NOTE — ED ADULT NURSE NOTE - NS ED NURSE REPORT GIVEN TO FT
Asked Dr Trujillo if he wanted labs collected today. MD ordered CMP, CBC, and Mg.     Also, asked MD if it was OK let pt use bedside commode for pt (pt listed as bedrest). MD stated that this was OK.    Will continue to monitor pt.   merlene mcneal

## 2024-05-21 LAB
ANION GAP SERPL CALC-SCNC: 6 MMOL/L — SIGNIFICANT CHANGE UP (ref 5–17)
BUN SERPL-MCNC: 22 MG/DL — SIGNIFICANT CHANGE UP (ref 7–23)
CALCIUM SERPL-MCNC: 9.6 MG/DL — SIGNIFICANT CHANGE UP (ref 8.5–10.1)
CHLORIDE SERPL-SCNC: 111 MMOL/L — HIGH (ref 96–108)
CO2 SERPL-SCNC: 23 MMOL/L — SIGNIFICANT CHANGE UP (ref 22–31)
CREAT SERPL-MCNC: 1.26 MG/DL — SIGNIFICANT CHANGE UP (ref 0.5–1.3)
CULTURE RESULTS: NO GROWTH — SIGNIFICANT CHANGE UP
EGFR: 60 ML/MIN/1.73M2 — SIGNIFICANT CHANGE UP
GLUCOSE SERPL-MCNC: 110 MG/DL — HIGH (ref 70–99)
HCT VFR BLD CALC: 38.8 % — LOW (ref 39–50)
HGB BLD-MCNC: 12.3 G/DL — LOW (ref 13–17)
MCHC RBC-ENTMCNC: 26.1 PG — LOW (ref 27–34)
MCHC RBC-ENTMCNC: 31.7 GM/DL — LOW (ref 32–36)
MCV RBC AUTO: 82.2 FL — SIGNIFICANT CHANGE UP (ref 80–100)
PLATELET # BLD AUTO: 262 K/UL — SIGNIFICANT CHANGE UP (ref 150–400)
POTASSIUM SERPL-MCNC: 3.9 MMOL/L — SIGNIFICANT CHANGE UP (ref 3.5–5.3)
POTASSIUM SERPL-SCNC: 3.9 MMOL/L — SIGNIFICANT CHANGE UP (ref 3.5–5.3)
RBC # BLD: 4.72 M/UL — SIGNIFICANT CHANGE UP (ref 4.2–5.8)
RBC # FLD: 16.6 % — HIGH (ref 10.3–14.5)
SODIUM SERPL-SCNC: 140 MMOL/L — SIGNIFICANT CHANGE UP (ref 135–145)
SPECIMEN SOURCE: SIGNIFICANT CHANGE UP
WBC # BLD: 6.63 K/UL — SIGNIFICANT CHANGE UP (ref 3.8–10.5)
WBC # FLD AUTO: 6.63 K/UL — SIGNIFICANT CHANGE UP (ref 3.8–10.5)

## 2024-05-21 PROCEDURE — 74183 MRI ABD W/O CNTR FLWD CNTR: CPT | Mod: 26

## 2024-05-21 PROCEDURE — 99233 SBSQ HOSP IP/OBS HIGH 50: CPT

## 2024-05-21 RX ADMIN — OLANZAPINE 5 MILLIGRAM(S): 15 TABLET, FILM COATED ORAL at 22:19

## 2024-05-21 RX ADMIN — Medication 12.5 MILLIGRAM(S): at 17:24

## 2024-05-21 RX ADMIN — CEFTRIAXONE 2000 MILLIGRAM(S): 500 INJECTION, POWDER, FOR SOLUTION INTRAMUSCULAR; INTRAVENOUS at 13:11

## 2024-05-21 RX ADMIN — Medication 12.5 MILLIGRAM(S): at 05:18

## 2024-05-21 RX ADMIN — PANTOPRAZOLE SODIUM 40 MILLIGRAM(S): 20 TABLET, DELAYED RELEASE ORAL at 05:18

## 2024-05-21 RX ADMIN — AMLODIPINE BESYLATE 2.5 MILLIGRAM(S): 2.5 TABLET ORAL at 05:18

## 2024-05-21 RX ADMIN — Medication 3 MILLIGRAM(S): at 22:20

## 2024-05-21 RX ADMIN — ATORVASTATIN CALCIUM 10 MILLIGRAM(S): 80 TABLET, FILM COATED ORAL at 22:19

## 2024-05-21 NOTE — PROGRESS NOTE ADULT - SUBJECTIVE AND OBJECTIVE BOX
Patient is a 73y old  Male who presents with a chief complaint of + bl cx (20 May 2024 12:52)      SUBJECTIVE:   HPI:  Statement: 73-year-old male with history of hypertension high cholesterol presents the emergency department for positive blood cultures.  Patient states he was recently seen in the emergency department and diagnosed with diverticulitis.  He was given oral antibiotics however he was called back for positive blood cultures with E. coli. Patient denies fevers chills nausea vomiting states he does have slight diarrhea.  No abdominal pain no dysuria.  Bl cx sent from ed (20 May 2024 12:52)    sub: no events, afebrile, no complaints to offer        REVIEW OF SYSTEMS:    CONSTITUTIONAL: No weakness, fevers or chills  EYES/ENT: No visual changes;  No vertigo or throat pain   NECK: No pain or stiffness  RESPIRATORY: No cough, wheezing, hemoptysis; No shortness of breath  CARDIOVASCULAR: No chest pain or palpitations  GASTROINTESTINAL: No abdominal or epigastric pain. No nausea, vomiting, or hematemesis; No diarrhea or constipation. No melena or hematochezia.  GENITOURINARY: No dysuria, frequency or hematuria  NEUROLOGICAL: No numbness or weakness  SKIN: No itching, burning, rashes, or lesions   All other review of systems is negative unless indicated above        ICU Vital Signs Last 24 Hrs  T(C): 36.4 (21 May 2024 07:52), Max: 36.5 (20 May 2024 18:11)  T(F): 97.5 (21 May 2024 07:52), Max: 97.7 (20 May 2024 18:11)  HR: 54 (21 May 2024 07:52) (51 - 90)  BP: 131/82 (21 May 2024 07:52) (130/61 - 156/67)  BP(mean): 90 (20 May 2024 20:12) (89 - 90)  ABP: --  ABP(mean): --  RR: 19 (21 May 2024 07:52) (16 - 19)  SpO2: 100% (21 May 2024 07:52) (99% - 100%)    O2 Parameters below as of 21 May 2024 07:52  Patient On (Oxygen Delivery Method): room air            I&O's Summary      CAPILLARY BLOOD GLUCOSE          PHYSICAL EXAM:    Constitutional: NAD, awake and alert,   HEENT: PERR, EOMI, Normal Hearing, MMM  Neck: Soft and supple, No LAD, No JVD  Respiratory: Breath sounds are clear bilaterally, No wheezing, rales or rhonchi  Cardiovascular: S1 and S2, regular rate and rhythm, no Murmurs, gallops or rubs  Gastrointestinal: Bowel Sounds present, soft, nontender, nondistended, no guarding, no rebound  Extremities: No peripheral edema  Vascular: 2+ peripheral pulses  Neurological: A/O x 3, no focal deficits  Musculoskeletal: 5/5 strength b/l upper and lower extremities  Skin: No rashes    MEDICATIONS:  MEDICATIONS  (STANDING):  amLODIPine   Tablet 2.5 milliGRAM(s) Oral daily  atorvastatin 10 milliGRAM(s) Oral at bedtime  cefTRIAXone Injectable. 2000 milliGRAM(s) IV Push every 24 hours  metoprolol tartrate 12.5 milliGRAM(s) Oral two times a day  OLANZapine 5 milliGRAM(s) Oral at bedtime  pantoprazole    Tablet 40 milliGRAM(s) Oral before breakfast      LABS: All Labs Reviewed:                        12.3   6.63  )-----------( 262      ( 21 May 2024 07:37 )             38.8     05-21    140  |  111<H>  |  22  ----------------------------<  110<H>  3.9   |  23  |  1.26    Ca    9.6      21 May 2024 07:37    TPro  8.6<H>  /  Alb  4.3  /  TBili  0.3  /  DBili  x   /  AST  27  /  ALT  29  /  AlkPhos  123<H>  05-20    PT/INR - ( 20 May 2024 13:32 )   PT: 11.0 sec;   INR: 0.97 ratio         PTT - ( 20 May 2024 13:32 )  PTT:31.0 sec          Blood Culture: 05-17 @ 01:31  Organism --  Gram Stain Blood -- Gram Stain --  Specimen Source .Blood None  Culture-Blood --    05-17 @ 00:26  Organism Blood Culture PCR  Gram Stain Blood -- Gram Stain   Growth in anaerobic bottle: Gram Negative Rods  Specimen Source .Blood None  Culture-Blood --        RADIOLOGY/EKG: reviewed

## 2024-05-21 NOTE — PROGRESS NOTE ADULT - ASSESSMENT
* E Coli bacteremia sec to acute diverticulitis  - call back 5/17/24  - repeat cultures underway  - cont ceftriaxone  - CTAP: acute divertic with 9mm left kidney lesion, early cirrhosis and dilated CBD  - MRCP pending  - LFTs wnl      * Schizophrenia,    - HTN       DVT px: SCDs  Time spent: 59 min

## 2024-05-22 ENCOUNTER — TRANSCRIPTION ENCOUNTER (OUTPATIENT)
Age: 74
End: 2024-05-22

## 2024-05-22 VITALS — WEIGHT: 132.94 LBS

## 2024-05-22 LAB
CULTURE RESULTS: SIGNIFICANT CHANGE UP
SPECIMEN SOURCE: SIGNIFICANT CHANGE UP

## 2024-05-22 PROCEDURE — 99239 HOSP IP/OBS DSCHRG MGMT >30: CPT

## 2024-05-22 RX ADMIN — AMLODIPINE BESYLATE 2.5 MILLIGRAM(S): 2.5 TABLET ORAL at 10:49

## 2024-05-22 RX ADMIN — PANTOPRAZOLE SODIUM 40 MILLIGRAM(S): 20 TABLET, DELAYED RELEASE ORAL at 06:17

## 2024-05-22 RX ADMIN — CEFTRIAXONE 2000 MILLIGRAM(S): 500 INJECTION, POWDER, FOR SOLUTION INTRAMUSCULAR; INTRAVENOUS at 13:45

## 2024-05-22 NOTE — DISCHARGE NOTE NURSING/CASE MANAGEMENT/SOCIAL WORK - PATIENT PORTAL LINK FT
You can access the FollowMyHealth Patient Portal offered by [facility] by registering at the following website: [email or web address]. By joining [employer]’s FollowMyHealth portal, you will also be able to view your health information using other applications (apps) compatible with our system.

## 2024-05-22 NOTE — DIETITIAN INITIAL EVALUATION ADULT - ADD RECOMMEND
1) C/w Regular diet  2) Add ensure plus high protein daily to optimize PO intake (provides 350 kcal, 20g protein/ shake)   3) Obtain vitamin D 25OH level to assess nutriture  4) Please obtain weekly weights  5) Consider adding thiamine 100 mg daily 2/2 poor PO intake/ malnutrition  6) MVI w/ minerals daily to ensure 100% RDA met  7) Encourage protein-rich foods, maximize food preferences  8) Monitor bowel movements, if no BM for >3 days, consider implementing bowel regimen.  9) Confirm goals of care regarding nutrition support  RD will continue to monitor PO intake, labs, hydration, and wt prn.

## 2024-05-22 NOTE — DISCHARGE NOTE PROVIDER - HOSPITAL COURSE
HPI:  Statement: 73-year-old male with history of hypertension high cholesterol presents the emergency department for positive blood cultures.  Patient states he was recently seen in the emergency department and diagnosed with diverticulitis.  He was given oral antibiotics however he was called back for positive blood cultures with E. coli. Patient denies fevers chills nausea vomiting states he does have slight diarrhea.  No abdominal pain no dysuria.  Bl cx sent from ed (20 May 2024 12:52)    sub: no events, afebrile, no complaints to offer  PHYSICAL EXAM:  ICU Vital Signs Last 24 Hrs  T(C): 36.7 (22 May 2024 07:36), Max: 36.7 (22 May 2024 07:36)  T(F): 98.1 (22 May 2024 07:36), Max: 98.1 (22 May 2024 07:36)  HR: 62 (22 May 2024 10:47) (51 - 62)  BP: 112/85 (22 May 2024 10:47) (112/85 - 140/68)  BP(mean): --  ABP: --  ABP(mean): --  RR: 18 (22 May 2024 07:36) (18 - 19)  SpO2: 98% (22 May 2024 07:36) (98% - 99%)    O2 Parameters below as of 22 May 2024 07:36  Patient On (Oxygen Delivery Method): room air          Constitutional: NAD, awake and alert,   HEENT: PERR, EOMI, Normal Hearing, MMM  Neck: Soft and supple, No LAD, No JVD  Respiratory: Breath sounds are clear bilaterally, No wheezing, rales or rhonchi  Cardiovascular: S1 and S2, regular rate and rhythm, no Murmurs, gallops or rubs  Gastrointestinal: Bowel Sounds present, soft, nontender, nondistended, no guarding, no rebound  Extremities: No peripheral edema  Vascular: 2+ peripheral pulses  Neurological: A/O x 3, no focal deficits  Musculoskeletal: 5/5 strength b/l upper and lower extremities  Skin: No rashes  LABS: All Labs Reviewed:    < from: MR MRCP w/wo IV Cont (05.21.24 @ 21:54) >    KIDNEYS/URETERS: Symmetric nephrograms. No hydronephrosis. No solid or   enhancing renal mass.    < end of copied text >    < from: MR MRCP w/wo IV Cont (05.21.24 @ 21:54) >    IMPRESSION:  *  No biliary dilatation or choledocholithiasis.  *  Early changes of liver cirrhosis with mild atrophy and widened   fissures.    < end of copied text >      Assessment and Plan:   · Assessment        * E Coli bacteremia sec to acute diverticulitis  - call back 5/17/24  - repeat cultures : NGTD  - completed 3 days of po abx (sensitive to pcn)  - ceftriaxone d#3  - dc on 9 more days of po abx  - o/p c scope in 4-6 weeks  - pt aware of diagnosis of cirrhosis, advised to follow up with gi for serial monitoring, cirrhosis compensated at present  - advised to avoid etoh   - MRCP as above, no CBD dilation. No kidneys  - LFTs wnl      * Schizophrenia,    - HTN       dc to home: 75 min

## 2024-05-22 NOTE — DIETITIAN INITIAL EVALUATION ADULT - PERTINENT MEDS FT
MEDICATIONS  (STANDING):  amLODIPine   Tablet 2.5 milliGRAM(s) Oral daily  atorvastatin 10 milliGRAM(s) Oral at bedtime  cefTRIAXone Injectable. 2000 milliGRAM(s) IV Push every 24 hours  metoprolol tartrate 12.5 milliGRAM(s) Oral two times a day  OLANZapine 5 milliGRAM(s) Oral at bedtime  pantoprazole    Tablet 40 milliGRAM(s) Oral before breakfast    MEDICATIONS  (PRN):  acetaminophen     Tablet .. 650 milliGRAM(s) Oral every 6 hours PRN Temp greater or equal to 38C (100.4F), Mild Pain (1 - 3)  aluminum hydroxide/magnesium hydroxide/simethicone Suspension 30 milliLiter(s) Oral every 4 hours PRN Dyspepsia  melatonin 3 milliGRAM(s) Oral at bedtime PRN Insomnia  ondansetron Injectable 4 milliGRAM(s) IV Push every 8 hours PRN Nausea and/or Vomiting    Home Medications:  amLODIPine 2.5 mg oral tablet: 1 tab(s) orally once a day (20 May 2024 13:17)  atorvastatin 10 mg oral tablet: 1 tab(s) orally once a day (at bedtime) (20 May 2024 13:17)  metoprolol tartrate 25 mg oral tablet: 0.5 tab(s) orally 2 times a day (20 May 2024 13:16)  OLANZapine 5 mg oral tablet: 1 tab(s) orally once a day (at bedtime) (20 May 2024 13:17)  omeprazole 40 mg oral delayed release capsule: 1 cap(s) orally once a day (20 May 2024 13:17)

## 2024-05-22 NOTE — DISCHARGE NOTE PROVIDER - DETAILS OF MALNUTRITION DIAGNOSIS/DIAGNOSES
This patient has been assessed with a concern for Malnutrition and was treated during this hospitalization for the following Nutrition diagnosis/diagnoses:     -  05/22/2024: Moderate protein-calorie malnutrition

## 2024-05-22 NOTE — DIETITIAN INITIAL EVALUATION ADULT - MALNUTRITION
Pt meets criteria for moderate malnutrition in context of chronic illness  Pt meets criteria for moderate malnutrition in context of acute on chronic illness

## 2024-05-22 NOTE — DISCHARGE NOTE PROVIDER - NSDCMRMEDTOKEN_GEN_ALL_CORE_FT
amLODIPine 2.5 mg oral tablet: 1 tab(s) orally once a day  amoxicillin-clavulanate 875 mg-125 mg oral tablet: 875 milligram(s) orally 2 times a day  atorvastatin 10 mg oral tablet: 1 tab(s) orally once a day (at bedtime)  metoprolol tartrate 25 mg oral tablet: 0.5 tab(s) orally 2 times a day  OLANZapine 5 mg oral tablet: 1 tab(s) orally once a day (at bedtime)  omeprazole 40 mg oral delayed release capsule: 1 cap(s) orally once a day

## 2024-05-22 NOTE — DIETITIAN INITIAL EVALUATION ADULT - ORAL INTAKE PTA/DIET HISTORY
Reports poor po intake for ~1 week pta d/t diverticulitis flare up. Pt states that he has been dealing with diverticulitis pain and gas pain over the last few year so his intake has fluctuated. Per diet recall, pt consuming <75% of ENN chronically d/t flare ups. Normally eats 3 meals/ day when he is eating well with snacks. Does not follow any diet restrictions, but tries to eat a lot of fruit and vegetables when he is feeling well. Does not consume any ONS.

## 2024-05-22 NOTE — DISCHARGE NOTE NURSING/CASE MANAGEMENT/SOCIAL WORK - NSDCVIVACCINE_GEN_ALL_CORE_FT
influenza, injectable, quadrivalent, preservative free; 04-Sep-2019 12:23; Miryam Ma (RN); AgLocal; [id number] (Exp. Date: 30-Jun-2020); IntraMuscular; Deltoid Left.; 0.5 milliLiter(s); VIS (VIS Published: 15-Aug-2019, VIS Presented: 04-Sep-2019);

## 2024-05-22 NOTE — DIETITIAN INITIAL EVALUATION ADULT - PERTINENT LABORATORY DATA
05-21    140  |  111<H>  |  22  ----------------------------<  110<H>  3.9   |  23  |  1.26    Ca    9.6      21 May 2024 07:37    Iron Total, Serum: 37 ug/dL (05-23-23 @ 08:39)

## 2024-05-22 NOTE — DISCHARGE NOTE PROVIDER - CARE PROVIDER_API CALL
Bushra Rodrigez  Internal Medicine  [address] [place] [id number]  Phone: [phone]  Fax: [phone]  Follow Up Time:

## 2024-05-22 NOTE — DIETITIAN INITIAL EVALUATION ADULT - OTHER INFO
72 y/o M with a PMHx of hypertension and high cholesterol presented the emergency department for positive blood cultures. Patient states he was recently seen in the emergency department and diagnosed with diverticulitis. He was given oral antibiotics however he was called back for positive blood cultures with E. coli. Patient denies fevers chills nausea vomiting states he does have slight diarrhea. Admitted for E Coli bacteremia sec to acute diverticulitis.    Visited pt this morning, pt sitting on side of bed eating breakfast; ~50% of breakfast tray consumed at time of visit. States that since he was admitted, his appetite/ intake has improved and has been consuming % of his meals. Reports that his UBW is 133# which he believes was stable prior to the past week since his PO intake has declined. Endorses some weight loss, however is unsure how much weight he lost. RD unable to obtain bedscale wt at time of visit as pt sitting on edge of bed with legs off the bed. No weight hx to review and no scale weight taken this admission. Pt thin appearing. NFPE reveals mild to severe muscle/ fat wasting, pt meets criteria for PCM at this time. C/w Regular diet. Encouraged high kcal/ high prot intake, pt receptive to trialing Ensure Plus High Protein daily in effort to optimize PO intake. Please see additional recommendations below.

## 2024-05-31 DIAGNOSIS — R78.81 BACTEREMIA: ICD-10-CM

## 2024-05-31 DIAGNOSIS — K57.92 DIVERTICULITIS OF INTESTINE, PART UNSPECIFIED, WITHOUT PERFORATION OR ABSCESS WITHOUT BLEEDING: ICD-10-CM

## 2024-05-31 DIAGNOSIS — R00.1 BRADYCARDIA, UNSPECIFIED: ICD-10-CM

## 2024-05-31 DIAGNOSIS — B96.20 UNSPECIFIED ESCHERICHIA COLI [E. COLI] AS THE CAUSE OF DISEASES CLASSIFIED ELSEWHERE: ICD-10-CM

## 2024-05-31 DIAGNOSIS — E78.00 PURE HYPERCHOLESTEROLEMIA, UNSPECIFIED: ICD-10-CM

## 2024-05-31 DIAGNOSIS — E44.0 MODERATE PROTEIN-CALORIE MALNUTRITION: ICD-10-CM

## 2024-05-31 DIAGNOSIS — I10 ESSENTIAL (PRIMARY) HYPERTENSION: ICD-10-CM

## 2024-05-31 DIAGNOSIS — F20.9 SCHIZOPHRENIA, UNSPECIFIED: ICD-10-CM

## 2024-05-31 DIAGNOSIS — K74.60 UNSPECIFIED CIRRHOSIS OF LIVER: ICD-10-CM

## 2024-06-07 ENCOUNTER — APPOINTMENT (OUTPATIENT)
Dept: HOME HEALTH SERVICES | Facility: HOME HEALTH | Age: 74
End: 2024-06-07
Payer: MEDICARE

## 2024-06-07 VITALS
OXYGEN SATURATION: 96 % | DIASTOLIC BLOOD PRESSURE: 64 MMHG | BODY MASS INDEX: 24.75 KG/M2 | RESPIRATION RATE: 16 BRPM | WEIGHT: 131 LBS | HEART RATE: 58 BPM | SYSTOLIC BLOOD PRESSURE: 130 MMHG

## 2024-06-07 DIAGNOSIS — K21.9 GASTRO-ESOPHAGEAL REFLUX DISEASE W/OUT ESOPHAGITIS: ICD-10-CM

## 2024-06-07 DIAGNOSIS — I10 ESSENTIAL (PRIMARY) HYPERTENSION: ICD-10-CM

## 2024-06-07 DIAGNOSIS — K57.32 DIVERTICULITIS OF LARGE INTESTINE W/OUT PERFORATION OR ABSCESS W/OUT BLEEDING: ICD-10-CM

## 2024-06-07 DIAGNOSIS — F20.9 SCHIZOPHRENIA, UNSPECIFIED: ICD-10-CM

## 2024-06-07 DIAGNOSIS — E78.5 HYPERLIPIDEMIA, UNSPECIFIED: ICD-10-CM

## 2024-06-07 DIAGNOSIS — K59.00 CONSTIPATION, UNSPECIFIED: ICD-10-CM

## 2024-06-07 PROCEDURE — 99344 HOME/RES VST NEW MOD MDM 60: CPT

## 2024-06-07 RX ORDER — AMLODIPINE BESYLATE 2.5 MG/1
2.5 TABLET ORAL DAILY
Qty: 90 | Refills: 3 | Status: ACTIVE | COMMUNITY
Start: 2024-06-07

## 2024-06-07 RX ORDER — ATORVASTATIN CALCIUM 10 MG/1
10 TABLET, FILM COATED ORAL
Refills: 0 | Status: ACTIVE | COMMUNITY
Start: 2024-06-07

## 2024-06-07 RX ORDER — OLANZAPINE 5 MG/1
5 TABLET, FILM COATED ORAL
Qty: 30 | Refills: 0 | Status: DISCONTINUED | COMMUNITY
Start: 2022-09-28 | End: 2024-06-07

## 2024-06-07 RX ORDER — AMOXICILLIN AND CLAVULANATE POTASSIUM 875; 125 MG/1; MG/1
875-125 TABLET, COATED ORAL
Qty: 14 | Refills: 0 | Status: ACTIVE | COMMUNITY
Start: 2024-06-07

## 2024-06-07 RX ORDER — OMEPRAZOLE 40 MG/1
40 CAPSULE, DELAYED RELEASE ORAL
Refills: 0 | Status: DISCONTINUED | COMMUNITY
End: 2024-06-07

## 2024-06-07 RX ORDER — AMLODIPINE BESYLATE 5 MG/1
5 TABLET ORAL
Refills: 0 | Status: DISCONTINUED | COMMUNITY
End: 2024-06-07

## 2024-06-07 RX ORDER — APIXABAN 5 MG/1
5 TABLET, FILM COATED ORAL
Refills: 0 | Status: DISCONTINUED | COMMUNITY
End: 2024-06-07

## 2024-06-07 RX ORDER — METOPROLOL TARTRATE 25 MG/1
25 TABLET, FILM COATED ORAL TWICE DAILY
Qty: 90 | Refills: 1 | Status: ACTIVE | COMMUNITY

## 2024-06-07 RX ORDER — ATORVASTATIN CALCIUM 10 MG/1
10 TABLET, FILM COATED ORAL
Refills: 0 | Status: DISCONTINUED | COMMUNITY
End: 2024-06-07

## 2024-06-07 RX ORDER — OLANZAPINE 5 MG/1
5 TABLET, FILM COATED ORAL DAILY
Refills: 0 | Status: ACTIVE | COMMUNITY
Start: 2024-06-07

## 2024-06-07 RX ORDER — OMEPRAZOLE 40 MG/1
40 CAPSULE, DELAYED RELEASE ORAL DAILY
Refills: 0 | Status: ACTIVE | COMMUNITY
Start: 2024-06-07

## 2024-06-07 RX ORDER — PYRITHIONE ZINC 1 G/ML
LOTION/SHAMPOO TOPICAL DAILY
Refills: 0 | Status: ACTIVE | COMMUNITY
Start: 2024-06-07

## 2024-06-07 NOTE — PHYSICAL EXAM
[No Acute Distress] : no acute distress [Normal Voice/Communication] : normal voice communication [Normal Sclera/Conjunctiva] : normal sclera/conjunctiva [PERRL] : pupils equal, round and reactive to light [Normal Outer Ear/Nose] : the ears and nose were normal in appearance [Normal Oropharynx] : the oropharynx was normal [No JVD] : no jugular venous distention [Supple] : the neck was supple [No Respiratory Distress] : no respiratory distress [Clear to Auscultation] : lungs were clear to auscultation bilaterally [No Accessory Muscle Use] : no accessory muscle use [Normal Rate] : heart rate was normal  [Regular Rhythm] : with a regular rhythm [Normal S1, S2] : normal S1 and S2 [Pedal Pulses Present] : the pedal pulses are present [No Edema] : there was no peripheral edema [Normal Bowel Sounds] : normal bowel sounds [Non Tender] : non-tender [Soft] : abdomen soft [Not Distended] : not distended [No CVA Tenderness] : no ~M costovertebral angle tenderness [No Spinal Tenderness] : no spinal tenderness [Normal Gait] : normal gait [No Joint Swelling] : no joint swelling seen [No Clubbing, Cyanosis] : no clubbing  or cyanosis of the fingernails [Normal Strength/Tone] : muscle strength and tone were normal [No Rash] : no rash [Cranial Nerves Intact] : cranial nerves 2-12 were intact [No Motor Deficits] : the motor exam was normal [Oriented x3] : oriented to person, place, and time [Normal Affect] : the affect was normal [Normal Mood] : the mood was normal [Normal Insight/Judgement] : insight and judgment were intact [Kyphosis] : no kyphosis present [de-identified] : no thrush [de-identified] : sacrum/heels intact [de-identified] : CN 2-12 grossly intacrt

## 2024-06-07 NOTE — REASON FOR VISIT
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

## 2024-06-07 NOTE — HEALTH RISK ASSESSMENT
[HRA Reviewed] : Health risk assessment reviewed [Independent] : managing finances [Some assistance needed] : using transportation [No falls in past year] : Patient reported no falls in the past year [No] : The patient does not have visual impairment [FreeTextEntry2] : does not drive friend takes to supermarket etc [FreeTextEntry6] : does not drive, friend takes to appts [de-identified] : reading glasses

## 2024-06-07 NOTE — HISTORY OF PRESENT ILLNESS
[Patient is stable - had PCP appoinment] : patient is stable - had PCP appointment [Patient] : patient [LastPVisitDate] : 05/2024 [FreeTextEntry4] : Dr. Ben Marx GI [FreeTextEntry1] : Patient wishes to keep PCP and has regular follow up with his providers [facility]: [facility] for Schizophreni next appt 6/18 Podiatry next appt 7/25 PCP next appt 7/23  Hospital: HTN Admit:  Septic shock/pressors  2/2 acute cholangitis HTN ED T&R 5/16/24: abd pain, diverticulitis HTN Admit 5/20-5/2/24: Called back for + Blood cultures 2/2 diverticulitis  In process of applying for Medicaid home aide [FreeTextEntry2] : PMH: HTN, HLD, schizophrenia, GERD, early iiver cirrhosis, diverticulitis, acute cholangitis, bacteremia, Portal Vein Thrombosis 2023 s/p 6 months eliquis therapy  Labs: May 2024 [facility]: WBC 6.6, H/H 12.3/38.8, Plt 262 Na 140, K 3.9, Cl 111, CO2 23, Bun 22, Cr 1.26 GFR 60 Ca 9.6 TP 8.6, Alb 4.3, AST 27/ALT 29, TB 0.3   Pleasant 72 y/o male PMH as above, A&O x 3 NAD. In chair for visit. [place] his was 9 years old. Discharged from [facility] on 5/22 for Ecoli + bacteremia 2/2 diverticulitis. He has f/u appt for GI and in process of scheduling colonoscopy. He has no abd complaints at present and will take last dose Augmentin this evening. HTN: BP controlled on current meds. HLD: on statin. GERD: PPI, asymptomatic at present. Taking all meds as prescribed. Saw PCP last month and has f/u visit scheduled on 7/23.   Appetite: good appetite, no dysphagia  BM: reports intermittent constipation, takes fiber gummies daily with good effect. + BM today  Urine: no urinary issues/incontinence  Sleep: sleeps well   Pain: c/o intermittent pain to L knee   Skin: intact, no breakdown  Ambulation: Independent  Falls: No falls  Behavior/ Mood - any agitation

## 2024-08-18 NOTE — ED PROVIDER NOTE - DURATION
"Subjective:      Patient ID: Marleen Samano is a 78 y.o. female.    Vitals:  height is 5' 9" (1.753 m) and weight is 73.5 kg (162 lb). Her oral temperature is 98.6 °F (37 °C). Her blood pressure is 146/75 (abnormal) and her pulse is 84. Her respiration is 18 and oxygen saturation is 96%.     Chief Complaint: Dysuria    Dysuria   The current episode started in the past 7 days. The quality of the pain is described as burning. Associated symptoms include frequency and urgency. Pertinent negatives include no nausea or vomiting. Treatments tried: azo.       Constitution: Negative for fever.   Gastrointestinal:  Negative for abdominal pain, nausea and vomiting.   Genitourinary:  Positive for dysuria, frequency and urgency.      Objective:     Past Medical History:   Diagnosis Date    A-fib     Anemia due to multiple mechanisms 02/10/2020    Anemia in chronic kidney disease (CKD)     Anemia, chronic disease 02/10/2020    Arthritis     Aseptic loosening of prosthetic knee, initial encounter 07/14/2020    Bell's palsy     Bladder incontinence     Blepharospasm     Bronchiectasis without complication 10/08/2019    Cervical dystonia     Concussion     COPD (chronic obstructive pulmonary disease)     Enterocolitis 02/07/2020    Fainting spell     2/7/2020    Hormone deficiency     Hypertension     Loose right total knee arthroplasty 06/15/2020    Migraine     Muscle spasm     Myofacial pain syndrome     Normocytic normochromic anemia 02/10/2020    Right ear pain 02/09/2021    Squamous cell carcinoma of skin     Syncope and collapse 02/09/2021       Past Surgical History:   Procedure Laterality Date    APPENDECTOMY      BREAST BIOPSY      BREAST LUMPECTOMY      CATARACT EXTRACTION      COLONOSCOPY N/A 04/06/2023    Procedure: COLONOSCOPY;  Surgeon: Marino Munson MD;  Location: [facility];  Service: Endoscopy;  Laterality: N/A;    ESOPHAGOGASTRODUODENOSCOPY N/A 04/06/2023    Procedure: EGD (ESOPHAGOGASTRODUODENOSCOPY);  Surgeon: " Marino Munson MD;  Location: [facility] ENDO;  Service: Endoscopy;  Laterality: N/A;    EYE SURGERY      HYSTERECTOMY      NECK SURGERY      REVISION OF KNEE ARTHROPLASTY Right 07/14/2020    Procedure: REVISION, ARTHROPLASTY, KNEE;  Surgeon: Pedro Tompkins MD;  Location: [facility] OR;  Service: Orthopedics;  Laterality: Right;    ROOT CANAL  01/09/2024    TONSILLECTOMY         Family History   Problem Relation Name Age of Onset    Cancer Mother      Heart disease Brother      Parkinsonism Brother      Diabetes Neg Hx      Melanoma Neg Hx      Psoriasis Neg Hx      Lupus Neg Hx      Eczema Neg Hx      Glaucoma Neg Hx      Macular degeneration Neg Hx         Social History     Socioeconomic History    Marital status:    Occupational History    Occupation: Caregiver   Tobacco Use    Smoking status: Never    Smokeless tobacco: Never   Substance and Sexual Activity    Alcohol use: No    Drug use: No   Social History Narrative    Social hx: 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 Has a good support system of friends, daughter and grandchildren. Keeps herself busy w/ gardening, socializing. Facing some financial issues, supports grandson struggling w/ drug abuse who is staying with her which causes stress. Denies any concern regarding safety at home. Has not had much financial support from her family w/ caring for her grandson.     Social Determinants of Health     Financial Resource Strain: Low Risk  (7/5/2024)    Overall Financial Resource Strain (CARDIA)     Difficulty of Paying Living Expenses: Not very hard   Food Insecurity: No Food Insecurity (7/5/2024)    Hunger Vital Sign     Worried About Running Out of Food in the Last Year: Never true     Ran Out of Food in the Last Year: Never true   Transportation Needs: No Transportation Needs (4/14/2022)    PRAPARE - Transportation     Lack of Transportation (Medical): No     Lack of Transportation (Non-Medical): No   Physical  Activity: Inactive (4/14/2022)    Exercise Vital Sign     Days of Exercise per Week: 0 days     Minutes of Exercise per Session: 0 min   Stress: Stress Concern Present (7/5/2024)    [employer] [employer] - Occupational Stress Questionnaire     Feeling of Stress : Very much   Housing Stability: Low Risk  (4/14/2022)    Housing Stability Vital Sign     Unable to Pay for Housing in the Last Year: No     Number of Places Lived in the Last Year: 1     Unstable Housing in the Last Year: No       Current Outpatient Medications   Medication Sig Dispense Refill    albuterol (PROVENTIL/VENTOLIN HFA) 90 mcg/actuation inhaler INHALE 2 INHALATIONS BY MOUTH  EVERY 6 HOURS AS NEEDED FOR  WHEEZING 51 g 2    albuterol-ipratropium (DUO-NEB) 2.5 mg-0.5 mg/3 mL nebulizer solution USE 1 VIAL VIA NEBULIZER  EVERY 6 HOURS AS NEEDED FOR WHEEZING RESCUE 180 mL 23    amLODIPine (NORVASC) 2.5 MG tablet TAKE 1 TABLET BY MOUTH ONCE  DAILY 90 tablet 2    aspirin 81 MG Chew Take 81 mg by mouth once daily.      baclofen (LIORESAL) 20 MG tablet TAKE 1 TABLET BY MOUTH  TWICE DAILY 180 tablet 3    benazepriL (LOTENSIN) 40 MG tablet TAKE 1 TABLET BY MOUTH ONCE  DAILY 90 tablet 2    clindamycin (CLEOCIN) 300 MG capsule Take 300 mg by mouth 3 (three) times daily.      gabapentin (NEURONTIN) 600 MG tablet Take 1 tablet (600 mg total) by mouth daily as needed. 270 tablet 3    omeprazole (PRILOSEC) 40 MG capsule TAKE 1 CAPSULE BY MOUTH TWICE  DAILY BEFORE MEALS 180 capsule 2    traZODone (DESYREL) 100 MG tablet Take 100 mg by mouth every evening.      venlafaxine (EFFEXOR-XR) 150 MG Cp24 Take 1 capsule (150 mg total) by mouth 2 (two) times a day. 14 capsule 0    blood-glucose meter kit To check BG 1 times daily, to use with insurance preferred meter 1 each 0    fluconazole (DIFLUCAN) 150 MG Tab Take 1 tablet (150 mg total) by mouth once daily. for 1 day 1 tablet 0    metoprolol succinate (TOPROL-XL) 25 MG 24 hr tablet Take 1 tablet (25  mg total) by mouth once daily. 90 tablet 3    nitrofurantoin, macrocrystal-monohydrate, (MACROBID) 100 MG capsule Take 1 capsule (100 mg total) by mouth 2 (two) times daily. for 7 days 14 capsule 0     No current facility-administered medications for this visit.       Review of patient's allergies indicates:   Allergen Reactions    Adhesive tape-silicones Rash     Blisters after on for several hours    Augmentin [amoxicillin-pot clavulanate]      thrush    Ciprofloxacin      thrush    Morphine Itching and Hives       Physical Exam   Constitutional: She is oriented to person, place, and time.   HENT:   Head: Normocephalic.   Eyes: Conjunctivae are normal.   Cardiovascular: Normal rate.   Pulmonary/Chest: Effort normal.   Abdominal: Normal appearance. There is no left CVA tenderness and no right CVA tenderness.   Musculoskeletal: Normal range of motion.         General: Normal range of motion.   Neurological: She is alert and oriented to person, place, and time.   Skin: Skin is no rash.   Psychiatric: Her behavior is normal. Mood, judgment and thought content normal.   Nursing note and vitals reviewed.      Assessment:     1. Dysuria    2. Acute cystitis with hematuria        Plan:       Dysuria  -     POCT Urinalysis, Dipstick, Manual, W/O Scope    Acute cystitis with hematuria  -     CULTURE, URINE    Other orders  -     nitrofurantoin, macrocrystal-monohydrate, (MACROBID) 100 MG capsule; Take 1 capsule (100 mg total) by mouth 2 (two) times daily. for 7 days  Dispense: 14 capsule; Refill: 0  -     fluconazole (DIFLUCAN) 150 MG Tab; Take 1 tablet (150 mg total) by mouth once daily. for 1 day  Dispense: 1 tablet; Refill: 0        UTI without evidence of pyelonephritis. Culture sent, started on Macrobid. Return precautions discussed              today

## 2024-08-18 NOTE — ED ADULT TRIAGE NOTE - AS HEIGHT TYPE
Care Management Follow Up    Length of Stay (days): 2  Expected Discharge Date: 08/19/2024  Concerns to be Addressed: PEGGY & Health Care Directive      Patient plan of care discussed at interdisciplinary rounds: Yes  Anticipated Discharge Disposition: Home   Anticipated Discharge Services: None   Anticipated Discharge DME: None   Patient/family educated on Medicare website which has current facility and service quality ratings: yes  Education Provided on the Discharge Plan: yes   Patient/Family in Agreement with the Plan: yes   Referrals Placed by CM/SW: N/A   Private pay costs discussed: Not applicable  Additional Information: At request of bedside nurse, pt provided with an [employer] Release of Information form & Health Care Directive. Pt wishes to list his wife & daughter as people who can receive his PHI in written and verbal form. Care management team to follow-up with the pt tomorrow to obtain the completed PEGGY & get his health care directive notarized.  __________________________     BETSY Jewell, SARINA  Clinical , [facility]-  Desk Phone: [phone]   Securely message with [employer]) (Search Name or 7C Med Surg [id number] )  Text page via [facility] Paging/Directory   See signed in provider for up to date coverage information  Social Work & Care Management Department        stated

## 2024-09-26 ENCOUNTER — NON-APPOINTMENT (OUTPATIENT)
Age: 74
End: 2024-09-26

## 2024-09-26 DIAGNOSIS — B35.3 TINEA PEDIS: ICD-10-CM

## 2024-09-26 DIAGNOSIS — M79.671 PAIN IN RIGHT FOOT: ICD-10-CM

## 2024-09-26 DIAGNOSIS — I73.89 OTHER SPECIFIED PERIPHERAL VASCULAR DISEASES: ICD-10-CM

## 2024-09-26 DIAGNOSIS — Z78.9 OTHER SPECIFIED HEALTH STATUS: ICD-10-CM

## 2024-09-26 DIAGNOSIS — M79.672 PAIN IN LEFT FOOT: ICD-10-CM

## 2024-09-26 DIAGNOSIS — Z86.39 PERSONAL HISTORY OF OTHER ENDOCRINE, NUTRITIONAL AND METABOLIC DISEASE: ICD-10-CM

## 2024-09-26 DIAGNOSIS — B35.1 TINEA UNGUIUM: ICD-10-CM

## 2024-09-26 RX ORDER — KETOCONAZOLE 20 MG/G
2 CREAM TOPICAL
Refills: 0 | Status: ACTIVE | COMMUNITY

## 2024-10-02 ENCOUNTER — APPOINTMENT (OUTPATIENT)
Dept: HOME HEALTH SERVICES | Facility: HOME HEALTH | Age: 74
End: 2024-10-02
Payer: MEDICARE

## 2024-10-02 VITALS
OXYGEN SATURATION: 97 % | SYSTOLIC BLOOD PRESSURE: 138 MMHG | BODY MASS INDEX: 24.19 KG/M2 | WEIGHT: 128 LBS | DIASTOLIC BLOOD PRESSURE: 68 MMHG | HEART RATE: 58 BPM | RESPIRATION RATE: 16 BRPM

## 2024-10-02 DIAGNOSIS — E78.5 HYPERLIPIDEMIA, UNSPECIFIED: ICD-10-CM

## 2024-10-02 DIAGNOSIS — K21.9 GASTRO-ESOPHAGEAL REFLUX DISEASE W/OUT ESOPHAGITIS: ICD-10-CM

## 2024-10-02 DIAGNOSIS — I10 ESSENTIAL (PRIMARY) HYPERTENSION: ICD-10-CM

## 2024-10-02 DIAGNOSIS — K59.00 CONSTIPATION, UNSPECIFIED: ICD-10-CM

## 2024-10-02 DIAGNOSIS — F20.9 SCHIZOPHRENIA, UNSPECIFIED: ICD-10-CM

## 2024-10-02 PROCEDURE — 99349 HOME/RES VST EST MOD MDM 40: CPT

## 2024-10-02 RX ORDER — TERBINAFINE HYDROCHLORIDE 250 MG/1
250 TABLET ORAL
Refills: 0 | Status: DISCONTINUED | COMMUNITY
End: 2024-10-02

## 2024-10-02 NOTE — HISTORY OF PRESENT ILLNESS
[Patient is stable - had PCP appoinment] : patient is stable - had PCP appointment [Patient] : patient [[employer]s Co-Management Patient] : [unfilled] is a [employer]s [employer]-management patient [LastPVisitDate] : 05/2024 [FreeTextEntry4] : Dr. Ben Marx GI [LastSpecialistVisitDate] : 7/2024 [FreeTextEntry1] : Patient wishes to keep PCP and has regular follow up with his providers Psych: [facility] Leaisha Scott for Schizophreni next appt 10/15  Podiatry next appt 7/25,10/17 PCP next appt 7/23. 10/22  Hospital: HTN Admit:  Septic shock/pressors  2/2 acute cholangitis HTN ED T&R 5/16/24: abd pain, diverticulitis HTN Admit 5/20-5/2/24: Called back for + Blood cultures 2/2 diverticulitis  + Medicaid HHA in place 15hr per week. Unsure of agency [FreeTextEntry2] : PMH: HTN, HLD, schizophrenia, GERD, early iiver cirrhosis, diverticulitis, acute cholangitis, bacteremia, Portal Vein Thrombosis 2023 s/p 6 months eliquis therapy  Labs: May 2024 [facility]: WBC 6.6, H/H 12.3/38.8, Plt 262 Na 140, K 3.9, Cl 111, CO2 23, Bun 22, Cr 1.26 GFR 60 Ca 9.6 TP 8.6, Alb 4.3, AST 27/ALT 29, TB 0.3   Pleasant 72 y/o male PMH as above, A&O x 3 NAD. In chair for visit. [place] his was 9 years old. Discharged from [facility] on 5/22 for Ecoli + bacteremia 2/2 diverticulitis. He has f/u appt for GI and in process of scheduling colonoscopy which was done July 2024 & reported normal.    Appetite: good appetite, no dysphagia  BM: reports intermittent constipation, takes fiber gummies daily with good effect. + BM today  Urine: no urinary issues/incontinence  Sleep: sleeps well   Pain: c/o intermittent pain to L knee   Skin: intact, no breakdown  Ambulation: Independent  Falls: No falls  Behavior/ Mood - any agitation: mood is good  Offers no acute complaints at this time. Has regular f/u with his doctors

## 2024-10-02 NOTE — REASON FOR VISIT
[Initial Evaluation] : an initial evaluation [Pre-Visit Preparation] : pre-visit preparation was done [Intercurrent Specialty/Sub-specialty Visits] : the patient has intercurrent specialty/sub-specialty visits [FreeTextEntry2] : [facility] Clinical Viewer [FreeTextEntry3] : podiatry, GI colonoscopy, Derm

## 2024-10-02 NOTE — PHYSICAL EXAM
[No Acute Distress] : no acute distress [Normal Voice/Communication] : normal voice communication [Normal Sclera/Conjunctiva] : normal sclera/conjunctiva [PERRL] : pupils equal, round and reactive to light [Normal Outer Ear/Nose] : the ears and nose were normal in appearance [Normal Oropharynx] : the oropharynx was normal [No JVD] : no jugular venous distention [Supple] : the neck was supple [No Respiratory Distress] : no respiratory distress [Clear to Auscultation] : lungs were clear to auscultation bilaterally [No Accessory Muscle Use] : no accessory muscle use [Normal Rate] : heart rate was normal  [Regular Rhythm] : with a regular rhythm [Normal S1, S2] : normal S1 and S2 [Pedal Pulses Present] : the pedal pulses are present [No Edema] : there was no peripheral edema [Normal Bowel Sounds] : normal bowel sounds [Non Tender] : non-tender [Soft] : abdomen soft [Not Distended] : not distended [No CVA Tenderness] : no ~M costovertebral angle tenderness [No Spinal Tenderness] : no spinal tenderness [Normal Gait] : normal gait [No Joint Swelling] : no joint swelling seen [No Clubbing, Cyanosis] : no clubbing  or cyanosis of the fingernails [Normal Strength/Tone] : muscle strength and tone were normal [No Rash] : no rash [Cranial Nerves Intact] : cranial nerves 2-12 were intact [No Motor Deficits] : the motor exam was normal [Oriented x3] : oriented to person, place, and time [Normal Affect] : the affect was normal [Normal Mood] : the mood was normal [Normal Insight/Judgement] : insight and judgment were intact [Kyphosis] : no kyphosis present [de-identified] : no thrush [de-identified] : sacrum/heels intact [de-identified] : CN 2-12 grossly intacrt

## 2024-10-02 NOTE — HEALTH RISK ASSESSMENT
[HRA Reviewed] : Health risk assessment reviewed [Independent] : managing finances [Some assistance needed] : using transportation [No falls in past year] : Patient reported no falls in the past year [No] : The patient does not have visual impairment [FreeTextEntry2] : does not drive friend/HHA takes to supermarket etc [FreeTextEntry6] : does not drive, friend/HHA takes to appts [de-identified] : reading glasses

## 2024-10-17 ENCOUNTER — APPOINTMENT (OUTPATIENT)
Age: 74
End: 2024-10-17
Payer: MEDICARE

## 2024-10-17 VITALS — HEIGHT: 61 IN | BODY MASS INDEX: 24.17 KG/M2 | WEIGHT: 128 LBS

## 2024-10-17 DIAGNOSIS — B35.1 TINEA UNGUIUM: ICD-10-CM

## 2024-10-17 DIAGNOSIS — I73.89 OTHER SPECIFIED PERIPHERAL VASCULAR DISEASES: ICD-10-CM

## 2024-10-17 DIAGNOSIS — B35.3 TINEA PEDIS: ICD-10-CM

## 2024-10-17 DIAGNOSIS — M79.671 PAIN IN RIGHT FOOT: ICD-10-CM

## 2024-10-17 DIAGNOSIS — M79.672 PAIN IN LEFT FOOT: ICD-10-CM

## 2024-10-17 PROCEDURE — 99203 OFFICE O/P NEW LOW 30 MIN: CPT | Mod: 25

## 2024-10-17 PROCEDURE — 11721 DEBRIDE NAIL 6 OR MORE: CPT | Mod: Q8

## 2024-10-17 RX ORDER — KETOCONAZOLE 20 MG/G
2 CREAM TOPICAL TWICE DAILY
Qty: 60 | Refills: 3 | Status: ACTIVE | COMMUNITY
Start: 2024-10-17 | End: 1900-01-01
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
## 2024-11-25 ENCOUNTER — APPOINTMENT (OUTPATIENT)
Dept: HOME HEALTH SERVICES | Facility: HOME HEALTH | Age: 74
End: 2024-11-25

## 2024-12-26 ENCOUNTER — APPOINTMENT (OUTPATIENT)
Age: 74
End: 2024-12-26
Payer: MEDICARE

## 2024-12-26 VITALS — BODY MASS INDEX: 24.17 KG/M2 | HEIGHT: 61 IN | WEIGHT: 128 LBS

## 2024-12-26 DIAGNOSIS — B35.1 TINEA UNGUIUM: ICD-10-CM

## 2024-12-26 DIAGNOSIS — I73.89 OTHER SPECIFIED PERIPHERAL VASCULAR DISEASES: ICD-10-CM

## 2024-12-26 DIAGNOSIS — M79.672 PAIN IN LEFT FOOT: ICD-10-CM

## 2024-12-26 DIAGNOSIS — B35.3 TINEA PEDIS: ICD-10-CM

## 2024-12-26 DIAGNOSIS — M79.671 PAIN IN RIGHT FOOT: ICD-10-CM

## 2024-12-26 PROCEDURE — 99213 OFFICE O/P EST LOW 20 MIN: CPT | Mod: 25

## 2024-12-26 PROCEDURE — 11721 DEBRIDE NAIL 6 OR MORE: CPT | Mod: Q8

## 2025-01-08 ENCOUNTER — APPOINTMENT (OUTPATIENT)
Dept: HOME HEALTH SERVICES | Facility: HOME HEALTH | Age: 75
End: 2025-01-08
Payer: MEDICARE

## 2025-01-08 VITALS
OXYGEN SATURATION: 96 % | SYSTOLIC BLOOD PRESSURE: 120 MMHG | HEART RATE: 70 BPM | DIASTOLIC BLOOD PRESSURE: 74 MMHG | WEIGHT: 130 LBS | RESPIRATION RATE: 16 BRPM | BODY MASS INDEX: 24.56 KG/M2

## 2025-01-08 DIAGNOSIS — Z71.89 OTHER SPECIFIED COUNSELING: ICD-10-CM

## 2025-01-08 DIAGNOSIS — I73.89 OTHER SPECIFIED PERIPHERAL VASCULAR DISEASES: ICD-10-CM

## 2025-01-08 DIAGNOSIS — E78.5 HYPERLIPIDEMIA, UNSPECIFIED: ICD-10-CM

## 2025-01-08 DIAGNOSIS — I10 ESSENTIAL (PRIMARY) HYPERTENSION: ICD-10-CM

## 2025-01-08 DIAGNOSIS — F20.9 SCHIZOPHRENIA, UNSPECIFIED: ICD-10-CM

## 2025-01-08 PROCEDURE — 99349 HOME/RES VST EST MOD MDM 40: CPT

## 2025-01-08 RX ORDER — PANTOPRAZOLE 40 MG/1
40 TABLET, DELAYED RELEASE ORAL DAILY
Qty: 1 | Refills: 3 | Status: ACTIVE | COMMUNITY
Start: 2025-01-08

## 2025-03-26 ENCOUNTER — APPOINTMENT (OUTPATIENT)
Age: 75
End: 2025-03-26
Payer: MEDICARE

## 2025-03-26 DIAGNOSIS — B35.1 TINEA UNGUIUM: ICD-10-CM

## 2025-03-26 DIAGNOSIS — M79.671 PAIN IN RIGHT FOOT: ICD-10-CM

## 2025-03-26 DIAGNOSIS — M79.672 PAIN IN LEFT FOOT: ICD-10-CM

## 2025-03-26 DIAGNOSIS — B35.3 TINEA PEDIS: ICD-10-CM

## 2025-03-26 DIAGNOSIS — I73.89 OTHER SPECIFIED PERIPHERAL VASCULAR DISEASES: ICD-10-CM

## 2025-03-26 PROCEDURE — 11721 DEBRIDE NAIL 6 OR MORE: CPT | Mod: Q8

## 2025-03-26 PROCEDURE — 99213 OFFICE O/P EST LOW 20 MIN: CPT | Mod: 25

## 2025-03-26 RX ORDER — CLOTRIMAZOLE 10 MG/G
1 CREAM TOPICAL
Qty: 1 | Refills: 4 | Status: ACTIVE | COMMUNITY
Start: 2025-03-26 | End: 1900-01-01

## 2025-04-28 ENCOUNTER — APPOINTMENT (OUTPATIENT)
Dept: HOME HEALTH SERVICES | Facility: HOME HEALTH | Age: 75
End: 2025-04-28

## 2025-06-04 ENCOUNTER — APPOINTMENT (OUTPATIENT)
Age: 75
End: 2025-06-04

## 2025-06-12 NOTE — ED PROVIDER NOTE - DURATION
3/day(s) The time documented includes the review of chart, labs, tests and other pertinent documents, interview, collaterals, decision-making, psychoeducation, coordination of care etc. and excludes time spent on separately reportable services/teaching during the encounter.